# Patient Record
Sex: MALE | Race: WHITE | NOT HISPANIC OR LATINO | Employment: FULL TIME | URBAN - METROPOLITAN AREA
[De-identification: names, ages, dates, MRNs, and addresses within clinical notes are randomized per-mention and may not be internally consistent; named-entity substitution may affect disease eponyms.]

---

## 2017-01-31 ENCOUNTER — HOSPITAL ENCOUNTER (OUTPATIENT)
Dept: RADIOLOGY | Facility: MEDICAL CENTER | Age: 52
End: 2017-01-31
Attending: FAMILY MEDICINE
Payer: COMMERCIAL

## 2017-01-31 DIAGNOSIS — M54.2 CERVICALGIA: ICD-10-CM

## 2017-01-31 PROCEDURE — 72141 MRI NECK SPINE W/O DYE: CPT

## 2017-02-16 ENCOUNTER — TELEPHONE (OUTPATIENT)
Dept: SLEEP MEDICINE | Facility: MEDICAL CENTER | Age: 52
End: 2017-02-16

## 2017-02-16 DIAGNOSIS — G47.33 OSA (OBSTRUCTIVE SLEEP APNEA): ICD-10-CM

## 2017-02-16 NOTE — TELEPHONE ENCOUNTER
Patient walked into the sleep center today - he is very dissatisfied with current DME supplier ([facility]) and would like to switchout to   DME:  [facility] /  [phone] / fax [phone].  PLEASE SIGN ORDER FOR Supplies.    Last f/v 8/16/16  Next 2/21/17

## 2017-02-21 ENCOUNTER — APPOINTMENT (OUTPATIENT)
Dept: SLEEP MEDICINE | Facility: MEDICAL CENTER | Age: 52
End: 2017-02-21
Payer: COMMERCIAL

## 2017-04-23 ENCOUNTER — OFFICE VISIT (OUTPATIENT)
Dept: URGENT CARE | Facility: PHYSICIAN GROUP | Age: 52
End: 2017-04-23
Payer: COMMERCIAL

## 2017-04-23 VITALS
SYSTOLIC BLOOD PRESSURE: 134 MMHG | DIASTOLIC BLOOD PRESSURE: 92 MMHG | BODY MASS INDEX: 40.81 KG/M2 | WEIGHT: 260 LBS | HEIGHT: 67 IN | TEMPERATURE: 98.7 F | RESPIRATION RATE: 18 BRPM | OXYGEN SATURATION: 96 % | HEART RATE: 84 BPM

## 2017-04-23 DIAGNOSIS — L30.9 ECZEMA, UNSPECIFIED TYPE: ICD-10-CM

## 2017-04-23 DIAGNOSIS — B37.89 CANDIDA RASH OF GROIN: ICD-10-CM

## 2017-04-23 PROCEDURE — 99214 OFFICE O/P EST MOD 30 MIN: CPT | Performed by: PHYSICIAN ASSISTANT

## 2017-04-23 RX ORDER — FLUCONAZOLE 150 MG/1
TABLET ORAL
Qty: 3 TAB | Refills: 0 | Status: SHIPPED | OUTPATIENT
Start: 2017-04-23 | End: 2018-10-21

## 2017-04-23 RX ORDER — CLOBETASOL PROPIONATE 0.5 MG/G
CREAM TOPICAL
Qty: 60 G | Refills: 0 | Status: SHIPPED | OUTPATIENT
Start: 2017-04-23 | End: 2017-06-20

## 2017-04-23 NOTE — MR AVS SNAPSHOT
"French Hurd   2017 2:00 PM   Office Visit   MRN: [id number]    Department:  [facility]   Dept Phone:  [phone]    Description:  Male : 1965   Provider:  Lashae Juárez PA-C           Reason for Visit     Rash Groin      Allergies as of 2017     No Known Allergies      You were diagnosed with     Candida rash of groin   [[id number]]         Vital Signs     Blood Pressure Pulse Temperature Respirations Height Weight    134/92 mmHg 84 37.1 °C (98.7 °F) 18 1.702 m (5' 7\") 117.935 kg (260 lb)    Body Mass Index Oxygen Saturation Smoking Status             40.71 kg/m2 96% Former Smoker         Basic Information     Date Of Birth Sex Race Ethnicity Preferred Language    1965 Male White Non- English      Problem List              ICD-10-CM Priority Class Noted - Resolved    Hypertension I10   Unknown - Present    Obesity E66.9   Unknown - Present    Osteoarthritis M19.90   Unknown - Present    Obstructive sleep apnea G47.33   Unknown - Present    Hypercholesteremia E78.00   Unknown - Present    Cervical spine degeneration M47.812   Unknown - Present    Insomnia G47.00   3/16/2010 - Present    Eczema L30.9   2/3/2011 - Present    ED (erectile dysfunction) N52.9   2/3/2011 - Present    Environmental allergies Z91.09   2/3/2011 - Present    GERD (gastroesophageal reflux disease) K21.9   6/15/2011 - Present    Vitamin d deficiency    1/10/2012 - Present    Leg pain, left M79.605   10/10/2013 - Present    Elevated blood sugar R73.9   2014 - Present    Asthma in adult J45.909   2014 - Present    Knee pain M25.569   2014 - Present    Bronchitis with bronchospasm J20.9   2015 - Present    BMI 45.0-49.9, adult (CMS-HCC) Z68.42   2015 - Present      Health Maintenance        Date Due Completion Dates    IMM PNEUMOCOCCAL 19-64 (ADULT) MEDIUM RISK SERIES (1 of 1 - PPSV23) 1986 ---    IMM DTaP/Tdap/Td Vaccine (2 - Td) 2023            Current " Immunizations     Tdap Vaccine 7/7/2013      Below and/or attached are the medications your provider expects you to take. Review all of your home medications and newly ordered medications with your provider and/or pharmacist. Follow medication instructions as directed by your provider and/or pharmacist. Please keep your medication list with you and share with your provider. Update the information when medications are discontinued, doses are changed, or new medications (including over-the-counter products) are added; and carry medication information at all times in the event of emergency situations     Allergies:  No Known Allergies          Medications  Valid as of: April 23, 2017 -  2:46 PM    Generic Name Brand Name Tablet Size Instructions for use    Albuterol Sulfate (Aero Soln) albuterol 108 (90 BASE) MCG/ACT Inhale 2 Puffs by mouth every 6 hours as needed for Shortness of Breath.        Albuterol Sulfate (Aero Soln) albuterol 108 (90 BASE) MCG/ACT Inhale 2 Puffs by mouth every four hours as needed for Shortness of Breath.        Amoxicillin-Pot Clavulanate (Tab) AUGMENTIN 875-125 MG Take 1 Tab by mouth 2 times a day.        Azithromycin (Tab) ZITHROMAX 250 MG Take as directed        Cholecalciferol (Tab) cholecalciferol 1000 UNIT Take 1,000 Units by mouth every day.        Clobetasol Propionate (Cream) TEMOVATE 0.05 % Apply to affected area up BID for up to 10 days.        Clotrimazole (Cream) LOTRIMIN 1 % Apply twice daily to both feet        Esomeprazole Magnesium (CAPSULE DELAYED RELEASE) NEXIUM 40 MG TAKE 1 CAPSULE DAILY        Fenofibrate (Tab) TRICOR 145 MG TAKE 1 TABLET DAILY        Fluconazole (Tab) DIFLUCAN 150 MG Take 1 tablet every other day for 6 days.        Fluticasone Propionate (Suspension) FLONASE 50 MCG/ACT Spray 2 Sprays in nose every day.        Fluticasone Propionate HFA (Aerosol) FLOVENT HFA 44 MCG/ACT Inhale 2 Puffs by mouth 2 times a day.        Guaifenesin-Codeine (Solution) ROBITUSSIN  -10 mg/5mL Take 5-10 mL by mouth every 6 hours as needed for Cough.        Hydrocodone-Acetaminophen (Tab) NORCO  MG Take 1-2 Tabs by mouth every 6 hours as needed.        Ibuprofen (Tab) MOTRIN 600 MG Take 1 Tab by mouth every 6 hours as needed for Mild Pain.        Levocetirizine Dihydrochloride (Tab) Levocetirizine Dihydrochloride 5 MG TAKE 1 TABLET DAILY        Lisinopril (Tab) PRINIVIL 10 MG Take 1 Tab by mouth every day.        Mometasone Furo-Formoterol Fum (Aerosol) Mometasone Furo-Formoterol Fum 200-5 MCG/ACT Inhale 1 Puff by mouth 2 Times a Day.        Mometasone Furo-Formoterol Fum (Aerosol) Mometasone Furo-Formoterol Fum 100-5 MCG/ACT Inhale  by mouth.        Mupirocin (Ointment) BACTROBAN 2 % Apply 1 Application to affected area(s) every day.        Naltrexone-Bupropion HCl (TABLET SR 12 HR) Naltrexone-Bupropion HCl ER 8-90 MG Take 1 Tab by mouth 2 Times a Day.        Nystatin-Triamcinolone (Cream) MYCOLOG 205357-8.1 UNIT/GM-% Apply to affected BID x 10-14 days        Ondansetron (TABLET DISPERSIBLE) ZOFRAN ODT 4 MG Take 1 Tab by mouth every 8 hours as needed for Nausea/Vomiting.        Oxycodone-Acetaminophen (Tab) PERCOCET-10  MG Take 1-2 Tabs by mouth 2 Times a Day.        Triamcinolone Acetonide (Cream) KENALOG 0.1 % Apply in a thin layer twice per day to dermatitis        Zolpidem Tartrate (Tab) AMBIEN 10 MG Take 1 Tab by mouth at bedtime as needed for Sleep.        .                 Medicines prescribed today were sent to:     [facility] - [place], [place] - [address] [place] [address]    [address] [facility] [address]    Phone: [phone] Fax: [phone]    Open 24 Hours?: No    [facility] - [place], [place] - [address] [facility] [address] [place]    [address] [address]    Phone: [phone] Fax: [phone]    Open 24 Hours?: No    [facility] - [place] - [address]    [address]  [facility] [address]    Phone: [phone] Fax: [phone]    Open 24 Hours?: No    [facility]    [address] [facility] [address] [facility] [address] [place] [address]    Phone: [phone] Fax: [phone]      Medication refill instructions:       If your prescription bottle indicates you have medication refills left, it is not necessary to call your provider’s office. Please contact your pharmacy and they will refill your medication.    If your prescription bottle indicates you do not have any refills left, you may request refills at any time through one of the following ways: The online [employer] system (except Urgent Care), by calling your provider’s office, or by asking your pharmacy to contact your provider’s office with a refill request. Medication refills are processed only during regular business hours and may not be available until the next business day. Your provider may request additional information or to have a follow-up visit with you prior to refilling your medication.   *Please Note: Medication refills are assigned a new Rx number when refilled electronically. Your pharmacy may indicate that no refills were authorized even though a new prescription for the same medication is available at the pharmacy. Please request the medicine by name with the pharmacy before contacting your provider for a refill.           [employer] Access Code: Activation code not generated  Current [employer] Status: Active

## 2017-04-30 NOTE — PROGRESS NOTES
Chief Complaint   Patient presents with   • Rash     Groin       HISTORY OF PRESENT ILLNESS: Patient is a 51 y.o. male who presents today for rash in groin, worsening last several days.  Patient has had itchy rash in this area for several days however in last day or so it has caused swelling in the genital region and increased itching.  Patient also notes he has recurrence of his known eczema on his chest, but has had this before and in his groin this feels completley dissimilar.    Denies hx of rash in this area.  Patient is not diabetic.  He has not attempted any interventions.   Feels well otherwise.   Denies fevers, chills, abdominal pain or body aches or urinary symptoms.     He is also requesting refill on his Clobetasol for his eczema as this has worked well for him in the past.     Patient Active Problem List    Diagnosis Date Noted   • BMI 45.0-49.9, adult (CMS-[facility]) 09/22/2015   • Bronchitis with bronchospasm 08/25/2015   • Knee pain 12/23/2014   • Asthma in adult 04/24/2014   • Elevated blood sugar 01/23/2014   • Leg pain, left 10/10/2013   • Vitamin d deficiency 01/10/2012   • GERD (gastroesophageal reflux disease) 06/15/2011   • Eczema 02/03/2011   • ED (erectile dysfunction) 02/03/2011   • Environmental allergies 02/03/2011   • Insomnia 03/16/2010   • Hypertension    • Obesity    • Osteoarthritis    • Obstructive sleep apnea    • Hypercholesteremia    • Cervical spine degeneration        Allergies:Review of patient's allergies indicates no known allergies.    Current Outpatient Prescriptions Ordered in [facility]   Medication Sig Dispense Refill   • nystatin/triamcinolone (MYCOLOG) 307040-0.1 UNIT/GM-% Cream Apply to affected BID x 10-14 days 60 g 0   • fluconazole (DIFLUCAN) 150 MG tablet Take 1 tablet every other day for 6 days. 3 Tab 0   • clobetasol (TEMOVATE) 0.05 % Cream Apply to affected area up BID for up to 10 days. 60 g 0   • clotrimazole (LOTRIMIN) 1 % Cream Apply twice daily to both feet 1 Tube 2    • ondansetron (ZOFRAN ODT) 4 MG TABLET DISPERSIBLE Take 1 Tab by mouth every 8 hours as needed for Nausea/Vomiting. 20 Tab 0   • fluticasone (FLOVENT HFA) 44 MCG/ACT Aerosol Inhale 2 Puffs by mouth 2 times a day. 3 Inhaler 3   • Levocetirizine Dihydrochloride 5 MG TABS TAKE 1 TABLET DAILY 90 Tab 3   • fluticasone (FLONASE) 50 MCG/ACT nasal spray Spray 2 Sprays in nose every day. 3 Bottle 3   • fenofibrate (TRICOR) 145 MG TABS TAKE 1 TABLET DAILY 90 Tab 2   • lisinopril (PRINIVIL) 10 MG TABS Take 1 Tab by mouth every day. (Patient taking differently: Take 20 mg by mouth every day.) 90 Tab 3   • ibuprofen (MOTRIN) 600 MG TABS Take 1 Tab by mouth every 6 hours as needed for Mild Pain. 30 Each 0   • amoxicillin-clavulanate (AUGMENTIN) 875-125 MG Tab Take 1 Tab by mouth 2 times a day. 20 Tab 0   • Mometasone Furo-Formoterol Fum 100-5 MCG/ACT Aerosol Inhale  by mouth.     • azithromycin (ZITHROMAX) 250 MG Tab Take as directed 6 Tab 0   • albuterol (VENTOLIN OR PROVENTIL) 108 (90 BASE) MCG/ACT Aero Soln inhalation aerosol Inhale 2 Puffs by mouth every four hours as needed for Shortness of Breath. 1 Inhaler 1   • guaifenesin-codeine (CHERATUSSIN AC) Solution Take 5-10 mL by mouth every 6 hours as needed for Cough. 90 mL 0   • Naltrexone-Bupropion HCl ER 8-90 MG TABLET SR 12 HR Take 1 Tab by mouth 2 Times a Day. 180 Tab 0   • hydrocodone/acetaminophen (NORCO)  MG Tab Take 1-2 Tabs by mouth every 6 hours as needed. 60 Tab 0   • albuterol (VENTOLIN OR PROVENTIL) 108 (90 BASE) MCG/ACT Aero Soln inhalation aerosol Inhale 2 Puffs by mouth every 6 hours as needed for Shortness of Breath. 3 Inhaler 3   • mupirocin (BACTROBAN) 2 % OINT Apply 1 Application to affected area(s) every day. 1 Tube 0   • zolpidem (AMBIEN) 10 MG TABS Take 1 Tab by mouth at bedtime as needed for Sleep. 90 Each 3   • Oxycodone-Acetaminophen (PERCOCET-10)  MG TABS Take 1-2 Tabs by mouth 2 Times a Day. 60 Tab 0   • NEXIUM 40 MG capsule TAKE 1  CAPSULE DAILY 90 Cap 3   • Mometasone Furo-Formoterol Fum 200-5 MCG/ACT AERO Inhale 1 Puff by mouth 2 Times a Day. 3 Inhaler 3   • vitamin D (CHOLECALCIFEROL) 1000 UNIT TABS Take 1,000 Units by mouth every day.     • triamcinolone acetonide (KENALOG) 0.1 % CREA Apply in a thin layer twice per day to dermatitis 3 Tube 3     No current Epic-ordered facility-administered medications on file.       Past Medical History   Diagnosis Date   • BPH (benign prostatic hypertrophy)    • Obesity    • Sleep apnea      on CPAP   • Hypercholesteremia    • Prediabetes    • Right arm pain    • Breast mass      right side between 11 and 12 o'clock at nipple   • Chest pain    • Allergy    • Osteoarthritis    • Cervical spine degeneration    • ASTHMA    • Hypertension    • ED (erectile dysfunction)    • Back pain    • Sweat, sweating, excessive    • Wears glasses    • Earache    • Ringing in ears    • Difficulty breathing        Social History   Substance Use Topics   • Smoking status: Former Smoker -- 0.50 packs/day for .5 years     Types: Cigarettes     Quit date: 02/11/1982   • Smokeless tobacco: Never Used   • Alcohol Use: Yes      Comment: 3-4       Family Status   Relation Status Death Age   • Mother Other      pt is adopted   • Father Other      pt is adopted   • Daughter Alive    • Daughter Alive    History reviewed. No pertinent family history.    ROS:  Review of Systems   Constitutional: Negative for fever, chills, weight loss and malaise/fatigue.   HENT: Negative for ear pain, nosebleeds, congestion, sore throat and neck pain.    Eyes: Negative for blurred vision.   Respiratory: Negative for cough, sputum production, shortness of breath and wheezing.    Cardiovascular: Negative for chest pain, palpitations, orthopnea and leg swelling.   Gastrointestinal: Negative for heartburn, nausea, vomiting and abdominal pain.   Genitourinary: Negative for dysuria, urgency and frequency.   Skin: SEE HPI  All other systems reviewed and are  "negative.       Exam:  Blood pressure 134/92, pulse 84, temperature 37.1 °C (98.7 °F), resp. rate 18, height 1.702 m (5' 7\"), weight 117.935 kg (260 lb), SpO2 96 %.  General:  Well nourished, well developed male in NAD  Eyes: PERRLA, EOM within normal limits, no conjunctival injection, no scleral icterus, visual fields and acuity grossly intact.  Mouth: reasonable hygiene, no erythema exudates or tonsillar enlargement.  Neck: no masses, range of motion within normal limits, no tracheal deviation. No lymphadenopathy  Pulmonary: Normal respiratory effort, no wheezes, crackles, or rhonchi.  Cardiovascular: regular rate and rhythm without murmurs, rubs, or gallops.  Abdomen: Soft, nontender, nondistended. Normal bowel sounds. No hepatosplenomegaly or masses, or hernias. No rebound or guarding.  Skin: Warm, pink, dry.  Groin, penis with moderate macular erythema.  There is no vesicles, pustules or drainage.  No warmth.   No penile discharge.   Anterior mid right chest there is maculopapular rash, area 3 cm x 2 cm.   No vesicles or pustules.   Extremities: no clubbing, cyanosis, or edema.  Neuro: A&O x 3. Speech normal/clear.  Normal gait.         Assessment/Plan:  1. Candida rash of groin  nystatin/triamcinolone (MYCOLOG) 604616-5.1 UNIT/GM-% Cream    fluconazole (DIFLUCAN) 150 MG tablet    clobetasol (TEMOVATE) 0.05 % Cream   2. Eczema, unspecified type         -rash of groin most consistent with concern for candida.   Will give oral and topical treatment with steroid component for itching.   -given refill of Clobetasol however recommend against that he use in groin at all and to avoid all mucus membranes/genitalia.   -recommend 2-3 day follow up for recheck, PCP follow up to this.   Consider Derm follow up if needed.     Supportive care, differential diagnoses, and indications for immediate follow-up discussed with patient.   Pathogenesis of diagnosis discussed including typical length and natural progression. "   Instructed to return to clinic or nearest emergency department for any change in condition, further concerns, or worsening of symptoms.  Patient states understanding of the plan of care and discharge instructions.  Instructed to make an appointment, for follow up, with their primary care provider.      Lashae Juárez PA-C

## 2017-06-14 ENCOUNTER — HOSPITAL ENCOUNTER (OUTPATIENT)
Dept: LAB | Facility: MEDICAL CENTER | Age: 52
End: 2017-06-14
Attending: FAMILY MEDICINE
Payer: COMMERCIAL

## 2017-06-14 LAB
ALBUMIN SERPL BCP-MCNC: 3.8 G/DL (ref 3.2–4.9)
ALBUMIN/GLOB SERPL: 1.3 G/DL
ALP SERPL-CCNC: 77 U/L (ref 30–99)
ALT SERPL-CCNC: 30 U/L (ref 2–50)
ANION GAP SERPL CALC-SCNC: 7 MMOL/L (ref 0–11.9)
AST SERPL-CCNC: 23 U/L (ref 12–45)
BASOPHILS # BLD AUTO: 0.8 % (ref 0–1.8)
BASOPHILS # BLD: 0.06 K/UL (ref 0–0.12)
BILIRUB SERPL-MCNC: 0.5 MG/DL (ref 0.1–1.5)
BUN SERPL-MCNC: 18 MG/DL (ref 8–22)
CALCIUM SERPL-MCNC: 9.1 MG/DL (ref 8.5–10.5)
CHLORIDE SERPL-SCNC: 105 MMOL/L (ref 96–112)
CHOLEST SERPL-MCNC: 231 MG/DL (ref 100–199)
CO2 SERPL-SCNC: 26 MMOL/L (ref 20–33)
CREAT SERPL-MCNC: 0.82 MG/DL (ref 0.5–1.4)
CREAT UR-MCNC: 177.1 MG/DL
EOSINOPHIL # BLD AUTO: 0.34 K/UL (ref 0–0.51)
EOSINOPHIL NFR BLD: 4.6 % (ref 0–6.9)
ERYTHROCYTE [DISTWIDTH] IN BLOOD BY AUTOMATED COUNT: 42.5 FL (ref 35.9–50)
EST. AVERAGE GLUCOSE BLD GHB EST-MCNC: 120 MG/DL
GFR SERPL CREATININE-BSD FRML MDRD: >60 ML/MIN/1.73 M 2
GLOBULIN SER CALC-MCNC: 3 G/DL (ref 1.9–3.5)
GLUCOSE SERPL-MCNC: 104 MG/DL (ref 65–99)
HBA1C MFR BLD: 5.8 % (ref 0–5.6)
HCT VFR BLD AUTO: 45 % (ref 42–52)
HDLC SERPL-MCNC: 32 MG/DL
HGB BLD-MCNC: 15.4 G/DL (ref 14–18)
IMM GRANULOCYTES # BLD AUTO: 0.03 K/UL (ref 0–0.11)
IMM GRANULOCYTES NFR BLD AUTO: 0.4 % (ref 0–0.9)
LDLC SERPL CALC-MCNC: ABNORMAL MG/DL
LYMPHOCYTES # BLD AUTO: 2.47 K/UL (ref 1–4.8)
LYMPHOCYTES NFR BLD: 33.1 % (ref 22–41)
MCH RBC QN AUTO: 30.4 PG (ref 27–33)
MCHC RBC AUTO-ENTMCNC: 34.2 G/DL (ref 33.7–35.3)
MCV RBC AUTO: 88.9 FL (ref 81.4–97.8)
MICROALBUMIN UR-MCNC: <0.7 MG/DL
MICROALBUMIN/CREAT UR: NORMAL MG/G (ref 0–30)
MONOCYTES # BLD AUTO: 0.76 K/UL (ref 0–0.85)
MONOCYTES NFR BLD AUTO: 10.2 % (ref 0–13.4)
NEUTROPHILS # BLD AUTO: 3.81 K/UL (ref 1.82–7.42)
NEUTROPHILS NFR BLD: 50.9 % (ref 44–72)
NRBC # BLD AUTO: 0 K/UL
NRBC BLD AUTO-RTO: 0 /100 WBC
PLATELET # BLD AUTO: 251 K/UL (ref 164–446)
PMV BLD AUTO: 11.4 FL (ref 9–12.9)
POTASSIUM SERPL-SCNC: 4.4 MMOL/L (ref 3.6–5.5)
PROT SERPL-MCNC: 6.8 G/DL (ref 6–8.2)
RBC # BLD AUTO: 5.06 M/UL (ref 4.7–6.1)
SODIUM SERPL-SCNC: 138 MMOL/L (ref 135–145)
TRIGL SERPL-MCNC: 474 MG/DL (ref 0–149)
TSH SERPL DL<=0.005 MIU/L-ACNC: 3.93 UIU/ML (ref 0.3–3.7)
WBC # BLD AUTO: 7.5 K/UL (ref 4.8–10.8)

## 2017-06-14 PROCEDURE — 85025 COMPLETE CBC W/AUTO DIFF WBC: CPT

## 2017-06-14 PROCEDURE — 84443 ASSAY THYROID STIM HORMONE: CPT

## 2017-06-14 PROCEDURE — 36415 COLL VENOUS BLD VENIPUNCTURE: CPT

## 2017-06-14 PROCEDURE — 80061 LIPID PANEL: CPT

## 2017-06-14 PROCEDURE — 82043 UR ALBUMIN QUANTITATIVE: CPT

## 2017-06-14 PROCEDURE — 80053 COMPREHEN METABOLIC PANEL: CPT

## 2017-06-14 PROCEDURE — 83036 HEMOGLOBIN GLYCOSYLATED A1C: CPT

## 2017-06-14 PROCEDURE — 82570 ASSAY OF URINE CREATININE: CPT

## 2018-02-18 ENCOUNTER — OFFICE VISIT (OUTPATIENT)
Dept: URGENT CARE | Facility: PHYSICIAN GROUP | Age: 53
End: 2018-02-18
Payer: COMMERCIAL

## 2018-02-18 VITALS
BODY MASS INDEX: 46.93 KG/M2 | OXYGEN SATURATION: 97 % | HEART RATE: 104 BPM | SYSTOLIC BLOOD PRESSURE: 150 MMHG | WEIGHT: 299 LBS | HEIGHT: 67 IN | RESPIRATION RATE: 24 BRPM | TEMPERATURE: 98.7 F | DIASTOLIC BLOOD PRESSURE: 78 MMHG

## 2018-02-18 DIAGNOSIS — R06.02 SOB (SHORTNESS OF BREATH): ICD-10-CM

## 2018-02-18 DIAGNOSIS — B35.9 TINEA: ICD-10-CM

## 2018-02-18 DIAGNOSIS — R06.2 WHEEZING: ICD-10-CM

## 2018-02-18 DIAGNOSIS — J45.909 UNCOMPLICATED ASTHMA, UNSPECIFIED ASTHMA SEVERITY, UNSPECIFIED WHETHER PERSISTENT: ICD-10-CM

## 2018-02-18 PROCEDURE — 99214 OFFICE O/P EST MOD 30 MIN: CPT | Performed by: PHYSICIAN ASSISTANT

## 2018-02-18 RX ORDER — CLOTRIMAZOLE 1 %
CREAM (GRAM) TOPICAL
Qty: 1 TUBE | Refills: 0 | Status: SHIPPED | OUTPATIENT
Start: 2018-02-18 | End: 2018-10-21

## 2018-02-18 RX ORDER — ALBUTEROL SULFATE 2.5 MG/3ML
2.5 SOLUTION RESPIRATORY (INHALATION) EVERY 4 HOURS PRN
Qty: 60 BULLET | Refills: 2 | Status: SHIPPED | OUTPATIENT
Start: 2018-02-18 | End: 2019-09-17

## 2018-02-18 RX ORDER — PREDNISONE 10 MG/1
TABLET ORAL
Qty: 30 TAB | Refills: 0 | Status: SHIPPED | OUTPATIENT
Start: 2018-02-18 | End: 2018-10-21

## 2018-02-18 RX ORDER — DOXYCYCLINE HYCLATE 100 MG
100 TABLET ORAL 2 TIMES DAILY
Qty: 20 TAB | Refills: 0 | Status: SHIPPED | OUTPATIENT
Start: 2018-02-18 | End: 2018-10-21

## 2018-02-18 NOTE — PROGRESS NOTES
"Chief Complaint   Patient presents with   • Cough     Productive; x2 days   • Rash     Back       HISTORY OF PRESENT ILLNESS: Patient is a 52 y.o. male who presents today for the following:      Cough x 2 days  + dry, colder than normal, \"humming\" in left ear  H/o asthma  No nebulizer  Denies  Fever, nasal congestion, ST    Rash -   Left groin, red, well demarcated borders  Back - itching x 2 weeks       Patient Active Problem List    Diagnosis Date Noted   • BMI 45.0-49.9, adult (CMS-[facility]) 09/22/2015   • Bronchitis with bronchospasm 08/25/2015   • Knee pain 12/23/2014   • Asthma in adult 04/24/2014   • Elevated blood sugar 01/23/2014   • Leg pain, left 10/10/2013   • Vitamin d deficiency 01/10/2012   • GERD (gastroesophageal reflux disease) 06/15/2011   • Eczema 02/03/2011   • ED (erectile dysfunction) 02/03/2011   • Environmental allergies 02/03/2011   • Insomnia 03/16/2010   • Hypertension    • Obesity    • Osteoarthritis    • Obstructive sleep apnea    • Hypercholesteremia    • Cervical spine degeneration        Allergies:Patient has no known allergies.    Current Outpatient Prescriptions Ordered in [facility]   Medication Sig Dispense Refill   • predniSONE (DELTASONE) 10 MG Tab 50mg x 1 day; 40mg x 1 day; 30mg x 1 day; 20mg x 1 day; 10mg x 1 day 30 Tab 0   • doxycycline (VIBRAMYCIN) 100 MG Tab Take 1 Tab by mouth 2 times a day. Fill if symptoms worsen at any point OR if they fail to improve over the next 7-10 days 20 Tab 0   • clotrimazole (LOTRIMIN) 1 % Cream Apply to affected area 2-3 times per day 1 Tube 0   • albuterol (PROVENTIL) 2.5mg/3ml Nebu Soln solution for nebulization 3 mL by Nebulization route every four hours as needed for Shortness of Breath. 60 Bullet 2   • nystatin/triamcinolone (MYCOLOG) 358988-0.1 UNIT/GM-% Cream Apply to affected BID x 10-14 days 60 g 0   • fluconazole (DIFLUCAN) 150 MG tablet Take 1 tablet every other day for 6 days. 3 Tab 0   • amoxicillin-clavulanate (AUGMENTIN) 875-125 MG Tab " Take 1 Tab by mouth 2 times a day. 20 Tab 0   • clotrimazole (LOTRIMIN) 1 % Cream Apply twice daily to both feet 1 Tube 2   • ondansetron (ZOFRAN ODT) 4 MG TABLET DISPERSIBLE Take 1 Tab by mouth every 8 hours as needed for Nausea/Vomiting. 20 Tab 0   • Mometasone Furo-Formoterol Fum 100-5 MCG/ACT Aerosol Inhale  by mouth.     • azithromycin (ZITHROMAX) 250 MG Tab Take as directed 6 Tab 0   • albuterol (VENTOLIN OR PROVENTIL) 108 (90 BASE) MCG/ACT Aero Soln inhalation aerosol Inhale 2 Puffs by mouth every four hours as needed for Shortness of Breath. 1 Inhaler 1   • guaifenesin-codeine (CHERATUSSIN AC) Solution Take 5-10 mL by mouth every 6 hours as needed for Cough. 90 mL 0   • Naltrexone-Bupropion HCl ER 8-90 MG TABLET SR 12 HR Take 1 Tab by mouth 2 Times a Day. 180 Tab 0   • hydrocodone/acetaminophen (NORCO)  MG Tab Take 1-2 Tabs by mouth every 6 hours as needed. 60 Tab 0   • fluticasone (FLOVENT HFA) 44 MCG/ACT Aerosol Inhale 2 Puffs by mouth 2 times a day. 3 Inhaler 3   • albuterol (VENTOLIN OR PROVENTIL) 108 (90 BASE) MCG/ACT Aero Soln inhalation aerosol Inhale 2 Puffs by mouth every 6 hours as needed for Shortness of Breath. 3 Inhaler 3   • mupirocin (BACTROBAN) 2 % OINT Apply 1 Application to affected area(s) every day. 1 Tube 0   • Levocetirizine Dihydrochloride 5 MG TABS TAKE 1 TABLET DAILY 90 Tab 3   • zolpidem (AMBIEN) 10 MG TABS Take 1 Tab by mouth at bedtime as needed for Sleep. 90 Each 3   • Oxycodone-Acetaminophen (PERCOCET-10)  MG TABS Take 1-2 Tabs by mouth 2 Times a Day. 60 Tab 0   • fluticasone (FLONASE) 50 MCG/ACT nasal spray Spray 2 Sprays in nose every day. 3 Bottle 3   • fenofibrate (TRICOR) 145 MG TABS TAKE 1 TABLET DAILY 90 Tab 2   • NEXIUM 40 MG capsule TAKE 1 CAPSULE DAILY 90 Cap 3   • Mometasone Furo-Formoterol Fum 200-5 MCG/ACT AERO Inhale 1 Puff by mouth 2 Times a Day. 3 Inhaler 3   • lisinopril (PRINIVIL) 10 MG TABS Take 1 Tab by mouth every day. (Patient taking  differently: Take 20 mg by mouth every day.) 90 Tab 3   • vitamin D (CHOLECALCIFEROL) 1000 UNIT TABS Take 1,000 Units by mouth every day.     • triamcinolone acetonide (KENALOG) 0.1 % CREA Apply in a thin layer twice per day to dermatitis 3 Tube 3   • ibuprofen (MOTRIN) 600 MG TABS Take 1 Tab by mouth every 6 hours as needed for Mild Pain. 30 Each 0     No current [facility]-ordered facility-administered medications on file.        Past Medical History:   Diagnosis Date   • Allergy    • ASTHMA    • Back pain    • BPH (benign prostatic hypertrophy)    • Breast mass     right side between 11 and 12 o'clock at nipple   • Cervical spine degeneration    • Chest pain    • Difficulty breathing    • Earache    • ED (erectile dysfunction)    • Hypercholesteremia    • Hypertension    • Obesity    • Osteoarthritis    • Prediabetes    • Right arm pain    • Ringing in ears    • Sleep apnea     on CPAP   • Sweat, sweating, excessive    • Wears glasses        Social History   Substance Use Topics   • Smoking status: Former Smoker     Packs/day: 0.50     Years: 0.50     Types: Cigarettes     Quit date: 2/11/1982   • Smokeless tobacco: Never Used   • Alcohol use Yes      Comment: 3-4       Family Status   Relation Status   • Mother Other    pt is adopted   • Father Other    pt is adopted   • Daughter Alive   • Daughter Alive   History reviewed. No pertinent family history.    ROS:   Review of Systems   Constitutional: Negative for fever, chills, weight loss and malaise/fatigue.   HENT: Negative for ear pain, nosebleeds, congestion, sore throat and neck pain.    Eyes: Negative for blurred vision.   Respiratory: Positive for cough, shortness of breath and wheezing.    Cardiovascular: Negative for chest pain, palpitations, orthopnea and leg swelling.   Gastrointestinal: Negative for heartburn, nausea, vomiting and abdominal pain.   Genitourinary: Negative for dysuria, urgency and frequency.       Exam:  Blood pressure 150/78, pulse (!) 104,  "temperature 37.1 °C (98.7 °F), resp. rate (!) 24, height 1.702 m (5' 7\"), weight (!) 135.6 kg (299 lb), SpO2 97 %.  General: Well developed, well nourished. No distress.  HEENT: Conjunctiva clear, lids without ptosis, PERRL/EOMI. Ears normal shape and contour, canals are clear bilaterally, tympanic membranes are benign. Nasal mucosa benign. Oropharynx is without erythema, edema or exudates. Is grossly normal.  Pulmonary: Diffuse mild inspiratory and inspiratory wheezes.  Cardiovascular: Regular rate and rhythm without murmur. No edema.   Neurologic: Grossly nonfocal.  Lymph: No cervical lymphadenopathy noted.  Skin: Deferred.  Psych: Normal mood. Alert and oriented x3. Judgment and insight is normal.    Assessment/Plan:  Discussed likely viral etiology. Discussed appropriate over-the-counter symptomatic medication, and when to return to clinic. Start steroids now. Antibiotics for worsening respiratory symptoms. Use all medication as directed. Follow up for worsening or persistent symptoms. Patient will be sent home with a nebulizer machine.   1. Uncomplicated asthma, unspecified asthma severity, unspecified whether persistent  doxycycline (VIBRAMYCIN) 100 MG Tab   2. SOB (shortness of breath)  predniSONE (DELTASONE) 10 MG Tab    albuterol (PROVENTIL) 2.5mg/3ml Nebu Soln solution for nebulization   3. Wheezing  predniSONE (DELTASONE) 10 MG Tab    albuterol (PROVENTIL) 2.5mg/3ml Nebu Soln solution for nebulization   4. Tinea  predniSONE (DELTASONE) 10 MG Tab    clotrimazole (LOTRIMIN) 1 % Cream         "

## 2018-04-09 ENCOUNTER — OFFICE VISIT (OUTPATIENT)
Dept: URGENT CARE | Facility: PHYSICIAN GROUP | Age: 53
End: 2018-04-09
Payer: COMMERCIAL

## 2018-04-09 VITALS
HEART RATE: 83 BPM | HEIGHT: 67 IN | OXYGEN SATURATION: 93 % | TEMPERATURE: 98.1 F | SYSTOLIC BLOOD PRESSURE: 140 MMHG | BODY MASS INDEX: 48.03 KG/M2 | RESPIRATION RATE: 16 BRPM | DIASTOLIC BLOOD PRESSURE: 82 MMHG | WEIGHT: 306 LBS

## 2018-04-09 DIAGNOSIS — J01.40 ACUTE PANSINUSITIS, RECURRENCE NOT SPECIFIED: ICD-10-CM

## 2018-04-09 PROCEDURE — 99214 OFFICE O/P EST MOD 30 MIN: CPT | Performed by: NURSE PRACTITIONER

## 2018-04-09 RX ORDER — AMOXICILLIN AND CLAVULANATE POTASSIUM 875; 125 MG/1; MG/1
1 TABLET, FILM COATED ORAL 2 TIMES DAILY
Qty: 14 TAB | Refills: 0 | Status: SHIPPED | OUTPATIENT
Start: 2018-04-09 | End: 2018-04-16

## 2018-04-09 ASSESSMENT — ENCOUNTER SYMPTOMS
ORTHOPNEA: 0
HEADACHES: 0
BACK PAIN: 0
CHILLS: 0
CONSTIPATION: 0
PALPITATIONS: 0
SPUTUM PRODUCTION: 1
WHEEZING: 0
SINUS PAIN: 1
FEVER: 0
DIARRHEA: 0
EYE REDNESS: 0
NAUSEA: 0
ABDOMINAL PAIN: 0
SORE THROAT: 1
VOMITING: 0
WEAKNESS: 0
SHORTNESS OF BREATH: 0
DIZZINESS: 0
COUGH: 1
EYE DISCHARGE: 0
MYALGIAS: 0

## 2018-04-09 ASSESSMENT — PAIN SCALES - GENERAL: PAINLEVEL: NO PAIN

## 2018-04-09 NOTE — PROGRESS NOTES
Subjective:      French Hurd is a 52 y.o. male who presents with Hoarse (x 1 day); Pharyngitis; and Nasal Congestion            HPI  French is here for loss of voice x 1 day, but has had nasal congestion, green nasal d/c, PND, sore throat, cough x 1 week. Taking no OTC meds, works for [employer] and just got  Back in town. H/o asthma and used inhaler yesterday but has not had problems with asthma. Has flonase but not using.    PMH:  has a past medical history of Allergy; ASTHMA; Back pain; BPH (benign prostatic hypertrophy); Breast mass; Cervical spine degeneration; Chest pain; Difficulty breathing; Earache; ED (erectile dysfunction); Hypercholesteremia; Hypertension; Obesity; Osteoarthritis; Prediabetes; Right arm pain; Ringing in ears; Sleep apnea; Sweat, sweating, excessive; and Wears glasses.  MEDS:   Current Outpatient Prescriptions:   •  amoxicillin-clavulanate (AUGMENTIN) 875-125 MG Tab, Take 1 Tab by mouth 2 times a day for 7 days., Disp: 14 Tab, Rfl: 0  •  clotrimazole (LOTRIMIN) 1 % Cream, Apply twice daily to both feet, Disp: 1 Tube, Rfl: 2  •  Levocetirizine Dihydrochloride 5 MG TABS, TAKE 1 TABLET DAILY, Disp: 90 Tab, Rfl: 3  •  fluticasone (FLONASE) 50 MCG/ACT nasal spray, Spray 2 Sprays in nose every day., Disp: 3 Bottle, Rfl: 3  •  fenofibrate (TRICOR) 145 MG TABS, TAKE 1 TABLET DAILY, Disp: 90 Tab, Rfl: 2  •  NEXIUM 40 MG capsule, TAKE 1 CAPSULE DAILY, Disp: 90 Cap, Rfl: 3  •  lisinopril (PRINIVIL) 10 MG TABS, Take 1 Tab by mouth every day. (Patient taking differently: Take 20 mg by mouth every day.), Disp: 90 Tab, Rfl: 3  •  predniSONE (DELTASONE) 10 MG Tab, 50mg x 1 day; 40mg x 1 day; 30mg x 1 day; 20mg x 1 day; 10mg x 1 day, Disp: 30 Tab, Rfl: 0  •  doxycycline (VIBRAMYCIN) 100 MG Tab, Take 1 Tab by mouth 2 times a day. Fill if symptoms worsen at any point OR if they fail to improve over the next 7-10 days, Disp: 20 Tab, Rfl: 0  •  clotrimazole (LOTRIMIN) 1 % Cream, Apply to affected area 2-3 times  per day, Disp: 1 Tube, Rfl: 0  •  albuterol (PROVENTIL) 2.5mg/3ml Nebu Soln solution for nebulization, 3 mL by Nebulization route every four hours as needed for Shortness of Breath., Disp: 60 Bullet, Rfl: 2  •  nystatin/triamcinolone (MYCOLOG) 987091-1.1 UNIT/GM-% Cream, Apply to affected BID x 10-14 days, Disp: 60 g, Rfl: 0  •  fluconazole (DIFLUCAN) 150 MG tablet, Take 1 tablet every other day for 6 days., Disp: 3 Tab, Rfl: 0  •  amoxicillin-clavulanate (AUGMENTIN) 875-125 MG Tab, Take 1 Tab by mouth 2 times a day., Disp: 20 Tab, Rfl: 0  •  ondansetron (ZOFRAN ODT) 4 MG TABLET DISPERSIBLE, Take 1 Tab by mouth every 8 hours as needed for Nausea/Vomiting., Disp: 20 Tab, Rfl: 0  •  Mometasone Furo-Formoterol Fum 100-5 MCG/ACT Aerosol, Inhale  by mouth., Disp: , Rfl:   •  azithromycin (ZITHROMAX) 250 MG Tab, Take as directed, Disp: 6 Tab, Rfl: 0  •  albuterol (VENTOLIN OR PROVENTIL) 108 (90 BASE) MCG/ACT Aero Soln inhalation aerosol, Inhale 2 Puffs by mouth every four hours as needed for Shortness of Breath., Disp: 1 Inhaler, Rfl: 1  •  guaifenesin-codeine (CHERATUSSIN AC) Solution, Take 5-10 mL by mouth every 6 hours as needed for Cough., Disp: 90 mL, Rfl: 0  •  Naltrexone-Bupropion HCl ER 8-90 MG TABLET SR 12 HR, Take 1 Tab by mouth 2 Times a Day., Disp: 180 Tab, Rfl: 0  •  hydrocodone/acetaminophen (NORCO)  MG Tab, Take 1-2 Tabs by mouth every 6 hours as needed., Disp: 60 Tab, Rfl: 0  •  fluticasone (FLOVENT HFA) 44 MCG/ACT Aerosol, Inhale 2 Puffs by mouth 2 times a day., Disp: 3 Inhaler, Rfl: 3  •  albuterol (VENTOLIN OR PROVENTIL) 108 (90 BASE) MCG/ACT Aero Soln inhalation aerosol, Inhale 2 Puffs by mouth every 6 hours as needed for Shortness of Breath., Disp: 3 Inhaler, Rfl: 3  •  mupirocin (BACTROBAN) 2 % OINT, Apply 1 Application to affected area(s) every day., Disp: 1 Tube, Rfl: 0  •  zolpidem (AMBIEN) 10 MG TABS, Take 1 Tab by mouth at bedtime as needed for Sleep., Disp: 90 Each, Rfl: 3  •   Oxycodone-Acetaminophen (PERCOCET-10)  MG TABS, Take 1-2 Tabs by mouth 2 Times a Day., Disp: 60 Tab, Rfl: 0  •  Mometasone Furo-Formoterol Fum 200-5 MCG/ACT AERO, Inhale 1 Puff by mouth 2 Times a Day., Disp: 3 Inhaler, Rfl: 3  •  vitamin D (CHOLECALCIFEROL) 1000 UNIT TABS, Take 1,000 Units by mouth every day., Disp: , Rfl:   •  triamcinolone acetonide (KENALOG) 0.1 % CREA, Apply in a thin layer twice per day to dermatitis, Disp: 3 Tube, Rfl: 3  •  ibuprofen (MOTRIN) 600 MG TABS, Take 1 Tab by mouth every 6 hours as needed for Mild Pain., Disp: 30 Each, Rfl: 0  ALLERGIES: No Known Allergies  SURGHX:   Past Surgical History:   Procedure Laterality Date   • APPENDECTOMY     • HAND ORIF     • OTHER ORTHOPEDIC SURGERY      L wrist, R hand-4th and 5th metacarpal   • TONSILLECTOMY     • VASECTOMY      x2     SOCHX:  reports that he quit smoking about 36 years ago. His smoking use included Cigarettes. He has a 0.25 pack-year smoking history. He has never used smokeless tobacco. He reports that he drinks alcohol. He reports that he does not use drugs.  FH: Family history was reviewed, no pertinent findings to report    Review of Systems   Constitutional: Positive for malaise/fatigue. Negative for chills and fever.   HENT: Positive for congestion, sinus pain and sore throat. Negative for ear pain.    Eyes: Negative for discharge and redness.   Respiratory: Positive for cough and sputum production. Negative for shortness of breath and wheezing.    Cardiovascular: Negative for chest pain, palpitations and orthopnea.   Gastrointestinal: Negative for abdominal pain, constipation, diarrhea, nausea and vomiting.   Musculoskeletal: Negative for back pain and myalgias.   Neurological: Negative for dizziness, weakness and headaches.   Endo/Heme/Allergies: Negative for environmental allergies.   All other systems reviewed and are negative.         Objective:     /82   Pulse 83   Temp 36.7 °C (98.1 °F)   Resp 16   Ht  "1.702 m (5' 7\")   Wt (!) 138.8 kg (306 lb)   SpO2 93%   BMI 47.93 kg/m²      Physical Exam   Constitutional: He is oriented to person, place, and time. Vital signs are normal. He appears well-developed and well-nourished. He is active and cooperative.  Non-toxic appearance. He does not have a sickly appearance. He appears ill. No distress.   HENT:   Head: Normocephalic.   Right Ear: External ear and ear canal normal. Tympanic membrane is injected.   Left Ear: External ear and ear canal normal. Tympanic membrane is injected.   Nose: Mucosal edema, rhinorrhea and sinus tenderness present. Right sinus exhibits maxillary sinus tenderness and frontal sinus tenderness. Left sinus exhibits maxillary sinus tenderness.   Mouth/Throat: Uvula is midline. Mucous membranes are dry. No uvula swelling. Posterior oropharyngeal edema and posterior oropharyngeal erythema present.   Eyes: Conjunctivae and EOM are normal. Pupils are equal, round, and reactive to light.   Neck: Normal range of motion. Neck supple.   Cardiovascular: Normal rate and regular rhythm.    Pulmonary/Chest: Effort normal and breath sounds normal. No accessory muscle usage. No respiratory distress. He has no decreased breath sounds. He has no wheezes. He has no rhonchi. He has no rales.   Musculoskeletal: Normal range of motion.   Lymphadenopathy:     He has no cervical adenopathy.   Neurological: He is alert and oriented to person, place, and time.   Skin: Skin is warm and dry. He is not diaphoretic.   Vitals reviewed.              Assessment/Plan:     1. Acute pansinusitis, recurrence not specified    - amoxicillin-clavulanate (AUGMENTIN) 875-125 MG Tab; Take 1 Tab by mouth 2 times a day for 7 days.  Dispense: 14 Tab; Refill: 0    Increase water intake  Get rest  May use Ibuprofen/Tylenol prn for any fever, body aches or throat pain  May take longer acting antihistamine for seasonal allergy symptoms prn  May use saline nasal spray for nasal congestion  May " use Flonase or Nasocort for allergen nasal congestion  May gargle with salt water prn for throat discomfort  May drink smoothies for nutrition if too painful to swallow solid foods  Monitor for productive cough, SOB and chest pain/tightness- need re-evaluation

## 2018-06-06 ENCOUNTER — APPOINTMENT (RX ONLY)
Dept: URBAN - NONMETROPOLITAN AREA CLINIC 15 | Facility: CLINIC | Age: 53
Setting detail: DERMATOLOGY
End: 2018-06-06

## 2018-06-06 DIAGNOSIS — B35.6 TINEA CRURIS: ICD-10-CM

## 2018-06-06 DIAGNOSIS — L82.1 OTHER SEBORRHEIC KERATOSIS: ICD-10-CM

## 2018-06-06 DIAGNOSIS — L91.8 OTHER HYPERTROPHIC DISORDERS OF THE SKIN: ICD-10-CM

## 2018-06-06 DIAGNOSIS — L24.9 IRRITANT CONTACT DERMATITIS, UNSPECIFIED CAUSE: ICD-10-CM

## 2018-06-06 DIAGNOSIS — D485 NEOPLASM OF UNCERTAIN BEHAVIOR OF SKIN: ICD-10-CM

## 2018-06-06 DIAGNOSIS — L30.4 ERYTHEMA INTERTRIGO: ICD-10-CM

## 2018-06-06 PROBLEM — D48.5 NEOPLASM OF UNCERTAIN BEHAVIOR OF SKIN: Status: ACTIVE | Noted: 2018-06-06

## 2018-06-06 PROBLEM — I10 ESSENTIAL (PRIMARY) HYPERTENSION: Status: ACTIVE | Noted: 2018-06-06

## 2018-06-06 PROBLEM — J45.909 UNSPECIFIED ASTHMA, UNCOMPLICATED: Status: ACTIVE | Noted: 2018-06-06

## 2018-06-06 PROCEDURE — ? COUNSELING

## 2018-06-06 PROCEDURE — ? PRESCRIPTION

## 2018-06-06 PROCEDURE — ? PATIENT SPECIFIC COUNSELING

## 2018-06-06 PROCEDURE — 99202 OFFICE O/P NEW SF 15 MIN: CPT

## 2018-06-06 RX ORDER — KETOCONAZOLE 20 MG/G
CREAM TOPICAL
Qty: 1 | Refills: 12 | Status: ERX | COMMUNITY
Start: 2018-06-06

## 2018-06-06 RX ADMIN — KETOCONAZOLE: 20 CREAM TOPICAL at 00:00

## 2018-06-06 ASSESSMENT — LOCATION ZONE DERM
LOCATION ZONE: LEG
LOCATION ZONE: HAND
LOCATION ZONE: AXILLAE
LOCATION ZONE: FEET
LOCATION ZONE: FINGER

## 2018-06-06 ASSESSMENT — LOCATION SIMPLE DESCRIPTION DERM
LOCATION SIMPLE: LEFT FOOT
LOCATION SIMPLE: RIGHT AXILLARY VAULT
LOCATION SIMPLE: RIGHT HAND
LOCATION SIMPLE: LEFT AXILLARY VAULT
LOCATION SIMPLE: LEFT THIGH
LOCATION SIMPLE: LEFT HAND
LOCATION SIMPLE: LEFT INDEX FINGER
LOCATION SIMPLE: RIGHT THIGH

## 2018-06-06 ASSESSMENT — LOCATION DETAILED DESCRIPTION DERM
LOCATION DETAILED: LEFT RADIAL DORSAL HAND
LOCATION DETAILED: RIGHT ULNAR DORSAL HAND
LOCATION DETAILED: LEFT DORSAL FOOT
LOCATION DETAILED: LEFT AXILLARY VAULT
LOCATION DETAILED: RIGHT ANTERIOR PROXIMAL THIGH
LOCATION DETAILED: LEFT INDEX PROXIMAL INTERPHALANGEAL JOINT
LOCATION DETAILED: RIGHT AXILLARY VAULT
LOCATION DETAILED: LEFT ANTERIOR PROXIMAL THIGH

## 2018-06-06 NOTE — PROCEDURE: COUNSELING
Detail Level: Zone
Patient Specific Counseling (Will Not Stick From Patient To Patient): Discussed removal of lesions.  Pt. will contact us when he is ready for removal.
Detail Level: Detailed
Patient Specific Counseling (Will Not Stick From Patient To Patient): Discussed that this lesion could be a wart or another type of neoplasm that appears on fingers.  Discussed OTC treatment and if lesion does not respond a bx. is needed.

## 2018-06-06 NOTE — PROCEDURE: PATIENT SPECIFIC COUNSELING
Discussed that I think that the irritation is due to the way that he walks and how his shoes wear on the top of his foot.  Discussed avoidance and OTC treatment.  Pt. is to let me know if it does not get better.
Detail Level: Detailed

## 2018-06-29 ENCOUNTER — OFFICE VISIT (OUTPATIENT)
Dept: URGENT CARE | Facility: PHYSICIAN GROUP | Age: 53
End: 2018-06-29
Payer: COMMERCIAL

## 2018-06-29 ENCOUNTER — APPOINTMENT (OUTPATIENT)
Dept: RADIOLOGY | Facility: IMAGING CENTER | Age: 53
End: 2018-06-29
Attending: PHYSICIAN ASSISTANT
Payer: COMMERCIAL

## 2018-06-29 VITALS
DIASTOLIC BLOOD PRESSURE: 88 MMHG | RESPIRATION RATE: 18 BRPM | HEIGHT: 67 IN | HEART RATE: 96 BPM | SYSTOLIC BLOOD PRESSURE: 136 MMHG | WEIGHT: 314 LBS | OXYGEN SATURATION: 93 % | BODY MASS INDEX: 49.28 KG/M2 | TEMPERATURE: 98.6 F

## 2018-06-29 DIAGNOSIS — M25.561 ACUTE PAIN OF RIGHT KNEE: ICD-10-CM

## 2018-06-29 DIAGNOSIS — M77.8 LEFT ELBOW TENDONITIS: ICD-10-CM

## 2018-06-29 DIAGNOSIS — M25.561 ACUTE PAIN OF RIGHT KNEE: Primary | ICD-10-CM

## 2018-06-29 PROCEDURE — 99214 OFFICE O/P EST MOD 30 MIN: CPT | Performed by: PHYSICIAN ASSISTANT

## 2018-06-29 PROCEDURE — 73564 X-RAY EXAM KNEE 4 OR MORE: CPT | Mod: TC,FY,RT | Performed by: PHYSICIAN ASSISTANT

## 2018-06-29 RX ORDER — LISINOPRIL 20 MG/1
20 TABLET ORAL DAILY
COMMUNITY
End: 2018-10-21

## 2018-06-29 RX ORDER — PREDNISONE 20 MG/1
20 TABLET ORAL DAILY
Qty: 10 TAB | Refills: 0 | Status: SHIPPED | OUTPATIENT
Start: 2018-06-29 | End: 2018-07-09

## 2018-06-29 ASSESSMENT — ENCOUNTER SYMPTOMS
MYALGIAS: 0
ARTHRALGIAS: 1
FOCAL WEAKNESS: 0
TINGLING: 0
SENSORY CHANGE: 0

## 2018-06-29 NOTE — PROGRESS NOTES
Subjective:      French Hurd is a 52 y.o. male who presents with Arm Pain (L arm pain x1wk non injury related) and Knee Pain (R knee pain radiating down to the ankle x1wk non injury related)    PMH:  has a past medical history of Allergy; ASTHMA; Back pain; BPH (benign prostatic hypertrophy); Breast mass; Cervical spine degeneration; Chest pain; Difficulty breathing; Earache; ED (erectile dysfunction); Hypercholesteremia; Hypertension; Obesity; Osteoarthritis; Prediabetes; Right arm pain; Ringing in ears; Sleep apnea; Sweat, sweating, excessive; and Wears glasses.  MEDS:   Current Outpatient Prescriptions:   •  lisinopril (PRINIVIL) 20 MG Tab, Take 20 mg by mouth every day., Disp: , Rfl:   •  predniSONE (DELTASONE) 10 MG Tab, 50mg x 1 day; 40mg x 1 day; 30mg x 1 day; 20mg x 1 day; 10mg x 1 day, Disp: 30 Tab, Rfl: 0  •  doxycycline (VIBRAMYCIN) 100 MG Tab, Take 1 Tab by mouth 2 times a day. Fill if symptoms worsen at any point OR if they fail to improve over the next 7-10 days, Disp: 20 Tab, Rfl: 0  •  clotrimazole (LOTRIMIN) 1 % Cream, Apply to affected area 2-3 times per day, Disp: 1 Tube, Rfl: 0  •  albuterol (PROVENTIL) 2.5mg/3ml Nebu Soln solution for nebulization, 3 mL by Nebulization route every four hours as needed for Shortness of Breath., Disp: 60 Bullet, Rfl: 2  •  nystatin/triamcinolone (MYCOLOG) 379290-9.1 UNIT/GM-% Cream, Apply to affected BID x 10-14 days, Disp: 60 g, Rfl: 0  •  fluconazole (DIFLUCAN) 150 MG tablet, Take 1 tablet every other day for 6 days., Disp: 3 Tab, Rfl: 0  •  amoxicillin-clavulanate (AUGMENTIN) 875-125 MG Tab, Take 1 Tab by mouth 2 times a day., Disp: 20 Tab, Rfl: 0  •  clotrimazole (LOTRIMIN) 1 % Cream, Apply twice daily to both feet, Disp: 1 Tube, Rfl: 2  •  ondansetron (ZOFRAN ODT) 4 MG TABLET DISPERSIBLE, Take 1 Tab by mouth every 8 hours as needed for Nausea/Vomiting., Disp: 20 Tab, Rfl: 0  •  Mometasone Furo-Formoterol Fum 100-5 MCG/ACT Aerosol, Inhale  by mouth., Disp: ,  Rfl:   •  azithromycin (ZITHROMAX) 250 MG Tab, Take as directed, Disp: 6 Tab, Rfl: 0  •  albuterol (VENTOLIN OR PROVENTIL) 108 (90 BASE) MCG/ACT Aero Soln inhalation aerosol, Inhale 2 Puffs by mouth every four hours as needed for Shortness of Breath., Disp: 1 Inhaler, Rfl: 1  •  guaifenesin-codeine (CHERATUSSIN AC) Solution, Take 5-10 mL by mouth every 6 hours as needed for Cough., Disp: 90 mL, Rfl: 0  •  Naltrexone-Bupropion HCl ER 8-90 MG TABLET SR 12 HR, Take 1 Tab by mouth 2 Times a Day., Disp: 180 Tab, Rfl: 0  •  hydrocodone/acetaminophen (NORCO)  MG Tab, Take 1-2 Tabs by mouth every 6 hours as needed., Disp: 60 Tab, Rfl: 0  •  fluticasone (FLOVENT HFA) 44 MCG/ACT Aerosol, Inhale 2 Puffs by mouth 2 times a day., Disp: 3 Inhaler, Rfl: 3  •  albuterol (VENTOLIN OR PROVENTIL) 108 (90 BASE) MCG/ACT Aero Soln inhalation aerosol, Inhale 2 Puffs by mouth every 6 hours as needed for Shortness of Breath., Disp: 3 Inhaler, Rfl: 3  •  mupirocin (BACTROBAN) 2 % OINT, Apply 1 Application to affected area(s) every day., Disp: 1 Tube, Rfl: 0  •  Levocetirizine Dihydrochloride 5 MG TABS, TAKE 1 TABLET DAILY, Disp: 90 Tab, Rfl: 3  •  zolpidem (AMBIEN) 10 MG TABS, Take 1 Tab by mouth at bedtime as needed for Sleep., Disp: 90 Each, Rfl: 3  •  Oxycodone-Acetaminophen (PERCOCET-10)  MG TABS, Take 1-2 Tabs by mouth 2 Times a Day., Disp: 60 Tab, Rfl: 0  •  fluticasone (FLONASE) 50 MCG/ACT nasal spray, Spray 2 Sprays in nose every day., Disp: 3 Bottle, Rfl: 3  •  fenofibrate (TRICOR) 145 MG TABS, TAKE 1 TABLET DAILY, Disp: 90 Tab, Rfl: 2  •  NEXIUM 40 MG capsule, TAKE 1 CAPSULE DAILY, Disp: 90 Cap, Rfl: 3  •  Mometasone Furo-Formoterol Fum 200-5 MCG/ACT AERO, Inhale 1 Puff by mouth 2 Times a Day., Disp: 3 Inhaler, Rfl: 3  •  lisinopril (PRINIVIL) 10 MG TABS, Take 1 Tab by mouth every day. (Patient taking differently: Take 20 mg by mouth every day.), Disp: 90 Tab, Rfl: 3  •  vitamin D (CHOLECALCIFEROL) 1000 UNIT TABS, Take  "1,000 Units by mouth every day., Disp: , Rfl:   •  triamcinolone acetonide (KENALOG) 0.1 % CREA, Apply in a thin layer twice per day to dermatitis, Disp: 3 Tube, Rfl: 3  •  ibuprofen (MOTRIN) 600 MG TABS, Take 1 Tab by mouth every 6 hours as needed for Mild Pain., Disp: 30 Each, Rfl: 0  ALLERGIES: No Known Allergies  SURGHX:   Past Surgical History:   Procedure Laterality Date   • APPENDECTOMY     • HAND ORIF     • OTHER ORTHOPEDIC SURGERY      L wrist, R hand-4th and 5th metacarpal   • TONSILLECTOMY     • VASECTOMY      x2     SOCHX:  reports that he quit smoking about 36 years ago. His smoking use included Cigarettes. He has a 0.25 pack-year smoking history. He has never used smokeless tobacco. He reports that he drinks alcohol. He reports that he does not use drugs.  FH: Reviewed with patient/family. Not pertinent to this complaint.            Patient presents with:    Arm Pain: L elbow/ forearm pain x1wk non injury related, worse with resisted internal and external rotation . Denies injury or trauma to LUE.     Knee Pain: R knee pain radiating down to the ankle x1wk non injury related. PT states pain has been getting progressively worse \"for a while, but definitely more irritating over the last week.\" denies fall, injury, trauma.            Other   This is a new problem. Episode onset: one week. The problem occurs constantly. The problem has been unchanged. Associated symptoms include arthralgias. Pertinent negatives include no myalgias. The symptoms are aggravated by bending, exertion and twisting. He has tried NSAIDs, rest, position changes and relaxation for the symptoms. The treatment provided mild relief.       Review of Systems   Musculoskeletal: Positive for arthralgias and joint pain (right knee, left elbow/forearm). Negative for myalgias.   Neurological: Negative for tingling, sensory change and focal weakness.   All other systems reviewed and are negative.         Objective:     /88   Pulse 96  " " Temp 37 °C (98.6 °F)   Resp 18   Ht 1.702 m (5' 7\")   Wt (!) 142.4 kg (314 lb)   SpO2 93%   BMI 49.18 kg/m²      Physical Exam   Constitutional: He is oriented to person, place, and time. He appears well-developed and well-nourished. No distress.   HENT:   Head: Normocephalic and atraumatic.   Nose: Nose normal.   Eyes: Conjunctivae and EOM are normal. Pupils are equal, round, and reactive to light.   Neck: Normal range of motion. Neck supple. No JVD present.   Cardiovascular: Normal rate and intact distal pulses.    Pulmonary/Chest: Effort normal.   Abdominal: Soft.   Musculoskeletal:        Right knee: He exhibits normal range of motion, no swelling, no effusion, normal alignment, no LCL laxity, normal patellar mobility, no bony tenderness and no MCL laxity. Tenderness found. Medial joint line, lateral joint line, MCL and LCL tenderness noted. No patellar tendon tenderness noted.        Left forearm: He exhibits tenderness. He exhibits no bony tenderness, no swelling and no edema.        Arms:       Legs:  Knee: No joint laxity noted, neg Lachman's test, no joint swelling or effusion.  Distal neuro/vascular intact.     Left forearm: pain in forearm near elbow with resisted supination/pronation consistent with lateral epicondylitis.  Distal neuro/vascular intact.      Lymphadenopathy:     He has no cervical adenopathy.   Neurological: He is alert and oriented to person, place, and time.   Skin: Skin is warm and dry. Capillary refill takes less than 2 seconds.   Nursing note and vitals reviewed.    Xray images viewed and interpreted by me, confirmed by radiology:        Neg for acute fracture, dislocation, no soft tissue swelling.      Assessment/Plan:     1. Acute pain of right knee  DX-KNEE COMPLETE 4+ RIGHT    predniSONE (DELTASONE) 20 MG Tab    REFERRAL TO ORTHOPEDICS   2. Left elbow tendonitis  predniSONE (DELTASONE) 20 MG Tab    REFERRAL TO ORTHOPEDICS   Motrin/Advil/Ibuprophen 600 mg every 6 hours as " needed for pain or fever.    RICE TREATMENT FOR EXTREMITY INJURIES:  R-rest the extremity as much as possible while pain and swelling persist  I-ice the extremity 15 minutes every 2 hours for the first 24 hours, then 4-5 times daily   C-compress the extremity either with splint or ace wrap as directed  E-elevate the extremity to help with swelling    Referral placed to ortho.     Work note given.    PT should follow up with PCP in 1-2 days for re-evaluation if symptoms have not improved.  Discussed red flags and reasons to return to UC or ED.  Pt and/or family verbalized understanding of diagnosis and follow up instructions and was offered informational handout on diagnosis.  PT discharged.

## 2018-06-29 NOTE — LETTER
June 29, 2018         Patient: French Hurd   YOB: 1965   Date of Visit: 6/29/2018           To Whom it May Concern:    French Hurd was seen in my clinic on 6/29/2018. He can return to work on 6/30/2018.  He will need light duty until evaluated by Orthopedics.      If you have any questions or concerns, please don't hesitate to call.        Sincerely,           Carly Tsai P.A.-C.  Electronically Signed

## 2018-06-29 NOTE — LETTER
June 29, 2018         Patient: French Hurd   YOB: 1965   Date of Visit: 6/29/2018           To Whom it May Concern:    French Hurd was seen in my clinic on 6/29/2018. He is restricted to office work.      Tentative return to full duty 7/12/2018.  However, he will be evaluated by Orthopedics on 7/10/2018 at which time these restrictions may be extended or changed as they determine is appropriate.      If you have any questions or concerns, please don't hesitate to call.        Sincerely,           Carly Tsai P.A.-C.  Electronically Signed

## 2018-09-19 ENCOUNTER — HOSPITAL ENCOUNTER (OUTPATIENT)
Dept: LAB | Facility: MEDICAL CENTER | Age: 53
End: 2018-09-19
Attending: FAMILY MEDICINE
Payer: COMMERCIAL

## 2018-09-19 LAB
ALBUMIN SERPL BCP-MCNC: 4.3 G/DL (ref 3.2–4.9)
ALBUMIN/GLOB SERPL: 1.5 G/DL
ALP SERPL-CCNC: 66 U/L (ref 30–99)
ALT SERPL-CCNC: 46 U/L (ref 2–50)
ANION GAP SERPL CALC-SCNC: 7 MMOL/L (ref 0–11.9)
APPEARANCE UR: CLEAR
AST SERPL-CCNC: 36 U/L (ref 12–45)
BASOPHILS # BLD AUTO: 1 % (ref 0–1.8)
BASOPHILS # BLD: 0.07 K/UL (ref 0–0.12)
BILIRUB SERPL-MCNC: 0.4 MG/DL (ref 0.1–1.5)
BILIRUB UR QL STRIP.AUTO: NEGATIVE
BUN SERPL-MCNC: 16 MG/DL (ref 8–22)
CALCIUM SERPL-MCNC: 9 MG/DL (ref 8.5–10.5)
CHLORIDE SERPL-SCNC: 105 MMOL/L (ref 96–112)
CHOLEST SERPL-MCNC: 197 MG/DL (ref 100–199)
CO2 SERPL-SCNC: 25 MMOL/L (ref 20–33)
COLOR UR: YELLOW
CREAT SERPL-MCNC: 0.74 MG/DL (ref 0.5–1.4)
EOSINOPHIL # BLD AUTO: 0.23 K/UL (ref 0–0.51)
EOSINOPHIL NFR BLD: 3.3 % (ref 0–6.9)
ERYTHROCYTE [DISTWIDTH] IN BLOOD BY AUTOMATED COUNT: 43.5 FL (ref 35.9–50)
FASTING STATUS PATIENT QL REPORTED: NORMAL
GLOBULIN SER CALC-MCNC: 2.8 G/DL (ref 1.9–3.5)
GLUCOSE SERPL-MCNC: 120 MG/DL (ref 65–99)
GLUCOSE UR STRIP.AUTO-MCNC: NEGATIVE MG/DL
HCT VFR BLD AUTO: 49.1 % (ref 42–52)
HDLC SERPL-MCNC: 33 MG/DL
HGB BLD-MCNC: 16 G/DL (ref 14–18)
IMM GRANULOCYTES # BLD AUTO: 0.02 K/UL (ref 0–0.11)
IMM GRANULOCYTES NFR BLD AUTO: 0.3 % (ref 0–0.9)
KETONES UR STRIP.AUTO-MCNC: NEGATIVE MG/DL
LDLC SERPL CALC-MCNC: 116 MG/DL
LEUKOCYTE ESTERASE UR QL STRIP.AUTO: NEGATIVE
LYMPHOCYTES # BLD AUTO: 1.79 K/UL (ref 1–4.8)
LYMPHOCYTES NFR BLD: 25.9 % (ref 22–41)
MCH RBC QN AUTO: 29.7 PG (ref 27–33)
MCHC RBC AUTO-ENTMCNC: 32.6 G/DL (ref 33.7–35.3)
MCV RBC AUTO: 91.1 FL (ref 81.4–97.8)
MICRO URNS: NORMAL
MONOCYTES # BLD AUTO: 0.64 K/UL (ref 0–0.85)
MONOCYTES NFR BLD AUTO: 9.3 % (ref 0–13.4)
NEUTROPHILS # BLD AUTO: 4.16 K/UL (ref 1.82–7.42)
NEUTROPHILS NFR BLD: 60.2 % (ref 44–72)
NITRITE UR QL STRIP.AUTO: NEGATIVE
NRBC # BLD AUTO: 0 K/UL
NRBC BLD-RTO: 0 /100 WBC
PH UR STRIP.AUTO: 5.5 [PH]
PLATELET # BLD AUTO: 234 K/UL (ref 164–446)
PMV BLD AUTO: 11.6 FL (ref 9–12.9)
POTASSIUM SERPL-SCNC: 4.3 MMOL/L (ref 3.6–5.5)
PROT SERPL-MCNC: 7.1 G/DL (ref 6–8.2)
PROT UR QL STRIP: NEGATIVE MG/DL
PSA SERPL-MCNC: 0.62 NG/ML (ref 0–4)
RBC # BLD AUTO: 5.39 M/UL (ref 4.7–6.1)
RBC UR QL AUTO: NEGATIVE
SODIUM SERPL-SCNC: 137 MMOL/L (ref 135–145)
SP GR UR STRIP.AUTO: 1.02
TRIGL SERPL-MCNC: 238 MG/DL (ref 0–149)
URATE SERPL-MCNC: 6.3 MG/DL (ref 2.5–8.3)
UROBILINOGEN UR STRIP.AUTO-MCNC: 1 MG/DL
WBC # BLD AUTO: 6.9 K/UL (ref 4.8–10.8)

## 2018-09-19 PROCEDURE — 84443 ASSAY THYROID STIM HORMONE: CPT

## 2018-09-19 PROCEDURE — 81003 URINALYSIS AUTO W/O SCOPE: CPT

## 2018-09-19 PROCEDURE — 84153 ASSAY OF PSA TOTAL: CPT

## 2018-09-19 PROCEDURE — 80061 LIPID PANEL: CPT

## 2018-09-19 PROCEDURE — 84550 ASSAY OF BLOOD/URIC ACID: CPT

## 2018-09-19 PROCEDURE — 36415 COLL VENOUS BLD VENIPUNCTURE: CPT

## 2018-09-19 PROCEDURE — 85025 COMPLETE CBC W/AUTO DIFF WBC: CPT

## 2018-09-19 PROCEDURE — 83036 HEMOGLOBIN GLYCOSYLATED A1C: CPT

## 2018-09-19 PROCEDURE — 80053 COMPREHEN METABOLIC PANEL: CPT

## 2018-09-20 LAB — TSH SERPL DL<=0.005 MIU/L-ACNC: 1.64 UIU/ML (ref 0.38–5.33)

## 2018-09-22 LAB
EST. AVERAGE GLUCOSE BLD GHB EST-MCNC: 128 MG/DL
HBA1C MFR BLD: 6.1 % (ref 0–5.6)

## 2018-10-21 ENCOUNTER — OFFICE VISIT (OUTPATIENT)
Dept: URGENT CARE | Facility: PHYSICIAN GROUP | Age: 53
End: 2018-10-21
Payer: COMMERCIAL

## 2018-10-21 VITALS
OXYGEN SATURATION: 91 % | BODY MASS INDEX: 46.98 KG/M2 | SYSTOLIC BLOOD PRESSURE: 136 MMHG | RESPIRATION RATE: 18 BRPM | TEMPERATURE: 98.1 F | HEIGHT: 68 IN | DIASTOLIC BLOOD PRESSURE: 82 MMHG | WEIGHT: 310 LBS | HEART RATE: 88 BPM

## 2018-10-21 DIAGNOSIS — J06.9 URI WITH COUGH AND CONGESTION: ICD-10-CM

## 2018-10-21 DIAGNOSIS — R79.81 LOW OXYGEN SATURATION: ICD-10-CM

## 2018-10-21 DIAGNOSIS — R06.02 SOB (SHORTNESS OF BREATH): ICD-10-CM

## 2018-10-21 PROCEDURE — 99214 OFFICE O/P EST MOD 30 MIN: CPT | Performed by: PHYSICIAN ASSISTANT

## 2018-10-21 RX ORDER — PREDNISONE 10 MG/1
TABLET ORAL
Qty: 1 QUANTITY SUFFICIENT | Refills: 0 | Status: SHIPPED | OUTPATIENT
Start: 2018-10-21 | End: 2019-08-07

## 2018-10-21 RX ORDER — AZITHROMYCIN 250 MG/1
TABLET, FILM COATED ORAL
Qty: 6 TAB | Refills: 0 | Status: SHIPPED | OUTPATIENT
Start: 2018-10-21 | End: 2019-08-07

## 2018-10-21 RX ORDER — CODEINE PHOSPHATE AND GUAIFENESIN 10; 100 MG/5ML; MG/5ML
5 SOLUTION ORAL EVERY 12 HOURS PRN
Qty: 100 ML | Refills: 0 | Status: SHIPPED | OUTPATIENT
Start: 2018-10-21 | End: 2018-10-31

## 2018-10-21 NOTE — PROGRESS NOTES
Chief Complaint   Patient presents with   • Cough   • Nasal Congestion       HISTORY OF PRESENT ILLNESS: Patient is a 53 y.o. male who presents today for the following:    Cough x 48 hours  +SOB nasal congestion, postnasal drip, ear pressure,, difficulty sleeping due to cough  OTC meds tried: None  History of asthma  Denies history of tobacco use  No history of diabetes      Patient Active Problem List    Diagnosis Date Noted   • BMI 45.0-49.9, adult ([facility]) 09/22/2015   • Bronchitis with bronchospasm 08/25/2015   • Knee pain 12/23/2014   • Asthma in adult 04/24/2014   • Elevated blood sugar 01/23/2014   • Leg pain, left 10/10/2013   • Vitamin d deficiency 01/10/2012   • GERD (gastroesophageal reflux disease) 06/15/2011   • Eczema 02/03/2011   • ED (erectile dysfunction) 02/03/2011   • Environmental allergies 02/03/2011   • Insomnia 03/16/2010   • Hypertension    • Obesity    • Osteoarthritis    • Obstructive sleep apnea    • Hypercholesteremia    • Cervical spine degeneration        Allergies:Patient has no known allergies.    Current Outpatient Prescriptions Ordered in [facility]   Medication Sig Dispense Refill   • predniSONE (DELTASONE) 10 MG Tab 50 mg x 1 day; 40 mg x 1 day; 30 mg x 1 day; 20 mg x 1 day; 10 mg x 1 day 1 Quantity Sufficient 0   • azithromycin (ZITHROMAX) 250 MG Tab Use as package directs 6 Tab 0   • guaifenesin-codeine (ROBITUSSIN AC) Solution oral solution Take 5 mL by mouth every 12 hours as needed for Cough for up to 10 days. 100 mL 0   • albuterol (PROVENTIL) 2.5mg/3ml Nebu Soln solution for nebulization 3 mL by Nebulization route every four hours as needed for Shortness of Breath. 60 Bullet 2   • fluticasone (FLOVENT HFA) 44 MCG/ACT Aerosol Inhale 2 Puffs by mouth 2 times a day. 3 Inhaler 3   • albuterol (VENTOLIN OR PROVENTIL) 108 (90 BASE) MCG/ACT Aero Soln inhalation aerosol Inhale 2 Puffs by mouth every 6 hours as needed for Shortness of Breath. 3 Inhaler 3   • zolpidem (AMBIEN) 10 MG TABS Take  1 Tab by mouth at bedtime as needed for Sleep. 90 Each 3   • fluticasone (FLONASE) 50 MCG/ACT nasal spray Spray 2 Sprays in nose every day. 3 Bottle 3   • fenofibrate (TRICOR) 145 MG TABS TAKE 1 TABLET DAILY 90 Tab 2   • NEXIUM 40 MG capsule TAKE 1 CAPSULE DAILY 90 Cap 3     No current Epic-ordered facility-administered medications on file.        Past Medical History:   Diagnosis Date   • Allergy    • ASTHMA    • Back pain    • BPH (benign prostatic hypertrophy)    • Breast mass     right side between 11 and 12 o'clock at nipple   • Cervical spine degeneration    • Chest pain    • Difficulty breathing    • Earache    • ED (erectile dysfunction)    • Hypercholesteremia    • Hypertension    • Obesity    • Osteoarthritis    • Prediabetes    • Right arm pain    • Ringing in ears    • Sleep apnea     on CPAP   • Sweat, sweating, excessive    • Wears glasses        Social History   Substance Use Topics   • Smoking status: Former Smoker     Packs/day: 0.50     Years: 0.50     Types: Cigarettes     Quit date: 2/11/1982   • Smokeless tobacco: Never Used   • Alcohol use Yes      Comment: 3-4       Family Status   Relation Status   • Mo Other        pt is adopted   • Fa Other        pt is adopted   • Jan Alive   • Jan Alive   No family history on file.    Review of Systems:   Constitutional ROS: No unexpected change in weight, No weakness, No fatigue  Eye ROS: No recent significant change in vision, No eye pain, redness, discharge  Ear ROS: No drainage, No tinnitus or vertigo, No recent change in hearing  Mouth/Throat ROS: No teeth or gum problems, No bleeding gums, No tongue complaints  Neck ROS: No swollen glands, No significant pain in neck  Cardiovascular ROS: No diaphoresis, No edema, No palpitations  Gastrointestinal ROS: No change in bowel habits, No significant change in appetite, No nausea, vomiting, diarrhea, or constipation  Musculoskeletal/Extremities ROS: No peripheral edema, No pain, redness or swelling on the  "joints  Hematologic/Lymphatic ROS: No chills, No night sweats, No weight loss  Skin/Integumentary ROS: No edema, No evidence of rash, No itching      Exam:  Blood pressure 136/82, pulse 88, temperature 36.7 °C (98.1 °F), temperature source Temporal, resp. rate 18, height 1.727 m (5' 8\"), weight (!) 140.6 kg (310 lb), SpO2 91 %.  General: Well developed, well nourished. No distress.  Eye: PERRL/EOMI; conjunctivae clear, lids normal.  ENMT: Lips without lesions, MMM. Oropharynx is clear. Bilateral TMs are within normal limits.  Pulmonary: Unlabored respiratory effort. Lungs clear to auscultation, no wheezes, no rhonchi.  Slight decreased breath sounds throughout.  Cardiovascular: Regular rate and rhythm without murmur.    Neurologic: Grossly nonfocal. No facial asymmetry noted.  Lymph: No cervical lymphadenopathy noted.  Skin: Warm, dry, good turgor. No rashes in visible areas.   Psych: Normal mood. Alert and oriented x3. Judgment and insight is normal.    Assessment/Plan:  Discussed likely viral etiology.  Start with steroid.  Use antibiotic for any worsening symptoms at any point or symptoms fail to improve over the next 3-5 days.  Use all medication as directed.  Follow-up for worsening or persistent symptoms.  Discussed appropriate over-the-counter symptomatic medication, and when to return to clinic.   1. URI with cough and congestion  azithromycin (ZITHROMAX) 250 MG Tab    guaifenesin-codeine (ROBITUSSIN AC) Solution oral solution   2. SOB (shortness of breath)  predniSONE (DELTASONE) 10 MG Tab   3. Low oxygen saturation  predniSONE (DELTASONE) 10 MG Tab       "

## 2019-01-08 ENCOUNTER — OFFICE VISIT (OUTPATIENT)
Dept: URGENT CARE | Facility: PHYSICIAN GROUP | Age: 54
End: 2019-01-08
Payer: COMMERCIAL

## 2019-01-08 VITALS
DIASTOLIC BLOOD PRESSURE: 92 MMHG | HEART RATE: 84 BPM | SYSTOLIC BLOOD PRESSURE: 138 MMHG | WEIGHT: 312 LBS | OXYGEN SATURATION: 97 % | BODY MASS INDEX: 47.44 KG/M2 | TEMPERATURE: 98.4 F | RESPIRATION RATE: 16 BRPM

## 2019-01-08 DIAGNOSIS — L40.9 PSORIASIS: ICD-10-CM

## 2019-01-08 DIAGNOSIS — L03.119 CELLULITIS OF FOOT: ICD-10-CM

## 2019-01-08 DIAGNOSIS — L30.1 ECZEMA, DYSHIDROTIC: ICD-10-CM

## 2019-01-08 PROCEDURE — 99214 OFFICE O/P EST MOD 30 MIN: CPT | Performed by: PHYSICIAN ASSISTANT

## 2019-01-08 RX ORDER — TRIAMCINOLONE ACETONIDE 5 MG/G
CREAM TOPICAL
Qty: 60 G | Refills: 0 | Status: SHIPPED | OUTPATIENT
Start: 2019-01-08 | End: 2019-09-17

## 2019-01-08 RX ORDER — PREDNISONE 10 MG/1
TABLET ORAL
Qty: 1 QUANTITY SUFFICIENT | Refills: 0 | Status: SHIPPED | OUTPATIENT
Start: 2019-01-08 | End: 2019-08-07

## 2019-01-08 RX ORDER — SULFAMETHOXAZOLE AND TRIMETHOPRIM 800; 160 MG/1; MG/1
1 TABLET ORAL 2 TIMES DAILY
Qty: 20 TAB | Refills: 0 | Status: SHIPPED | OUTPATIENT
Start: 2019-01-08 | End: 2019-08-07

## 2019-01-08 RX ORDER — HYDROCODONE BITARTRATE AND ACETAMINOPHEN 5; 325 MG/1; MG/1
1 TABLET ORAL EVERY 6 HOURS PRN
Qty: 12 TAB | Refills: 0 | Status: SHIPPED | OUTPATIENT
Start: 2019-01-08 | End: 2019-01-11

## 2019-01-09 NOTE — PROGRESS NOTES
Chief Complaint   Patient presents with   • Foot Problem     (L) foot painful and swollen       HISTORY OF PRESENT ILLNESS: Patient is a 53 y.o. male who presents today for the following:    Patient comes in for evaluation of his left foot.  Patient has a history of psoriasis and eczema plaque on his left foot for the last few weeks.  He has been soaking intermittently but nothing consistently.  He has steroid cream at home but has not been using it on the lesion.  He has been using some athlete's foot powder but it does not seem to help.  He was wrapping it with co-band but then noticed a new lesion on the arch of his foot.  He now has intense itching, burning, and severe pain when he puts any weight on his foot.  Patient has been unable to sleep due to the pain.  Over-the-counter medication has not been helping.  He denies drainage from his foot, fever, night sweats, and chills.  He does report edema of the foot.  Patient does not have diabetes.    Patient Active Problem List    Diagnosis Date Noted   • BMI 45.0-49.9, adult ([facility]) 09/22/2015   • Bronchitis with bronchospasm 08/25/2015   • Knee pain 12/23/2014   • Asthma in adult 04/24/2014   • Elevated blood sugar 01/23/2014   • Leg pain, left 10/10/2013   • Vitamin d deficiency 01/10/2012   • GERD (gastroesophageal reflux disease) 06/15/2011   • Eczema 02/03/2011   • ED (erectile dysfunction) 02/03/2011   • Environmental allergies 02/03/2011   • Insomnia 03/16/2010   • Hypertension    • Obesity    • Osteoarthritis    • Obstructive sleep apnea    • Hypercholesteremia    • Cervical spine degeneration        Allergies:Patient has no known allergies.    Current Outpatient Prescriptions Ordered in [facility]   Medication Sig Dispense Refill   • predniSONE (DELTASONE) 10 MG Tab 50 mg x 1 day; 40 mg x 1 day; 30 mg x 1 day; 20 mg x 1 day; 10 mg x 1 day 1 Quantity Sufficient 0   • sulfamethoxazole-trimethoprim (BACTRIM DS) 800-160 MG tablet Take 1 Tab by mouth 2 times a day. 20  Tab 0   • triamcinolone acetonide (KENALOG) 0.5 % Cream Apply to affected area up to 4 times daily. Max use is 14 days. 60 g 0   • HYDROcodone-acetaminophen (NORCO) 5-325 MG Tab per tablet Take 1 Tab by mouth every 6 hours as needed (foot pain) for up to 3 days. 12 Tab 0   • predniSONE (DELTASONE) 10 MG Tab 50 mg x 1 day; 40 mg x 1 day; 30 mg x 1 day; 20 mg x 1 day; 10 mg x 1 day 1 Quantity Sufficient 0   • azithromycin (ZITHROMAX) 250 MG Tab Use as package directs 6 Tab 0   • albuterol (PROVENTIL) 2.5mg/3ml Nebu Soln solution for nebulization 3 mL by Nebulization route every four hours as needed for Shortness of Breath. 60 Bullet 2   • fluticasone (FLOVENT HFA) 44 MCG/ACT Aerosol Inhale 2 Puffs by mouth 2 times a day. 3 Inhaler 3   • albuterol (VENTOLIN OR PROVENTIL) 108 (90 BASE) MCG/ACT Aero Soln inhalation aerosol Inhale 2 Puffs by mouth every 6 hours as needed for Shortness of Breath. 3 Inhaler 3   • zolpidem (AMBIEN) 10 MG TABS Take 1 Tab by mouth at bedtime as needed for Sleep. 90 Each 3   • fluticasone (FLONASE) 50 MCG/ACT nasal spray Spray 2 Sprays in nose every day. 3 Bottle 3   • fenofibrate (TRICOR) 145 MG TABS TAKE 1 TABLET DAILY 90 Tab 2   • NEXIUM 40 MG capsule TAKE 1 CAPSULE DAILY 90 Cap 3     No current Epic-ordered facility-administered medications on file.        Past Medical History:   Diagnosis Date   • Allergy    • ASTHMA    • Back pain    • BPH (benign prostatic hypertrophy)    • Breast mass     right side between 11 and 12 o'clock at nipple   • Cervical spine degeneration    • Chest pain    • Difficulty breathing    • Earache    • ED (erectile dysfunction)    • Hypercholesteremia    • Hypertension    • Obesity    • Osteoarthritis    • Prediabetes    • Right arm pain    • Ringing in ears    • Sleep apnea     on CPAP   • Sweat, sweating, excessive    • Wears glasses        Social History   Substance Use Topics   • Smoking status: Former Smoker     Packs/day: 0.50     Years: 0.50     Types:  Cigarettes     Quit date: 2/11/1982   • Smokeless tobacco: Never Used   • Alcohol use Yes      Comment: 3-4       Family Status   Relation Status   • Mo Other        pt is adopted   • Fa Other        pt is adopted   • Jan Alive   • Jan Alive   No family history on file.    Review of Systems:    Constitutional ROS: No unexpected change in weight, No weakness, No fatigue  Eye ROS: No recent significant change in vision, No eye pain, redness, discharge  Ear ROS: No drainage, No tinnitus or vertigo, No recent change in hearing  Mouth/Throat ROS: No teeth or gum problems, No bleeding gums, No tongue complaints  Neck ROS: No swollen glands, No significant pain in neck  Pulmonary ROS: No chronic cough, sputum, or hemoptysis, No dyspnea on exertion, No wheezing  Cardiovascular ROS: No diaphoresis, No edema, No palpitations  Gastrointestinal ROS: No change in bowel habits, No significant change in appetite, No nausea, vomiting, diarrhea, or constipation    Exam:  Blood pressure 138/92, pulse 84, temperature 36.9 °C (98.4 °F), resp. rate 16, weight (!) 141.5 kg (312 lb), SpO2 97 %.  General: Well developed, well nourished. No distress.  Pulmonary: Unlabored respiratory effort.   Cardiovascular: Brisk capillary refill of the left foot.  Skin: Thick plaques noted, one on the dorsum of the left foot, covering most of the dorsum, and one on the anterior aspect of left ankle.  Erythema surrounds the lesion on the dorsum of the foot with cracking, soft tissue swelling, and associated tenderness with palpation.  Cluster of intradermal vesicular lesions is noted on the medial aspect of the left foot in the arch.  Psych: Normal mood. Alert and oriented x3. Judgment and insight is normal.    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  Use all medication as directed.  Discussed the importance of using the pain medication only for sleeping and must have at least 10 hours between taking  medication and starting his shift at work.  Follow-up for any worsening or persistent symptoms.    Prescription Monitoring Program report was reviewed. No evidence of medication abuse or misuse. Discussed the Controlled Substance Use Informed Consent which includes risks and benefits, proper use, storage and disposal, refills, minors, opioids and narcotics, and alternatives. I have assessed the patient’s risk for abuse, dependency, and addiction using the validated Opioid Risk Tool available at https://www.[facility].com/btwnkq-pntb-dncw-ort-narcotic-abuse. All questions answered.   1. Psoriasis  predniSONE (DELTASONE) 10 MG Tab    triamcinolone acetonide (KENALOG) 0.5 % Cream    HYDROcodone-acetaminophen (NORCO) 5-325 MG Tab per tablet   2. Eczema, dyshidrotic  predniSONE (DELTASONE) 10 MG Tab    triamcinolone acetonide (KENALOG) 0.5 % Cream    HYDROcodone-acetaminophen (NORCO) 5-325 MG Tab per tablet   3. Cellulitis of foot  sulfamethoxazole-trimethoprim (BACTRIM DS) 800-160 MG tablet    HYDROcodone-acetaminophen (NORCO) 5-325 MG Tab per tablet    Consent for Opiate Prescription

## 2019-02-09 ENCOUNTER — HOSPITAL ENCOUNTER (OUTPATIENT)
Dept: LAB | Facility: MEDICAL CENTER | Age: 54
End: 2019-02-09
Attending: FAMILY MEDICINE
Payer: COMMERCIAL

## 2019-02-09 LAB
ALBUMIN SERPL BCP-MCNC: 4.4 G/DL (ref 3.2–4.9)
ALBUMIN/GLOB SERPL: 1.5 G/DL
ALP SERPL-CCNC: 62 U/L (ref 30–99)
ALT SERPL-CCNC: 64 U/L (ref 2–50)
ANION GAP SERPL CALC-SCNC: 7 MMOL/L (ref 0–11.9)
AST SERPL-CCNC: 64 U/L (ref 12–45)
BILIRUB SERPL-MCNC: 0.8 MG/DL (ref 0.1–1.5)
BUN SERPL-MCNC: 14 MG/DL (ref 8–22)
CALCIUM SERPL-MCNC: 9.8 MG/DL (ref 8.5–10.5)
CHLORIDE SERPL-SCNC: 100 MMOL/L (ref 96–112)
CHOLEST SERPL-MCNC: 238 MG/DL (ref 100–199)
CO2 SERPL-SCNC: 28 MMOL/L (ref 20–33)
CREAT SERPL-MCNC: 0.69 MG/DL (ref 0.5–1.4)
EST. AVERAGE GLUCOSE BLD GHB EST-MCNC: 131 MG/DL
GLOBULIN SER CALC-MCNC: 3 G/DL (ref 1.9–3.5)
GLUCOSE SERPL-MCNC: 113 MG/DL (ref 65–99)
HBA1C MFR BLD: 6.2 % (ref 0–5.6)
HDLC SERPL-MCNC: 34 MG/DL
LDLC SERPL CALC-MCNC: 155 MG/DL
POTASSIUM SERPL-SCNC: 4.3 MMOL/L (ref 3.6–5.5)
PROT SERPL-MCNC: 7.4 G/DL (ref 6–8.2)
SODIUM SERPL-SCNC: 135 MMOL/L (ref 135–145)
TRIGL SERPL-MCNC: 243 MG/DL (ref 0–149)

## 2019-02-09 PROCEDURE — 83036 HEMOGLOBIN GLYCOSYLATED A1C: CPT

## 2019-02-09 PROCEDURE — 80061 LIPID PANEL: CPT

## 2019-02-09 PROCEDURE — 80053 COMPREHEN METABOLIC PANEL: CPT

## 2019-02-09 PROCEDURE — 36415 COLL VENOUS BLD VENIPUNCTURE: CPT

## 2019-02-19 ENCOUNTER — HOSPITAL ENCOUNTER (OUTPATIENT)
Dept: CARDIOLOGY | Facility: MEDICAL CENTER | Age: 54
End: 2019-02-19
Attending: FAMILY MEDICINE
Payer: COMMERCIAL

## 2019-02-19 DIAGNOSIS — R07.2 PRECORDIAL PAIN: ICD-10-CM

## 2019-02-19 PROCEDURE — 93306 TTE W/DOPPLER COMPLETE: CPT

## 2019-02-19 PROCEDURE — 93306 TTE W/DOPPLER COMPLETE: CPT | Mod: 26 | Performed by: INTERNAL MEDICINE

## 2019-02-20 LAB
LV EJECT FRACT  99904: 60
LV EJECT FRACT MOD 2C 99903: 61.75
LV EJECT FRACT MOD 4C 99902: 59.89
LV EJECT FRACT MOD BP 99901: 61.83

## 2019-02-22 ENCOUNTER — APPOINTMENT (OUTPATIENT)
Dept: RADIOLOGY | Facility: MEDICAL CENTER | Age: 54
End: 2019-02-22
Attending: FAMILY MEDICINE
Payer: COMMERCIAL

## 2019-03-08 ENCOUNTER — HOSPITAL ENCOUNTER (OUTPATIENT)
Dept: RADIOLOGY | Facility: MEDICAL CENTER | Age: 54
End: 2019-03-08
Attending: FAMILY MEDICINE
Payer: COMMERCIAL

## 2019-03-08 DIAGNOSIS — R07.2 PRECORDIAL PAIN: ICD-10-CM

## 2019-03-08 PROCEDURE — 700111 HCHG RX REV CODE 636 W/ 250 OVERRIDE (IP)

## 2019-03-08 PROCEDURE — A9502 TC99M TETROFOSMIN: HCPCS

## 2019-03-08 RX ORDER — REGADENOSON 0.08 MG/ML
INJECTION, SOLUTION INTRAVENOUS
Status: COMPLETED
Start: 2019-03-08 | End: 2019-03-08

## 2019-03-08 RX ADMIN — REGADENOSON 0.4 MG: 0.08 INJECTION, SOLUTION INTRAVENOUS at 08:14

## 2019-06-17 ENCOUNTER — HOSPITAL ENCOUNTER (OUTPATIENT)
Dept: RADIOLOGY | Facility: MEDICAL CENTER | Age: 54
End: 2019-06-17
Attending: FAMILY MEDICINE

## 2019-06-17 DIAGNOSIS — E78.2 MIXED HYPERLIPIDEMIA: ICD-10-CM

## 2019-06-17 PROCEDURE — 4410556 CT-CARDIAC SCORING

## 2019-08-07 DIAGNOSIS — Z01.812 PRE-OPERATIVE LABORATORY EXAMINATION: ICD-10-CM

## 2019-08-07 DIAGNOSIS — Z01.810 PRE-OPERATIVE CARDIOVASCULAR EXAMINATION: ICD-10-CM

## 2019-08-07 LAB — EKG IMPRESSION: NORMAL

## 2019-08-07 PROCEDURE — 93010 ELECTROCARDIOGRAM REPORT: CPT | Performed by: INTERNAL MEDICINE

## 2019-08-07 PROCEDURE — 93005 ELECTROCARDIOGRAM TRACING: CPT | Performed by: INTERNAL MEDICINE

## 2019-08-07 PROCEDURE — 80048 BASIC METABOLIC PNL TOTAL CA: CPT

## 2019-08-07 RX ORDER — IBUPROFEN 200 MG
800 TABLET ORAL
COMMUNITY
End: 2023-08-22

## 2019-08-07 NOTE — OR NURSING
"Preadmit appointment: \" Preparing for your Procedure information\" sheet given to patient with verbal and written instructions. Patient instructed to continue prescribed medications through the day before surgery, instructed to take the following medications the day of surgery per anesthesia protocol: Albuterol nebulizer or albuterol inhaler if needed, flovent as prescribed, nexium.  Pt denies issues with anesthesia. Dr. Augustin notified of admit, health summary and BMI  "

## 2019-08-08 LAB
ANION GAP SERPL CALC-SCNC: 9 MMOL/L (ref 0–11.9)
BUN SERPL-MCNC: 16 MG/DL (ref 8–22)
CALCIUM SERPL-MCNC: 9.3 MG/DL (ref 8.5–10.5)
CHLORIDE SERPL-SCNC: 106 MMOL/L (ref 96–112)
CO2 SERPL-SCNC: 25 MMOL/L (ref 20–33)
CREAT SERPL-MCNC: 0.67 MG/DL (ref 0.5–1.4)
GLUCOSE SERPL-MCNC: 94 MG/DL (ref 65–99)
POTASSIUM SERPL-SCNC: 4.1 MMOL/L (ref 3.6–5.5)
SODIUM SERPL-SCNC: 140 MMOL/L (ref 135–145)

## 2019-08-08 NOTE — OR NURSING
Nothing else to add.  Ok to proceed.  Thank you.  Lashanda Augustin M.D.  [facility]    On Aug 7, 2019, at 12:00, Marie [email or web address]> wrote:   not to Dr. Augustin  Good Afternoon,  pt is having an “average risk screening colonoscopy” with Dr. Dinh on 8/9.  BMI 46.3  Sleep apnea, compliant with CPAP and instructed to bring day of procedure along with his rescue inhaler.   Pt had a stress test 3/8/19 “negative for ischemia” and echo 2/19/19 =EF= 60%. Would you like anything else?

## 2019-08-09 ENCOUNTER — ANESTHESIA EVENT (OUTPATIENT)
Dept: SURGERY | Facility: MEDICAL CENTER | Age: 54
End: 2019-08-09
Payer: COMMERCIAL

## 2019-08-09 ENCOUNTER — ANESTHESIA (OUTPATIENT)
Dept: SURGERY | Facility: MEDICAL CENTER | Age: 54
End: 2019-08-09
Payer: COMMERCIAL

## 2019-08-09 ENCOUNTER — HOSPITAL ENCOUNTER (OUTPATIENT)
Facility: MEDICAL CENTER | Age: 54
End: 2019-08-09
Attending: INTERNAL MEDICINE | Admitting: INTERNAL MEDICINE
Payer: COMMERCIAL

## 2019-08-09 VITALS
BODY MASS INDEX: 45.44 KG/M2 | WEIGHT: 299.83 LBS | OXYGEN SATURATION: 90 % | HEART RATE: 63 BPM | SYSTOLIC BLOOD PRESSURE: 154 MMHG | DIASTOLIC BLOOD PRESSURE: 81 MMHG | HEIGHT: 68 IN | RESPIRATION RATE: 16 BRPM | TEMPERATURE: 98.2 F

## 2019-08-09 LAB — PATHOLOGY CONSULT NOTE: NORMAL

## 2019-08-09 PROCEDURE — 700105 HCHG RX REV CODE 258: Performed by: INTERNAL MEDICINE

## 2019-08-09 PROCEDURE — 160035 HCHG PACU - 1ST 60 MINS PHASE I: Performed by: INTERNAL MEDICINE

## 2019-08-09 PROCEDURE — 160025 RECOVERY II MINUTES (STATS): Performed by: INTERNAL MEDICINE

## 2019-08-09 PROCEDURE — 160046 HCHG PACU - 1ST 60 MINS PHASE II: Performed by: INTERNAL MEDICINE

## 2019-08-09 PROCEDURE — 700111 HCHG RX REV CODE 636 W/ 250 OVERRIDE (IP)

## 2019-08-09 PROCEDURE — 160009 HCHG ANES TIME/MIN: Performed by: INTERNAL MEDICINE

## 2019-08-09 PROCEDURE — 160203 HCHG ENDO MINUTES - 1ST 30 MINS LEVEL 4: Performed by: INTERNAL MEDICINE

## 2019-08-09 PROCEDURE — 700101 HCHG RX REV CODE 250

## 2019-08-09 PROCEDURE — 160036 HCHG PACU - EA ADDL 30 MINS PHASE I: Performed by: INTERNAL MEDICINE

## 2019-08-09 PROCEDURE — 501629 HCHG TUBE, LUKI TRAP STERILE DISP: Performed by: INTERNAL MEDICINE

## 2019-08-09 PROCEDURE — 160048 HCHG OR STATISTICAL LEVEL 1-5: Performed by: INTERNAL MEDICINE

## 2019-08-09 PROCEDURE — 160002 HCHG RECOVERY MINUTES (STAT): Performed by: INTERNAL MEDICINE

## 2019-08-09 PROCEDURE — 88305 TISSUE EXAM BY PATHOLOGIST: CPT

## 2019-08-09 RX ORDER — OXYCODONE HYDROCHLORIDE AND ACETAMINOPHEN 5; 325 MG/1; MG/1
1 TABLET ORAL
Status: DISCONTINUED | OUTPATIENT
Start: 2019-08-09 | End: 2019-08-09 | Stop reason: HOSPADM

## 2019-08-09 RX ORDER — OXYCODONE HYDROCHLORIDE AND ACETAMINOPHEN 5; 325 MG/1; MG/1
2 TABLET ORAL
Status: DISCONTINUED | OUTPATIENT
Start: 2019-08-09 | End: 2019-08-09 | Stop reason: HOSPADM

## 2019-08-09 RX ORDER — DIPHENHYDRAMINE HYDROCHLORIDE 50 MG/ML
12.5 INJECTION INTRAMUSCULAR; INTRAVENOUS
Status: DISCONTINUED | OUTPATIENT
Start: 2019-08-09 | End: 2019-08-09 | Stop reason: HOSPADM

## 2019-08-09 RX ORDER — SODIUM CHLORIDE, SODIUM LACTATE, POTASSIUM CHLORIDE, CALCIUM CHLORIDE 600; 310; 30; 20 MG/100ML; MG/100ML; MG/100ML; MG/100ML
1000 INJECTION, SOLUTION INTRAVENOUS
Status: DISCONTINUED | OUTPATIENT
Start: 2019-08-09 | End: 2019-08-09 | Stop reason: HOSPADM

## 2019-08-09 RX ORDER — HALOPERIDOL 5 MG/ML
1 INJECTION INTRAMUSCULAR
Status: DISCONTINUED | OUTPATIENT
Start: 2019-08-09 | End: 2019-08-09 | Stop reason: HOSPADM

## 2019-08-09 RX ORDER — ONDANSETRON 2 MG/ML
4 INJECTION INTRAMUSCULAR; INTRAVENOUS
Status: DISCONTINUED | OUTPATIENT
Start: 2019-08-09 | End: 2019-08-09 | Stop reason: HOSPADM

## 2019-08-09 RX ORDER — DEXAMETHASONE SODIUM PHOSPHATE 4 MG/ML
INJECTION, SOLUTION INTRA-ARTICULAR; INTRALESIONAL; INTRAMUSCULAR; INTRAVENOUS; SOFT TISSUE PRN
Status: DISCONTINUED | OUTPATIENT
Start: 2019-08-09 | End: 2019-08-09 | Stop reason: SURG

## 2019-08-09 RX ORDER — ONDANSETRON 2 MG/ML
INJECTION INTRAMUSCULAR; INTRAVENOUS PRN
Status: DISCONTINUED | OUTPATIENT
Start: 2019-08-09 | End: 2019-08-09 | Stop reason: SURG

## 2019-08-09 RX ORDER — ROCURONIUM BROMIDE 10 MG/ML
INJECTION, SOLUTION INTRAVENOUS PRN
Status: DISCONTINUED | OUTPATIENT
Start: 2019-08-09 | End: 2019-08-09 | Stop reason: SURG

## 2019-08-09 RX ADMIN — SODIUM CHLORIDE, POTASSIUM CHLORIDE, SODIUM LACTATE AND CALCIUM CHLORIDE 1000 ML: 600; 310; 30; 20 INJECTION, SOLUTION INTRAVENOUS at 09:51

## 2019-08-09 RX ADMIN — DEXAMETHASONE SODIUM PHOSPHATE 8 MG: 4 INJECTION, SOLUTION INTRAMUSCULAR; INTRAVENOUS at 11:32

## 2019-08-09 RX ADMIN — SUGAMMADEX 200 MG: 100 INJECTION, SOLUTION INTRAVENOUS at 11:43

## 2019-08-09 RX ADMIN — SODIUM CHLORIDE, POTASSIUM CHLORIDE, SODIUM LACTATE AND CALCIUM CHLORIDE: 600; 310; 30; 20 INJECTION, SOLUTION INTRAVENOUS at 11:19

## 2019-08-09 RX ADMIN — ONDANSETRON 8 MG: 2 INJECTION INTRAMUSCULAR; INTRAVENOUS at 11:30

## 2019-08-09 RX ADMIN — PROPOFOL 200 MG: 10 INJECTION, EMULSION INTRAVENOUS at 11:29

## 2019-08-09 RX ADMIN — ROCURONIUM BROMIDE 50 MG: 10 INJECTION INTRAVENOUS at 11:29

## 2019-08-09 NOTE — ANESTHESIA PREPROCEDURE EVALUATION
Relevant Problems   ANESTHESIA   (+) Obstructive sleep apnea      PULMONARY   (+) Asthma in adult      CARDIAC   (+) Hypertension      GI   (+) GERD (gastroesophageal reflux disease)       Physical Exam    Airway   Mallampati: II  TM distance: >3 FB  Neck ROM: full       Cardiovascular - normal exam  Rhythm: regular  Rate: normal  (-) murmur     Dental - normal exam         Pulmonary - normal exam  Breath sounds clear to auscultation     Abdominal    Neurological - normal exam                 Anesthesia Plan    ASA 2       Plan - general       Airway plan will be ETT        Induction: intravenous    Postoperative Plan: Postoperative administration of opioids is intended.    Pertinent diagnostic labs and testing reviewed    Informed Consent:    Anesthetic plan and risks discussed with patient.    Use of blood products discussed with: patient whom consented to blood products.

## 2019-08-09 NOTE — ANESTHESIA QCDR
2019 [facility] Clinical Data Registry (for Quality Improvement)     Postoperative nausea/vomiting risk protocol (Adult = 18 yrs and Pediatric 3-17 yrs)- (430 and 463)  General inhalation anesthetic (NOT TIVA) with PONV risk factors: Yes  Provision of anti-emetic therapy with at least 2 different classes of agents: Yes   Patient DID NOT receive anti-emetic therapy and reason is documented in Medical Record:  N/A    Multimodal Pain Management- (AQI59)  Patient undergoing Elective Surgery (i.e. Outpatient, or ASC, or Prescheduled Surgery prior to Hospital Admission): Yes  Use of Multimodal Pain Management, two or more drugs and/or interventions, NOT including systemic opioids: Yes   Exception: Documented allergy to multiple classes of analgesics:  N/A    PACU assessment of acute postoperative pain prior to Anesthesia Care End- Applies to Patients Age = 18- (ABG7)  Initial PACU pain score is which of the following: < 7/10  Patient unable to report pain score: N/A    Post-anesthetic transfer of care checklist/protocol to PACU/ICU- (426 and 427)  Upon conclusion of case, patient transferred to which of the following locations: PACU/Non-ICU  Use of transfer checklist/protocol: Yes  Exclusion: Service Performed in Patient Hospital Room (and thus did not require transfer): N/A    PACU Reintubation- (AQI31)  General anesthesia requiring endotracheal intubation (ETT) along with subsequent extubation in OR or PACU: No  Required reintubation in the PACU: N/A  Extubation was a planned trial documented in the medical record prior to removal of the original airway device: N/A    Unplanned admission to ICU related to anesthesia service up through end of PACU care- (MD51)  Unplanned admission to ICU (not initially anticipated at anesthesia start time): No

## 2019-08-09 NOTE — OR NURSING
"1148 To PACU from [facility] [address] [facility] via gurney, side rails up x 2 for safety, lungs clear bilaterally, pt arrives awake and asking to blow nose and coughing up clear phlegm. Given suction to use on arrival; uses independently and tissues to blow nose. Pt denies pain or nausea. BS hypoactive x 4Q. Titrated oxygen to RA; pt coughing spontaneously.   1157 Pt continues to blow nose and use suction for mucous from oral cavity. CPAP in place to nose now by patient but remains awake and using oral suction and coughing up clear phlegm.    1205 Pt resting now on gurney with CPAP continuing.   1210 Pt removes CPAP and states \"OK, I\"m done with that.\" RN asks if CPAP can be replaced and rest; pt declines and states \"I am totally awake and not going to be able to rest anymore\".   1225 Pt remains awake and talking with RN. Pulse ox 90-94% on RA. HOB remains elevated at 45 degrees.   1240 No changes; no desaturations.   1248 Pt meets criteria for transfer to stage II.   "

## 2019-08-09 NOTE — OR NURSING
0900 Patient to preop. Gowned and prepped for surgery. Consent form confirmed, vital signs taken. IV started, patient awaits MD.

## 2019-08-09 NOTE — ANESTHESIA PROCEDURE NOTES
Airway  Date/Time: 8/9/2019 11:29 AM  Performed by: Brandyn Thomson M.D.  Authorized by: Brandyn Thomosn M.D.     Location:  OR  Urgency:  Elective  Indications for Airway Management:  Anesthesia  Spontaneous Ventilation: absent    Sedation Level:  Deep  Preoxygenated: Yes    Patient Position:  Sniffing  Final Airway Type:  Endotracheal airway  Final Endotracheal Airway:  ETT  Cuffed: Yes    Technique Used for Successful ETT Placement:  Video laryngoscopy  Insertion Site:  Oral  Blade Type:  Mariella  Laryngoscope Blade/Videolaryngoscope Blade Size:  3  ETT Size (mm):  7.0  Measured from:  Teeth  ETT to Teeth (cm):  22  Placement Verified by: auscultation and capnometry    Cormack-Lehane Classification:  Grade I - full view of glottis  Number of Attempts at Approach:  1

## 2019-08-09 NOTE — PROCEDURES
DATE OF SERVICE:  08/09/2019    PROCEDURE PERFORMED:  Colonoscopy with snare polypectomy.    INSTRUMENT UTILIZED:  Olympus variable stiffness adult flexible forward   viewing colonoscope.    INDICATIONS:  Average risk colorectal cancer screening.  The patient was   scheduled with anesthesiologist assistance for his anesthesia here at the   hospital setting secondary to his weight (morbidly obese) and airway.    CONSENT:  Full RBA discussion held prior to the procedure and a signed and   witnessed consent form placed on the chart.    SEDATION AND ANESTHESIA:  Provided by Dr. Thomson.    TOLERANCE:  Excellent.    LAXATIVE PREPARATION:  MoviPrep.    QUALITY OF PREPARATION:  Good.    COLONOSCOPE WITHDRAWAL TIME:  Seven minutes.    PATHOLOGY SPECIMENS:  Sigmoid polyps x3.    PROCEDURAL DETAILS:  After adequate sedation, the procedure was begun.    External anal exam was unremarkable.  Digital rectal exam revealed normal   resting anal sphincter tone and no palpable rectal masses.  The colonoscope   was advanced per the anal canal into the rectal vault.  The colonoscope was   then advanced to the cecum, which was identified by the ileocecal valve and   appendiceal orifice.  Upon withdrawal of the colonoscope, the mucosa was   carefully inspected.  In the sigmoid colon at about 18-20 cm from the anal   verge, there were 3 sessile polyps measuring 3 mm each.  These were removed   using cold snare technique and retrieved.    Once in the rectum, retroflexion was completed, which revealed grade 2   nonbleeding internal hemorrhoids.    No immediate complications.    IMPRESSIONS AND FINDINGS:  1.  Colon polyps -- status post removal and retrieval.  2.  Internal hemorrhoids.    PLAN AND RECOMMENDATIONS:  1.  Observe for any adverse events from this procedure.  2.  Await pathology results.  I will contact the patient with pathology   results in 1-2 weeks.  Future surveillance interval versus screening interval   depends upon the  pathology results.  3.  High fiber diet, aiming for 30 grams per day.  4.  Avoid straining to stool.  5.  Consider hemorrhoid banding therapy for any symptoms attributable to   internal hemorrhoids such as bleeding or itching.       ____________________________________     MD RODRIGUEZ PENA / DENISE    DD:  08/09/2019 11:55:10  DT:  08/09/2019 12:08:50    D#:  [id number]  Job#:  [id number]    cc: SYD SCANLON MD, HUNTER JARQUIN DO

## 2019-08-09 NOTE — ANESTHESIA POSTPROCEDURE EVALUATION
Patient: French Hurd    Procedure Summary     Date:  08/09/19 Room / Location:   ENDOSCOPIC ULTRASOUND ROOM / SURGERY [facility]    Anesthesia Start:  1119 Anesthesia Stop:  1151    Procedure:  COLONOSCOPY - AVERAGE RISK SCREENING (Buttocks) Diagnosis:  (SCREENING FOR COLONIC NEOPLASIA)    Surgeon:  Gopal Dinh M.D. Responsible Provider:  Brandyn Thomson M.D.    Anesthesia Type:  general ASA Status:  2          Final Anesthesia Type: general  Last vitals  BP   Blood Pressure: 146/76    Temp   36.8 °C (98.2 °F)    Pulse   Pulse: 63   Resp   16    SpO2   96 %      Anesthesia Post Evaluation    Patient location during evaluation: PACU  Patient participation: complete - patient participated  Level of consciousness: awake and alert    Airway patency: patent  Anesthetic complications: no  Cardiovascular status: hemodynamically stable  Respiratory status: acceptable  Hydration status: euvolemic    PONV: none

## 2019-08-09 NOTE — DISCHARGE INSTRUCTIONS
ENDOSCOPY HOME CARE INSTRUCTIONS    COLONOSCOPY OR FLEXIBLE SIGMOIDOSCOPY  1. If you received a barium enema, take a mild laxative such as dulcolax, Elsie's M.O., or Milk of Magnesia to clean out the barium.  2. Drink plenty of fluids. Eat a diet high in fiber (whole-grain breads, fresh fruit and vegetables).  3. You may notice a few drops of blood with your first bowel movement. If you develop any large amount of bleeding, black stools, a fever, or abdominal pain, call your doctor right away.  4. Call your doctor for test results in 1 week(s).  5. Don't drive or drink alcohol for 24 hours. The medication you received will make you too drowsy.  6. No heavy lifting, no Aspirin products or Aspirin for 5 days   7. Additional instructions: I will contact patient with path results in one week.   Plan/Recommendations:  1) Await pathology  2) High fiber diet  3) Avoid straining to stool   8. Prescriptions: none    Dr Dinh [phone]    You should call 911 if you develop problems with breathing or chest pain.  If any questions arise, call your doctor. If your doctor is not available, please feel free to call [phone]. You can also call the [facility] HOTLINE open 24 hours/day, 7 days/week and speak to a nurse at [phone], or toll free [phone].    High-Fiber Diet  Fiber, also called dietary fiber, is a type of carbohydrate found in fruits, vegetables, whole grains, and beans. A high-fiber diet can have many health benefits. Your health care provider may recommend a high-fiber diet to help:  · Prevent constipation. Fiber can make your bowel movements more regular.  · Lower your cholesterol.  · Relieve hemorrhoids, uncomplicated diverticulosis, or irritable bowel syndrome.  · Prevent overeating as part of a weight-loss plan.  · Prevent heart disease, type 2 diabetes, and certain cancers.  What is my plan?  The recommended daily intake of fiber includes:  · 38 grams for men under age 50.  · 30 grams for men  over age 50.  · 25 grams for women under age 50.  · 21 grams for women over age 50.  You can get the recommended daily intake of dietary fiber by eating a variety of fruits, vegetables, grains, and beans. Your health care provider may also recommend a fiber supplement if it is not possible to get enough fiber through your diet.  What do I need to know about a high-fiber diet?  · Fiber supplements have not been widely studied for their effectiveness, so it is better to get fiber through food sources.  · Always check the fiber content on the nutrition facts label of any prepackaged food. Look for foods that contain at least 5 grams of fiber per serving.  · Ask your dietitian if you have questions about specific foods that are related to your condition, especially if those foods are not listed in the following section.  · Increase your daily fiber consumption gradually. Increasing your intake of dietary fiber too quickly may cause bloating, cramping, or gas.  · Drink plenty of water. Water helps you to digest fiber.  What foods can I eat?  Grains   Whole-grain breads. Multigrain cereal. Oats and oatmeal. Brown rice. Barley. Bulgur wheat. Millet. Bran muffins. Popcorn. Rye wafer crackers.  Vegetables   Sweet potatoes. Spinach. Kale. Artichokes. Cabbage. Broccoli. Green peas. Carrots. Squash.  Fruits   Berries. Pears. Apples. Oranges. Avocados. Prunes and raisins. Dried figs.  Meats and Other Protein Sources   Navy, kidney, santana, and soy beans. Split peas. Lentils. Nuts and seeds.  Dairy   Fiber-fortified yogurt.  Beverages   Fiber-fortified soy milk. Fiber-fortified orange juice.  Other   Fiber bars.  The items listed above may not be a complete list of recommended foods or beverages. Contact your dietitian for more options.   What foods are not recommended?  Grains   White bread. Pasta made with refined flour. White rice.  Vegetables   Fried potatoes. Canned vegetables. Well-cooked vegetables.  Fruits   Fruit juice.  Cooked, strained fruit.  Meats and Other Protein Sources   Fatty cuts of meat. Fried poultry or fried fish.  Dairy   Milk. Yogurt. Cream cheese. Sour cream.  Beverages   Soft drinks.  Other   Cakes and pastries. Butter and oils.  The items listed above may not be a complete list of foods and beverages to avoid. Contact your dietitian for more information.   What are some tips for including high-fiber foods in my diet?  · Eat a wide variety of high-fiber foods.  · Make sure that half of all grains consumed each day are whole grains.  · Replace breads and cereals made from refined flour or white flour with whole-grain breads and cereals.  · Replace white rice with brown rice, bulgur wheat, or millet.  · Start the day with a breakfast that is high in fiber, such as a cereal that contains at least 5 grams of fiber per serving.  · Use beans in place of meat in soups, salads, or pasta.  · Eat high-fiber snacks, such as berries, raw vegetables, nuts, or popcorn.  This information is not intended to replace advice given to you by your health care provider. Make sure you discuss any questions you have with your health care provider.  Document Released: 12/18/2006 Document Revised: 05/25/2017 Document Reviewed: 06/02/2015  [employer] Interactive Patient Education © 2017 [employer]    Depression / Suicide Risk    As you are discharged from this [facility] facility, it is important to learn how to keep safe from harming yourself.    Recognize the warning signs:  · Abrupt changes in personality, positive or negative- including increase in energy   · Giving away possessions  · Change in eating patterns- significant weight changes-  positive or negative  · Change in sleeping patterns- unable to sleep or sleeping all the time   · Unwillingness or inability to communicate  · Depression  · Unusual sadness, discouragement and loneliness  · Talk of wanting to die  · Neglect of personal appearance   · Rebelliousness- reckless  behavior  · Withdrawal from people/activities they love  · Confusion- inability to concentrate     If you or a loved one observes any of these behaviors or has concerns about self-harm, here's what you can do:  · Talk about it- your feelings and reasons for harming yourself  · Remove any means that you might use to hurt yourself (examples: pills, rope, extension cords, firearm)  · Get professional help from the community (Mental Health, Substance Abuse, psychological counseling)  · Do not be alone:Call your Safe Contact- someone whom you trust who will be there for you.  · Call your local CRISIS HOTLINE 731-2137 or 370-168-9119  · Call your local Children's Mobile Crisis Response Team [place] (373) 125-0205 or www.[employer]  · Call the toll free National Suicide Prevention Hotlines   · National Suicide Prevention Lifeline 660-817-MBWC (1290)  · [facility] 800-SUICIDE (990-8521)    I acknowledge receipt and understanding of these Home Care Instructions.    Discharge Education for patients on JOSSY (Obstructive Sleep Apnea) Protocol    We recommend that you should be with an adult observer for at least 24 hours after your sedation/anesthesia.  If you have a CPAP machine, you should wear it during any sleep period (day or night) for the week following your procedure.  We encourage you to sleep on your side or in a sitting position, even with napping.  Lying flat on your back increases the risk of apnea and airway obstruction during your post procedure recovery period.    It is important to prevent over-sedation that could increase your risk for apnea.  Please take all pain medication as directed by your physician.  If you are not getting pain relief, please contact your physician to discuss possible approaches to relieving pain while minimizing medications that can affect your breathing and oxygen levels.

## 2019-08-22 ENCOUNTER — OFFICE VISIT (OUTPATIENT)
Dept: URGENT CARE | Facility: PHYSICIAN GROUP | Age: 54
End: 2019-08-22
Payer: COMMERCIAL

## 2019-08-22 VITALS
DIASTOLIC BLOOD PRESSURE: 86 MMHG | SYSTOLIC BLOOD PRESSURE: 140 MMHG | BODY MASS INDEX: 44.85 KG/M2 | TEMPERATURE: 98.4 F | RESPIRATION RATE: 18 BRPM | WEIGHT: 295 LBS | OXYGEN SATURATION: 97 % | HEART RATE: 107 BPM

## 2019-08-22 DIAGNOSIS — B35.9 TINEA: ICD-10-CM

## 2019-08-22 PROCEDURE — 99214 OFFICE O/P EST MOD 30 MIN: CPT | Performed by: PHYSICIAN ASSISTANT

## 2019-08-22 RX ORDER — PREDNISONE 10 MG/1
TABLET ORAL
Qty: 1 QUANTITY SUFFICIENT | Refills: 1 | Status: SHIPPED | OUTPATIENT
Start: 2019-08-22 | End: 2019-09-17

## 2019-08-22 RX ORDER — FLUCONAZOLE 100 MG/1
100 TABLET ORAL DAILY
Qty: 5 TAB | Refills: 1 | Status: SHIPPED | OUTPATIENT
Start: 2019-08-22 | End: 2019-09-17

## 2019-08-23 NOTE — PROGRESS NOTES
Chief Complaint   Patient presents with   • Rash     x 2 month        HISTORY OF PRESENT ILLNESS: Patient is a 53 y.o. male who presents today for the following:    Rash x 5-7 days  Worsening  Bilateral axillary regions, groin, scattered arms/legs  H/o skin issues  C/o itching; denies pain  Has used prednisone/fluconazole in the past    Patient Active Problem List    Diagnosis Date Noted   • BMI 45.0-49.9, adult (Formerly [facility]) 09/22/2015   • Bronchitis with bronchospasm 08/25/2015   • Knee pain 12/23/2014   • Asthma in adult 04/24/2014   • Elevated blood sugar 01/23/2014   • Leg pain, left 10/10/2013   • Vitamin d deficiency 01/10/2012   • GERD (gastroesophageal reflux disease) 06/15/2011   • Eczema 02/03/2011   • ED (erectile dysfunction) 02/03/2011   • Environmental allergies 02/03/2011   • Insomnia 03/16/2010   • Hypertension    • Obesity    • Osteoarthritis    • Obstructive sleep apnea    • Hypercholesteremia    • Cervical spine degeneration        Allergies:Patient has no known allergies.    Current Outpatient Medications Ordered in Epic   Medication Sig Dispense Refill   • fluconazole (DIFLUCAN) 100 MG Tab Take 1 Tab by mouth every day. 5 Tab 1   • predniSONE (DELTASONE) 10 MG Tab 50 mg x 1 day; 40 mg x 1 day; 30 mg x 1 day; 20 mg x 1 day; 10 mg x 1 day 1 Quantity Sufficient 1   • ibuprofen (MOTRIN) 200 MG Tab Take 200 mg by mouth every 6 hours as needed.     • triamcinolone acetonide (KENALOG) 0.5 % Cream Apply to affected area up to 4 times daily. Max use is 14 days. 60 g 0   • albuterol (PROVENTIL) 2.5mg/3ml Nebu Soln solution for nebulization 3 mL by Nebulization route every four hours as needed for Shortness of Breath. 60 Bullet 2   • fluticasone (FLOVENT HFA) 44 MCG/ACT Aerosol Inhale 2 Puffs by mouth 2 times a day. (Patient taking differently: Inhale 2 Puffs by mouth as needed.) 3 Inhaler 3   • albuterol (VENTOLIN OR PROVENTIL) 108 (90 BASE) MCG/ACT Aero Soln inhalation aerosol Inhale 2 Puffs by mouth every 6  hours as needed for Shortness of Breath. 3 Inhaler 3   • zolpidem (AMBIEN) 10 MG TABS Take 1 Tab by mouth at bedtime as needed for Sleep. 90 Each 3   • fenofibrate (TRICOR) 145 MG TABS TAKE 1 TABLET DAILY 90 Tab 2   • NEXIUM 40 MG capsule TAKE 1 CAPSULE DAILY 90 Cap 3     No current Epic-ordered facility-administered medications on file.        Past Medical History:   Diagnosis Date   • Allergy    • ASTHMA    • Back pain    • BPH (benign prostatic hypertrophy)    • Breast mass     right side between 11 and 12 o'clock at nipple   • Cervical spine degeneration    • Chest pain    • Difficulty breathing    • Earache    • ED (erectile dysfunction)    • High cholesterol    • Hypercholesteremia    • Hypertension    • Obesity    • Osteoarthritis    • Prediabetes    • Right arm pain    • Ringing in ears    • Sleep apnea     on CPAP   • Sweat, sweating, excessive    • Wears glasses        Social History     Tobacco Use   • Smoking status: Former Smoker     Packs/day: 0.50     Years: 0.50     Pack years: 0.25     Types: Cigarettes     Last attempt to quit: 1982     Years since quittin.5   • Smokeless tobacco: Never Used   Substance Use Topics   • Alcohol use: Yes     Comment: 2 per week   • Drug use: No     Comment: hx of ephedirn for weight loss over 2 years ago       Family Status   Relation Name Status   • Mo  Other        pt is adopted   • Fa  Other        pt is adopted   • Jan  Alive   • Jan  Alive   No family history on file.    Review of Systems:    Constitutional ROS: No unexpected change in weight, No weakness, No fatigue  Eye ROS: No recent significant change in vision, No eye pain, redness, discharge  Ear ROS: No drainage, No tinnitus or vertigo, No recent change in hearing  Mouth/Throat ROS: No teeth or gum problems, No bleeding gums, No tongue complaints  Neck ROS: No swollen glands, No significant pain in neck  Pulmonary ROS: No chronic cough, sputum, or hemoptysis, No dyspnea on exertion, No  wheezing  Cardiovascular ROS: No diaphoresis, No edema, No palpitations  Gastrointestinal ROS: No change in bowel habits, No significant change in appetite, No nausea, vomiting, diarrhea, or constipation  Musculoskeletal/Extremities ROS: No peripheral edema, No pain, redness or swelling on the joints  Hematologic/Lymphatic ROS: No chills, No night sweats, No weight loss  Skin/Integumentary ROS: + rash.      Exam:  /86   Pulse (!) 107   Temp 36.9 °C (98.4 °F) (Temporal)   Resp 18   Wt (!) 133.8 kg (295 lb)   SpO2 97%   General: Well developed, well nourished. No distress.  Pulmonary: Unlabored respiratory effort.   Neurologic: Grossly nonfocal. No facial asymmetry noted.  Skin: Warm, dry, good turgor. Marked erythema bilateral axilla with well demarcated borders.   Groin: deferred.  Psych: Normal mood. Alert and oriented x3. Judgment and insight is normal.    Assessment/Plan:  Use all medication as directed.  Follow-up for worsening or persistent symptoms per follow up for worsening or persistent symptoms.  1. Tinea  fluconazole (DIFLUCAN) 100 MG Tab    predniSONE (DELTASONE) 10 MG Tab

## 2019-09-17 ENCOUNTER — HOSPITAL ENCOUNTER (EMERGENCY)
Facility: MEDICAL CENTER | Age: 54
End: 2019-09-17
Attending: EMERGENCY MEDICINE
Payer: COMMERCIAL

## 2019-09-17 ENCOUNTER — APPOINTMENT (OUTPATIENT)
Dept: RADIOLOGY | Facility: MEDICAL CENTER | Age: 54
End: 2019-09-17
Attending: EMERGENCY MEDICINE
Payer: COMMERCIAL

## 2019-09-17 VITALS
BODY MASS INDEX: 46.11 KG/M2 | DIASTOLIC BLOOD PRESSURE: 88 MMHG | HEART RATE: 85 BPM | TEMPERATURE: 98.6 F | WEIGHT: 304.24 LBS | OXYGEN SATURATION: 95 % | RESPIRATION RATE: 18 BRPM | HEIGHT: 68 IN | SYSTOLIC BLOOD PRESSURE: 155 MMHG

## 2019-09-17 DIAGNOSIS — S93.504A: ICD-10-CM

## 2019-09-17 PROCEDURE — 73630 X-RAY EXAM OF FOOT: CPT | Mod: RT

## 2019-09-17 PROCEDURE — 99283 EMERGENCY DEPT VISIT LOW MDM: CPT

## 2019-09-17 RX ORDER — ESOMEPRAZOLE MAGNESIUM 40 MG/1
40 CAPSULE, DELAYED RELEASE ORAL EVERY EVENING
Status: SHIPPED | COMMUNITY
End: 2020-10-23

## 2019-09-17 RX ORDER — FENOFIBRATE 145 MG/1
145 TABLET, COATED ORAL EVERY EVENING
COMMUNITY

## 2019-09-17 NOTE — ED TRIAGE NOTES
"Chief Complaint   Patient presents with   • T-5000 FALL     tripped over dogs, right toe pain     Pt ambulatory to triage with small limp.    /88   Pulse 85   Temp 37 °C (98.6 °F) (Temporal)   Resp 18   Ht 1.727 m (5' 8\")   Wt (!) 138 kg (304 lb 3.8 oz)   SpO2 95%   Pt informed of wait times. Educated on triage process.  Asked to return to triage RN for any new or worsening of symptoms. Thanked for patience.        "

## 2019-09-17 NOTE — DISCHARGE INSTRUCTIONS
Keep your toes latasha taped to help splint and then provide some comfort.  Wear your postop shoe if that feels comfortable.  Otherwise wear loosely fitting shoes that provide a lot of room for your toes.    Use ice to help with the discomfort.    Take over-the-counter Tylenol ibuprofen to help with the pain.    At this time your x-ray was found to be negative.  There is no obvious fractures, but the feet are sneaky and can sometimes hide hairline cracks which are not seen on x-ray.  For this reason it is important to follow-up with the orthopedist if you continue to have pain much past the next several days.  Please follow-up with Dr. Hardin, orthopedic surgeon, within the next 2 to 3 days.  Please call for appointment.    Return to the ER for any worsening pain, worsening swelling, worsening discoloration, numbness, tingling, difficulty walking, or for any concerns.

## 2019-09-17 NOTE — ED PROVIDER NOTES
ED Provider Note  CHIEF COMPLAINT  Chief Complaint   Patient presents with   • T-5000 FALL     tripped over dogs, right toe pain       HPI  French Hurd is a 54 y.o. male who presents with complaint of right third toe pain which occurred today after he tripped over his dog.  He states his foot rolled under him.  Initially he did not have much pain.  He then placed his shoe on and then noticed that his third toe was painful.  It then started turning a little bit purple.  No numbness or tingling.  He did not hit his head or get knocked out.  No neck pain.  No back pain.  No rib pain.  No abdominal pain.  No hip pain.    REVIEW OF SYSTEMS  Positive for right third toe pain. Negative for numbness, tingling, abrasion, head injury, rib pain, abdominal pain, hip pain.  PAST MEDICAL HISTORY   has a past medical history of Allergy, ASTHMA, Back pain, BPH (benign prostatic hypertrophy), Breast mass, Cervical spine degeneration, Chest pain, Difficulty breathing, Earache, ED (erectile dysfunction), High cholesterol, Hypercholesteremia, Hypertension, Obesity, Osteoarthritis, Prediabetes, Right arm pain, Ringing in ears, Sleep apnea, Sweat, sweating, excessive, and Wears glasses.    SOCIAL HISTORY  Social History     Tobacco Use   • Smoking status: Former Smoker     Packs/day: 0.50     Years: 0.50     Pack years: 0.25     Types: Cigarettes     Last attempt to quit: 1982     Years since quittin.6   • Smokeless tobacco: Never Used   Substance and Sexual Activity   • Alcohol use: Yes     Comment: 2 per week   • Drug use: No     Comment: hx of ephedirn for weight loss over 2 years ago   • Sexual activity: Yes     Partners: Female       SURGICAL HISTORY   has a past surgical history that includes tonsillectomy (); vasectomy; other orthopedic surgery (Left, ); hardware removal ortho (Right, 2017); ligament reconstruction (Left, ); appendectomy (); cervical disk and fusion anterior (); ankle orif  "(Right, 1987); hardware removal ortho (Right, 1987); and colonoscopy - endo (8/9/2019).    CURRENT MEDICATIONS  Reviewed.  See Encounter Summary.     ALLERGIES  No Known Allergies    PHYSICAL EXAM  VITAL SIGNS: /88   Pulse 85   Temp 37 °C (98.6 °F) (Temporal)   Resp 18   Ht 1.727 m (5' 8\")   Wt (!) 138 kg (304 lb 3.8 oz)   SpO2 95%   BMI 46.26 kg/m²   Constitutional: Alert in no apparent distress.  HENT: Normocephalic, atraumatic; Bilateral external ears normal. Nose normal.   Eyes: Pupils are equal and reactive. Conjunctiva normal, non-icteric.   Thorax & Lungs: Non-labored respirations  Skin: Warm and Dry, No erythema, No rash.   Extremities:   Full range of motion; right lower extremity: The third toe is tender to palpation.  There is a slight subtle hue of ecchymosis over the dorsal surface of the third toe.  Remaining toes are nontender.  There is no laceration or skin defects to the foot or toes.  Ankle, calcaneus and foot nontender.  2+ pedal pulses.  Sensation is intact light touch.  Good capillary refill.  Neurologic: Alert and oriented x 4, clear speech   Psychiatric: Affect and Mood normal      COURSE & MEDICAL DECISION MAKING  Nursing notes and vital signs were reviewed. (See chart for details)    DX-FOOT-COMPLETE 3+ RIGHT   Final Result      No evidence of acute fracture or dislocation.          The patient presents to the Emergency Department with complaint of right third toe pain after he tripped over his dog today.  He states his foot rolled under itself.  He fell to the ground but he did not strike his head.  No LOC.  No concern for head injury.  No neck pain.  No back pain.  No abdominal pain.  No rib pain.  No hip pain.  He is able to walk.  No numbness or tingling.  He has tenderness over the right third toe.  There is some ecchymosis over the third toe.  Sensation and intact light touch.  Good capillary refill.  X-rays read as negative.  Patient will be latasha tape.  He will be given " a postop shoe.  He understands that there is no obvious fracture however the feet are sneaky and sometimes occult fractures can be present when the x-rays otherwise negative.  For this reason he is to follow-up with orthopedics.  He is been referred to Ortho.  He is to call within the next several days to schedule follow-up appointment if his pain persist.  He is to ice and elevate.  He can take over-the-counter Tylenol Motrin.  He is been given strict return precautions and discharge instructions and he understands treatment plan and follow-up.    Patient has been referred back to his primary care physician for blood pressure management      Discharge Medications: OTC Tylenol and Motrin      FINAL IMPRESSION  1. Sprain of third toe of right foot, initial encounter Acute     This dictation has been created using voice recognition software. The accuracy of the dictation is limited by the abilities of the software. I expect there may be some errors of grammar and possibly content. I made every attempt to manually correct the errors within my dictation. However, errors related to voice recognition software may still exist and should be interpreted within the appropriate context.

## 2019-11-05 ENCOUNTER — OFFICE VISIT (OUTPATIENT)
Dept: URGENT CARE | Facility: PHYSICIAN GROUP | Age: 54
End: 2019-11-05
Payer: COMMERCIAL

## 2019-11-05 ENCOUNTER — HOSPITAL ENCOUNTER (OUTPATIENT)
Facility: MEDICAL CENTER | Age: 54
End: 2019-11-05
Attending: PHYSICIAN ASSISTANT
Payer: COMMERCIAL

## 2019-11-05 VITALS
DIASTOLIC BLOOD PRESSURE: 88 MMHG | WEIGHT: 302 LBS | OXYGEN SATURATION: 97 % | HEART RATE: 86 BPM | BODY MASS INDEX: 45.92 KG/M2 | SYSTOLIC BLOOD PRESSURE: 146 MMHG | RESPIRATION RATE: 16 BRPM | TEMPERATURE: 97.6 F

## 2019-11-05 DIAGNOSIS — R31.9 HEMATURIA, UNSPECIFIED TYPE: ICD-10-CM

## 2019-11-05 LAB
APPEARANCE UR: NORMAL
BILIRUB UR STRIP-MCNC: NEGATIVE MG/DL
COLOR UR AUTO: NORMAL
GLUCOSE UR STRIP.AUTO-MCNC: NEGATIVE MG/DL
KETONES UR STRIP.AUTO-MCNC: NEGATIVE MG/DL
LEUKOCYTE ESTERASE UR QL STRIP.AUTO: NEGATIVE
NITRITE UR QL STRIP.AUTO: NORMAL
PH UR STRIP.AUTO: 7.5 [PH] (ref 5–8)
PROT UR QL STRIP: 30 MG/DL
RBC UR QL AUTO: NORMAL
SP GR UR STRIP.AUTO: 1.02
UROBILINOGEN UR STRIP-MCNC: 4 MG/DL

## 2019-11-05 PROCEDURE — 81002 URINALYSIS NONAUTO W/O SCOPE: CPT | Performed by: PHYSICIAN ASSISTANT

## 2019-11-05 PROCEDURE — 87086 URINE CULTURE/COLONY COUNT: CPT

## 2019-11-05 PROCEDURE — 99214 OFFICE O/P EST MOD 30 MIN: CPT | Performed by: PHYSICIAN ASSISTANT

## 2019-11-05 PROCEDURE — 99000 SPECIMEN HANDLING OFFICE-LAB: CPT | Performed by: PHYSICIAN ASSISTANT

## 2019-11-05 RX ORDER — LISINOPRIL 20 MG/1
20 TABLET ORAL EVERY EVENING
COMMUNITY
End: 2021-07-07

## 2019-11-05 RX ORDER — FEXOFENADINE HCL 180 MG/1
180 TABLET ORAL
COMMUNITY

## 2019-11-05 NOTE — PROGRESS NOTES
Chief Complaint   Patient presents with   • UTI     urinating blood, started this morning, no pain upon urination no increased frequency        HISTORY OF PRESENT ILLNESS: Patient is a 54 y.o. male who presents today for the following:    Patient comes in for evaluation of possible hematuria.  He noticed what appeared to be hematuria this morning.  He has been trying to hydrate throughout the day to see if that might be part of the issue but it has not changed.  He denies any pain in his back or abdomen.  He denies history of same.  He denies history of renal stone and any significant smoking history.  He has a history of appendectomy but otherwise no other abdominal surgeries.    Patient Active Problem List    Diagnosis Date Noted   • BMI 45.0-49.9, adult ([facility]) 09/22/2015   • Bronchitis with bronchospasm 08/25/2015   • Knee pain 12/23/2014   • Asthma in adult 04/24/2014   • Elevated blood sugar 01/23/2014   • Leg pain, left 10/10/2013   • Vitamin d deficiency 01/10/2012   • GERD (gastroesophageal reflux disease) 06/15/2011   • Eczema 02/03/2011   • ED (erectile dysfunction) 02/03/2011   • Environmental allergies 02/03/2011   • Insomnia 03/16/2010   • Hypertension    • Obesity    • Osteoarthritis    • Obstructive sleep apnea    • Hypercholesteremia    • Cervical spine degeneration        Allergies:Patient has no known allergies.    Current Outpatient Medications Ordered in Epic   Medication Sig Dispense Refill   • lisinopril (PRINIVIL) 20 MG Tab Take 20 mg by mouth.     • fexofenadine (ALLEGRA) 180 MG tablet Take 180 mg by mouth.     • fenofibrate (TRICOR) 145 MG Tab Take 145 mg by mouth every evening.     • esomeprazole (NEXIUM) 40 MG delayed-release capsule Take 40 mg by mouth every evening.     • zolpidem (AMBIEN) 10 MG TABS Take 1 Tab by mouth at bedtime as needed for Sleep. 90 Each 3   • ibuprofen (MOTRIN) 200 MG Tab Take 800 mg by mouth every 6 hours as needed for Mild Pain.       No current Epic-ordered  facility-administered medications on file.        Past Medical History:   Diagnosis Date   • Allergy    • ASTHMA    • Back pain    • BPH (benign prostatic hypertrophy)    • Breast mass     right side between 11 and 12 o'clock at nipple   • Cervical spine degeneration    • Chest pain    • Difficulty breathing    • Earache    • ED (erectile dysfunction)    • High cholesterol    • Hypercholesteremia    • Hypertension    • Obesity    • Osteoarthritis    • Prediabetes    • Right arm pain    • Ringing in ears    • Sleep apnea     on CPAP   • Sweat, sweating, excessive    • Wears glasses        Social History     Tobacco Use   • Smoking status: Former Smoker     Packs/day: 0.50     Years: 0.50     Pack years: 0.25     Types: Cigarettes     Last attempt to quit: 1982     Years since quittin.7   • Smokeless tobacco: Never Used   Substance Use Topics   • Alcohol use: Yes     Comment: 2 per week   • Drug use: No     Comment: hx of ephedirn for weight loss over 2 years ago       Family Status   Relation Name Status   • Mo  Other        pt is adopted   • Fa  Other        pt is adopted   • Jan  Alive   • Jan  Alive   History reviewed. No pertinent family history.    Review of Systems:   Constitutional ROS: No unexpected change in weight, No weakness, No fatigue  Eye ROS: No recent significant change in vision, No eye pain, redness, discharge  Ear ROS: No drainage, No tinnitus or vertigo, No recent change in hearing  Mouth/Throat ROS: No teeth or gum problems, No bleeding gums, No tongue complaints  Neck ROS: No swollen glands, No significant pain in neck  Pulmonary ROS: No chronic cough, sputum, or hemoptysis, No dyspnea on exertion, No wheezing  Cardiovascular ROS: No diaphoresis, No edema, No palpitations  Gastrointestinal ROS: No change in bowel habits, No significant change in appetite, No nausea, vomiting, diarrhea, or constipation  Musculoskeletal/Extremities ROS: No peripheral edema, No pain, redness or swelling  on the joints  Hematologic/Lymphatic ROS: No chills, No night sweats, No weight loss  Skin/Integumentary ROS: No edema, No evidence of rash, No itching      Exam:  /88   Pulse 86   Temp 36.4 °C (97.6 °F)   Resp 16   Wt (!) 137 kg (302 lb)   SpO2 97%   General: Well developed, well nourished. No distress.  Pulmonary: Unlabored respiratory effort. Lungs clear to auscultation, no wheezes, no rhonchi.  Cardiovascular: Regular rate and rhythm without murmur.   Back: No CVA tenderness noted.  Abdomen: Soft, nondistended, nontender to palpation.  No hepatosplenomegaly noted.  Neurologic: Grossly nonfocal. No facial asymmetry noted.  Skin: Warm, dry, good turgor. No rashes in visible areas.   Psych: Normal mood. Alert and oriented x3. Judgment and insight is normal.    UA: Large blood, 30 protein, urobilinogen, otherwise negative    Assessment/Plan:  Patient has a slight elevation in LFTs from his CMP dated 2/5/2019.  Will repeat blood work and order a CT renal colic, urine culture, and refer patient back to his primary care.  Discussed he may need urology referral but patient would like to follow-up with his PCP first.  Discussed red flags and ER precautions.  Will contact patient with all results.  1. Hematuria, unspecified type  POCT Urinalysis    CT-RENAL COLIC EVALUATION(A/P W/O)    Comp Metabolic Panel    CBC WITH DIFFERENTIAL    URINE CULTURE(NEW)

## 2019-11-07 LAB
BACTERIA UR CULT: NORMAL
SIGNIFICANT IND 70042: NORMAL
SITE SITE: NORMAL
SOURCE SOURCE: NORMAL

## 2019-11-13 ENCOUNTER — HOSPITAL ENCOUNTER (OUTPATIENT)
Dept: LAB | Facility: MEDICAL CENTER | Age: 54
End: 2019-11-13
Attending: PHYSICIAN ASSISTANT
Payer: COMMERCIAL

## 2019-11-13 ENCOUNTER — HOSPITAL ENCOUNTER (OUTPATIENT)
Dept: LAB | Facility: MEDICAL CENTER | Age: 54
End: 2019-11-13
Attending: FAMILY MEDICINE
Payer: COMMERCIAL

## 2019-11-13 DIAGNOSIS — R31.9 HEMATURIA, UNSPECIFIED TYPE: ICD-10-CM

## 2019-11-13 LAB
ALBUMIN SERPL BCP-MCNC: 4.2 G/DL (ref 3.2–4.9)
ALBUMIN SERPL BCP-MCNC: 4.2 G/DL (ref 3.2–4.9)
ALBUMIN/GLOB SERPL: 1.7 G/DL
ALBUMIN/GLOB SERPL: 1.8 G/DL
ALP SERPL-CCNC: 66 U/L (ref 30–99)
ALP SERPL-CCNC: 67 U/L (ref 30–99)
ALT SERPL-CCNC: 39 U/L (ref 2–50)
ALT SERPL-CCNC: 40 U/L (ref 2–50)
ANION GAP SERPL CALC-SCNC: 8 MMOL/L (ref 0–11.9)
ANION GAP SERPL CALC-SCNC: 9 MMOL/L (ref 0–11.9)
AST SERPL-CCNC: 30 U/L (ref 12–45)
AST SERPL-CCNC: 32 U/L (ref 12–45)
BASOPHILS # BLD AUTO: 1 % (ref 0–1.8)
BASOPHILS # BLD: 0.07 K/UL (ref 0–0.12)
BILIRUB SERPL-MCNC: 0.4 MG/DL (ref 0.1–1.5)
BILIRUB SERPL-MCNC: 0.4 MG/DL (ref 0.1–1.5)
BUN SERPL-MCNC: 13 MG/DL (ref 8–22)
BUN SERPL-MCNC: 13 MG/DL (ref 8–22)
CALCIUM SERPL-MCNC: 8.6 MG/DL (ref 8.5–10.5)
CALCIUM SERPL-MCNC: 8.7 MG/DL (ref 8.5–10.5)
CHLORIDE SERPL-SCNC: 107 MMOL/L (ref 96–112)
CHLORIDE SERPL-SCNC: 107 MMOL/L (ref 96–112)
CHOLEST SERPL-MCNC: 180 MG/DL (ref 100–199)
CO2 SERPL-SCNC: 23 MMOL/L (ref 20–33)
CO2 SERPL-SCNC: 23 MMOL/L (ref 20–33)
CREAT SERPL-MCNC: 0.72 MG/DL (ref 0.5–1.4)
CREAT SERPL-MCNC: 0.76 MG/DL (ref 0.5–1.4)
EOSINOPHIL # BLD AUTO: 0.31 K/UL (ref 0–0.51)
EOSINOPHIL NFR BLD: 4.4 % (ref 0–6.9)
ERYTHROCYTE [DISTWIDTH] IN BLOOD BY AUTOMATED COUNT: 43.3 FL (ref 35.9–50)
FASTING STATUS PATIENT QL REPORTED: NORMAL
GLOBULIN SER CALC-MCNC: 2.4 G/DL (ref 1.9–3.5)
GLOBULIN SER CALC-MCNC: 2.5 G/DL (ref 1.9–3.5)
GLUCOSE SERPL-MCNC: 105 MG/DL (ref 65–99)
GLUCOSE SERPL-MCNC: 105 MG/DL (ref 65–99)
HCT VFR BLD AUTO: 49.6 % (ref 42–52)
HDLC SERPL-MCNC: 31 MG/DL
HGB BLD-MCNC: 16.3 G/DL (ref 14–18)
IMM GRANULOCYTES # BLD AUTO: 0.03 K/UL (ref 0–0.11)
IMM GRANULOCYTES NFR BLD AUTO: 0.4 % (ref 0–0.9)
LDLC SERPL CALC-MCNC: 115 MG/DL
LYMPHOCYTES # BLD AUTO: 2 K/UL (ref 1–4.8)
LYMPHOCYTES NFR BLD: 28.7 % (ref 22–41)
MCH RBC QN AUTO: 30.7 PG (ref 27–33)
MCHC RBC AUTO-ENTMCNC: 32.9 G/DL (ref 33.7–35.3)
MCV RBC AUTO: 93.4 FL (ref 81.4–97.8)
MONOCYTES # BLD AUTO: 0.89 K/UL (ref 0–0.85)
MONOCYTES NFR BLD AUTO: 12.8 % (ref 0–13.4)
NEUTROPHILS # BLD AUTO: 3.68 K/UL (ref 1.82–7.42)
NEUTROPHILS NFR BLD: 52.7 % (ref 44–72)
NRBC # BLD AUTO: 0 K/UL
NRBC BLD-RTO: 0 /100 WBC
PLATELET # BLD AUTO: 228 K/UL (ref 164–446)
PMV BLD AUTO: 11.5 FL (ref 9–12.9)
POTASSIUM SERPL-SCNC: 4.3 MMOL/L (ref 3.6–5.5)
POTASSIUM SERPL-SCNC: 4.3 MMOL/L (ref 3.6–5.5)
PROT SERPL-MCNC: 6.6 G/DL (ref 6–8.2)
PROT SERPL-MCNC: 6.7 G/DL (ref 6–8.2)
RBC # BLD AUTO: 5.31 M/UL (ref 4.7–6.1)
SODIUM SERPL-SCNC: 138 MMOL/L (ref 135–145)
SODIUM SERPL-SCNC: 139 MMOL/L (ref 135–145)
TRIGL SERPL-MCNC: 170 MG/DL (ref 0–149)
WBC # BLD AUTO: 7 K/UL (ref 4.8–10.8)

## 2019-11-13 PROCEDURE — 83036 HEMOGLOBIN GLYCOSYLATED A1C: CPT

## 2019-11-13 PROCEDURE — 80061 LIPID PANEL: CPT

## 2019-11-13 PROCEDURE — 85025 COMPLETE CBC W/AUTO DIFF WBC: CPT

## 2019-11-13 PROCEDURE — 36415 COLL VENOUS BLD VENIPUNCTURE: CPT

## 2019-11-13 PROCEDURE — 80053 COMPREHEN METABOLIC PANEL: CPT | Mod: 91

## 2019-11-13 PROCEDURE — 80053 COMPREHEN METABOLIC PANEL: CPT

## 2019-11-14 ENCOUNTER — APPOINTMENT (OUTPATIENT)
Dept: RADIOLOGY | Facility: MEDICAL CENTER | Age: 54
End: 2019-11-14
Attending: PHYSICIAN ASSISTANT
Payer: COMMERCIAL

## 2019-11-14 LAB
EST. AVERAGE GLUCOSE BLD GHB EST-MCNC: 120 MG/DL
HBA1C MFR BLD: 5.8 % (ref 0–5.6)

## 2019-11-15 ENCOUNTER — APPOINTMENT (OUTPATIENT)
Dept: RADIOLOGY | Facility: MEDICAL CENTER | Age: 54
End: 2019-11-15
Attending: PHYSICIAN ASSISTANT
Payer: COMMERCIAL

## 2019-11-18 ENCOUNTER — TELEPHONE (OUTPATIENT)
Dept: URGENT CARE | Facility: PHYSICIAN GROUP | Age: 54
End: 2019-11-18

## 2019-11-18 NOTE — TELEPHONE ENCOUNTER
Called left voice mail for patient to call office.    ----- Message from Cordelia Bond P.A.-C. sent at 11/10/2019  6:49 AM PST -----  Please let pt know the urine culture is negative. Follow up for persistent symptoms.

## 2019-11-19 ENCOUNTER — HOSPITAL ENCOUNTER (OUTPATIENT)
Dept: RADIOLOGY | Facility: MEDICAL CENTER | Age: 54
End: 2019-11-19
Attending: PHYSICIAN ASSISTANT
Payer: COMMERCIAL

## 2019-11-19 DIAGNOSIS — R31.9 HEMATURIA, UNSPECIFIED TYPE: ICD-10-CM

## 2019-11-19 PROCEDURE — 74176 CT ABD & PELVIS W/O CONTRAST: CPT

## 2019-12-27 ENCOUNTER — OFFICE VISIT (OUTPATIENT)
Dept: URGENT CARE | Facility: CLINIC | Age: 54
End: 2019-12-27
Payer: COMMERCIAL

## 2019-12-27 VITALS
SYSTOLIC BLOOD PRESSURE: 140 MMHG | DIASTOLIC BLOOD PRESSURE: 88 MMHG | WEIGHT: 302.03 LBS | HEART RATE: 88 BPM | BODY MASS INDEX: 45.78 KG/M2 | TEMPERATURE: 97 F | RESPIRATION RATE: 16 BRPM | HEIGHT: 68 IN | OXYGEN SATURATION: 95 %

## 2019-12-27 DIAGNOSIS — J40 BRONCHITIS: ICD-10-CM

## 2019-12-27 DIAGNOSIS — J45.909 UNCOMPLICATED ASTHMA, UNSPECIFIED ASTHMA SEVERITY, UNSPECIFIED WHETHER PERSISTENT: ICD-10-CM

## 2019-12-27 PROCEDURE — 99214 OFFICE O/P EST MOD 30 MIN: CPT | Performed by: FAMILY MEDICINE

## 2019-12-27 RX ORDER — BUDESONIDE AND FORMOTEROL FUMARATE DIHYDRATE 160; 4.5 UG/1; UG/1
2 AEROSOL RESPIRATORY (INHALATION)
COMMUNITY
End: 2020-10-23

## 2019-12-27 RX ORDER — AMLODIPINE BESYLATE 5 MG/1
5 TABLET ORAL DAILY
COMMUNITY
Start: 2019-11-17 | End: 2024-01-18

## 2019-12-27 RX ORDER — ALBUTEROL SULFATE 90 UG/1
2 AEROSOL, METERED RESPIRATORY (INHALATION)
COMMUNITY
End: 2021-11-30

## 2019-12-27 RX ORDER — TIZANIDINE 4 MG/1
4 TABLET ORAL
COMMUNITY
End: 2023-06-14 | Stop reason: SDUPTHER

## 2019-12-27 RX ORDER — DOXYCYCLINE HYCLATE 100 MG
100 TABLET ORAL 2 TIMES DAILY
Qty: 20 TAB | Refills: 0 | Status: SHIPPED | OUTPATIENT
Start: 2019-12-27 | End: 2020-01-06

## 2019-12-27 RX ORDER — FLUTICASONE PROPIONATE 50 MCG
1 SPRAY, SUSPENSION (ML) NASAL
COMMUNITY
End: 2021-07-07

## 2019-12-27 ASSESSMENT — ENCOUNTER SYMPTOMS
DIZZINESS: 0
NAUSEA: 0
SORE THROAT: 0
EYE PAIN: 0
SPUTUM PRODUCTION: 1
SHORTNESS OF BREATH: 0
CHILLS: 0
FEVER: 0
VOMITING: 0
MYALGIAS: 0

## 2019-12-27 NOTE — PROGRESS NOTES
"Subjective:   French Hurd is a 54 y.o. male who presents for Sinusitis (Cough/Sinus pressure)        54-year-old male with a history of asthma, obstructive sleep apnea presents to the urgent care with a chief complaint of cough and sinus pressure.  The patient has been exposed to an ill contact in the house.  Patient denies dyspnea at this time.  The cough is productive of green-yellow sputum worse in the morning.  The patient has required the use of nebulizer at night for the past 4-5 nights.  At baseline the patient has no baseline requirement for daily nebulizer treatment.    Sinusitis   Associated symptoms include congestion. Pertinent negatives include no chills, shortness of breath or sore throat.     Review of Systems   Constitutional: Negative for chills and fever.   HENT: Positive for congestion. Negative for sore throat.    Eyes: Negative for pain.   Respiratory: Positive for sputum production. Negative for shortness of breath.    Cardiovascular: Negative for chest pain.   Gastrointestinal: Negative for nausea and vomiting.   Genitourinary: Negative for hematuria.   Musculoskeletal: Negative for myalgias.   Skin: Negative for rash.   Neurological: Negative for dizziness.     No Known Allergies   Objective:   /88   Pulse 88   Temp 36.1 °C (97 °F) (Temporal)   Resp 16   Ht 1.727 m (5' 8\")   Wt (!) 137 kg (302 lb 0.5 oz)   SpO2 95%   BMI 45.92 kg/m²   Physical Exam  Vitals signs and nursing note reviewed.   Constitutional:       General: He is not in acute distress.     Appearance: He is well-developed.   HENT:      Head: Normocephalic and atraumatic.      Right Ear: External ear normal.      Left Ear: External ear normal.      Nose: Mucosal edema and rhinorrhea present.      Right Turbinates: Swollen.      Left Turbinates: Swollen.      Mouth/Throat:      Mouth: Mucous membranes are moist.      Pharynx: Posterior oropharyngeal erythema present.   Eyes:      Conjunctiva/sclera: " Conjunctivae normal.      Pupils: Pupils are equal, round, and reactive to light.   Cardiovascular:      Rate and Rhythm: Normal rate and regular rhythm.      Heart sounds: No murmur.   Pulmonary:      Effort: Pulmonary effort is normal. No respiratory distress.      Breath sounds: Rhonchi present.   Abdominal:      General: There is no distension.      Palpations: Abdomen is soft.      Tenderness: There is no tenderness.   Musculoskeletal: Normal range of motion.   Skin:     General: Skin is warm and dry.   Neurological:      General: No focal deficit present.      Mental Status: He is alert and oriented to person, place, and time. Mental status is at baseline.      Gait: Gait (gait at baseline) normal.   Psychiatric:         Judgment: Judgment normal.           Assessment/Plan:   1. Bronchitis  - doxycycline (VIBRAMYCIN) 100 MG Tab; Take 1 Tab by mouth 2 times a day for 10 days.  Dispense: 20 Tab; Refill: 0    2. Uncomplicated asthma, unspecified asthma severity, unspecified whether persistent    Other orders  - albuterol 108 (90 Base) MCG/ACT Aero Soln inhalation aerosol; Inhale 1-2 Puffs by mouth.  - amLODIPine (NORVASC) 5 MG Tab  - budesonide-formoterol (SYMBICORT) 160-4.5 MCG/ACT Aerosol; Inhale 2 Puffs by mouth.  - fluticasone (FLONASE) 50 MCG/ACT nasal spray; Spray 1 Spray in nose.  - tizanidine (ZANAFLEX) 4 MG Tab; Take 4 mg by mouth.        Differential diagnosis, natural history, supportive care, and indications for immediate follow-up discussed.     Advised the patient to follow-up with the primary care physician for recheck, reevaluation, and consideration of further management.

## 2019-12-27 NOTE — PATIENT INSTRUCTIONS

## 2020-02-07 ENCOUNTER — OFFICE VISIT (OUTPATIENT)
Dept: URGENT CARE | Facility: CLINIC | Age: 55
End: 2020-02-07
Payer: COMMERCIAL

## 2020-02-07 VITALS
WEIGHT: 299.83 LBS | SYSTOLIC BLOOD PRESSURE: 144 MMHG | HEART RATE: 82 BPM | HEIGHT: 68 IN | OXYGEN SATURATION: 98 % | BODY MASS INDEX: 45.44 KG/M2 | DIASTOLIC BLOOD PRESSURE: 88 MMHG | RESPIRATION RATE: 16 BRPM | TEMPERATURE: 98.7 F

## 2020-02-07 DIAGNOSIS — J45.21 MILD INTERMITTENT ASTHMA WITH EXACERBATION: ICD-10-CM

## 2020-02-07 PROBLEM — J45.31 MILD PERSISTENT ASTHMA WITH ACUTE EXACERBATION: Status: ACTIVE | Noted: 2017-05-21

## 2020-02-07 PROCEDURE — 99203 OFFICE O/P NEW LOW 30 MIN: CPT | Performed by: INTERNAL MEDICINE

## 2020-02-07 RX ORDER — PREDNISONE 20 MG/1
20 TABLET ORAL DAILY
Qty: 7 TAB | Refills: 0 | Status: SHIPPED | OUTPATIENT
Start: 2020-02-07 | End: 2020-02-14

## 2020-02-07 RX ORDER — ALBUTEROL SULFATE 1.25 MG/3ML
SOLUTION RESPIRATORY (INHALATION)
COMMUNITY
Start: 2020-01-14 | End: 2020-10-23

## 2020-02-07 RX ORDER — FENOFIBRATE 145 MG/1
145 TABLET, COATED ORAL
COMMUNITY
End: 2020-10-23

## 2020-02-07 ASSESSMENT — ENCOUNTER SYMPTOMS
FEVER: 0
COUGH: 1
WHEEZING: 1
SHORTNESS OF BREATH: 1

## 2020-02-08 NOTE — PROGRESS NOTES
Subjective:     French Hurd is a 54 y.o. male who presents for Cough (x 1 month / congestion / )       Cough   This is a new problem. The current episode started more than 1 month ago. The problem has been waxing and waning. The problem occurs constantly. The cough is productive of sputum. Associated symptoms include nasal congestion, postnasal drip, shortness of breath and wheezing. Pertinent negatives include no fever.     Past Medical History:   Diagnosis Date   • Allergy    • ASTHMA    • Back pain    • BPH (benign prostatic hypertrophy)    • Breast mass     right side between 11 and 12 o'clock at nipple   • Cervical spine degeneration    • Chest pain    • Difficulty breathing    • Earache    • ED (erectile dysfunction)    • High cholesterol    • Hypercholesteremia    • Hypertension    • Obesity    • Osteoarthritis    • Prediabetes    • Right arm pain    • Ringing in ears    • Sleep apnea     on CPAP   • Sweat, sweating, excessive    • Wears glasses      Past Surgical History:   Procedure Laterality Date   • COLONOSCOPY - ENDO  8/9/2019    Procedure: COLONOSCOPY - AVERAGE RISK SCREENING;  Surgeon: Gopal Dinh M.D.;  Location: SURGERY [facility];  Service: Gastroenterology   • HARDWARE REMOVAL ORTHO Right 2017    right hand   • CERVICAL DISK AND FUSION ANTERIOR  2017    C5,6,7   • APPENDECTOMY  2001   • OTHER ORTHOPEDIC SURGERY Left 1993    L wrist, R hand-4th and 5th metacarpal   • LIGAMENT RECONSTRUCTION Left 1991    ulna   • ANKLE ORIF Right 1987   • HARDWARE REMOVAL ORTHO Right 1987    right ankle   • TONSILLECTOMY  1983   • VASECTOMY      x2     Social History     Socioeconomic History   • Marital status:      Spouse name: Not on file   • Number of children: Not on file   • Years of education: Not on file   • Highest education level: Not on file   Occupational History     Employer: OTHER     Comment: pt is adopted and does not know family history   Social Needs   • Financial  "resource strain: Not on file   • Food insecurity:     Worry: Not on file     Inability: Not on file   • Transportation needs:     Medical: Not on file     Non-medical: Not on file   Tobacco Use   • Smoking status: Former Smoker     Packs/day: 0.50     Years: 0.50     Pack years: 0.25     Types: Cigarettes     Last attempt to quit: 1982     Years since quittin.0   • Smokeless tobacco: Never Used   Substance and Sexual Activity   • Alcohol use: Yes     Comment: 2 per week   • Drug use: No     Comment: hx of ephedirn for weight loss over 2 years ago   • Sexual activity: Yes     Partners: Female   Lifestyle   • Physical activity:     Days per week: Not on file     Minutes per session: Not on file   • Stress: Not on file   Relationships   • Social connections:     Talks on phone: Not on file     Gets together: Not on file     Attends Orthodox service: Not on file     Active member of club or organization: Not on file     Attends meetings of clubs or organizations: Not on file     Relationship status: Not on file   • Intimate partner violence:     Fear of current or ex partner: Not on file     Emotionally abused: Not on file     Physically abused: Not on file     Forced sexual activity: Not on file   Other Topics Concern   • Not on file   Social History Narrative   • Not on file    History reviewed. No pertinent family history. Review of Systems   Constitutional: Negative for fever.   HENT: Positive for postnasal drip.    Respiratory: Positive for cough, shortness of breath and wheezing.    All other systems reviewed and are negative.  No Known Allergies   Objective:   /88   Pulse 82   Temp 37.1 °C (98.7 °F) (Temporal)   Resp 16   Ht 1.727 m (5' 8\")   Wt (!) 136 kg (299 lb 13.2 oz)   SpO2 98%   BMI 45.59 kg/m²   Physical Exam  Constitutional:       General: He is not in acute distress.     Appearance: He is well-developed.   HENT:      Head: Normocephalic and atraumatic.      Right Ear: Tympanic " membrane, ear canal and external ear normal.      Left Ear: Tympanic membrane, ear canal and external ear normal.      Nose: Mucosal edema and rhinorrhea present.      Mouth/Throat:      Mouth: Mucous membranes are moist.      Pharynx: Oropharynx is clear. Posterior oropharyngeal erythema present.   Eyes:      Conjunctiva/sclera: Conjunctivae normal.   Neck:      Musculoskeletal: No neck rigidity.   Cardiovascular:      Rate and Rhythm: Normal rate and regular rhythm.   Pulmonary:      Effort: Pulmonary effort is normal. No respiratory distress.      Breath sounds: Wheezing (mild) present.   Lymphadenopathy:      Cervical: No cervical adenopathy.   Skin:     General: Skin is warm and dry.      Capillary Refill: Capillary refill takes less than 2 seconds.   Neurological:      Mental Status: He is alert and oriented to person, place, and time.      Sensory: No sensory deficit.      Deep Tendon Reflexes: Reflexes are normal and symmetric.   Psychiatric:         Mood and Affect: Mood normal.         Behavior: Behavior normal.           Assessment/Plan:   Assessment    1. Mild intermittent asthma with exacerbation  - predniSONE (DELTASONE) 20 MG Tab; Take 1 Tab by mouth every day for 7 days.  Dispense: 7 Tab; Refill: 0    Other orders  - albuterol (ACCUNEB) 1.25 MG/3ML nebulizer solution  - esomeprazole (NEXIUM) 20 MG capsule; Take 20 mg by mouth.  - fenofibrate (TRICOR) 145 MG Tab; Take 145 mg by mouth.      Differential diagnosis, natural history, supportive care, and indications for immediate follow-up discussed.

## 2020-02-08 NOTE — PATIENT INSTRUCTIONS
Asthma, Acute Bronchospasm  Acute bronchospasm caused by asthma is also referred to as an asthma attack. Bronchospasm means your air passages become narrowed. The narrowing is caused by inflammation and tightening of the muscles in the air tubes (bronchi) in your lungs. This can make it hard to breathe or cause you to wheeze and cough.  What are the causes?  Possible triggers are:  · Animal dander from the skin, hair, or feathers of animals.  · Dust mites contained in house dust.  · Cockroaches.  · Pollen from trees or grass.  · Mold.  · Cigarette or tobacco smoke.  · Air pollutants such as dust, household , hair sprays, aerosol sprays, paint fumes, strong chemicals, or strong odors.  · Cold air or weather changes. Cold air may trigger inflammation. Winds increase molds and pollens in the air.  · Strong emotions such as crying or laughing hard.  · Stress.  · Certain medicines such as aspirin or beta-blockers.  · Sulfites in foods and drinks, such as dried fruits and wine.  · Infections or inflammatory conditions, such as a flu, cold, or inflammation of the nasal membranes (rhinitis).  · Gastroesophageal reflux disease (GERD). GERD is a condition where stomach acid backs up into your esophagus.  · Exercise or strenuous activity.  What are the signs or symptoms?  · Wheezing.  · Excessive coughing, particularly at night.  · Chest tightness.  · Shortness of breath.  How is this diagnosed?  Your health care provider will ask you about your medical history and perform a physical exam. A chest X-ray or blood testing may be performed to look for other causes of your symptoms or other conditions that may have triggered your asthma attack.  How is this treated?  Treatment is aimed at reducing inflammation and opening up the airways in your lungs. Most asthma attacks are treated with inhaled medicines. These include quick relief or rescue medicines (such as bronchodilators) and controller medicines (such as inhaled  corticosteroids). These medicines are sometimes given through an inhaler or a nebulizer. Systemic steroid medicine taken by mouth or given through an IV tube also can be used to reduce the inflammation when an attack is moderate or severe. Antibiotic medicines are only used if a bacterial infection is present.  Follow these instructions at home:  · Rest.  · Drink plenty of liquids. This helps the mucus to remain thin and be easily coughed up. Only use caffeine in moderation and do not use alcohol until you have recovered from your illness.  · Do not smoke. Avoid being exposed to secondhand smoke.  · You play a critical role in keeping yourself in good health. Avoid exposure to things that cause you to wheeze or to have breathing problems.  · Keep your medicines up-to-date and available. Carefully follow your health care provider’s treatment plan.  · Take your medicine exactly as prescribed.  · When pollen or pollution is bad, keep windows closed and use an air conditioner or go to places with air conditioning.  · Asthma requires careful medical care. See your health care provider for a follow-up as advised. If you are more than 24 weeks pregnant and you were prescribed any new medicines, let your obstetrician know about the visit and how you are doing. Follow up with your health care provider as directed.  · After you have recovered from your asthma attack, make an appointment with your outpatient doctor to talk about ways to reduce the likelihood of future attacks. If you do not have a doctor who manages your asthma, make an appointment with a primary care doctor to discuss your asthma.  Get help right away if:  · You are getting worse.  · You have trouble breathing. If severe, call your local emergency services (911 in the [place]  · You develop chest pain or discomfort.  · You are vomiting.  · You are not able to keep fluids down.  · You are coughing up yellow, green, brown, or bloody sputum.  · You have a fever  and your symptoms suddenly get worse.  · You have trouble swallowing.  This information is not intended to replace advice given to you by your health care provider. Make sure you discuss any questions you have with your health care provider.  Document Released: 04/03/2008 Document Revised: 05/31/2017 Document Reviewed: 06/25/2014  [employer] Interactive Patient Education © 2017 [employer]

## 2020-03-11 ENCOUNTER — APPOINTMENT (RX ONLY)
Dept: URBAN - NONMETROPOLITAN AREA CLINIC 15 | Facility: CLINIC | Age: 55
Setting detail: DERMATOLOGY
End: 2020-03-11

## 2020-03-11 DIAGNOSIS — B35.3 TINEA PEDIS: ICD-10-CM

## 2020-03-11 DIAGNOSIS — L29.8 OTHER PRURITUS: ICD-10-CM

## 2020-03-11 DIAGNOSIS — L30.4 ERYTHEMA INTERTRIGO: ICD-10-CM

## 2020-03-11 DIAGNOSIS — L29.89 OTHER PRURITUS: ICD-10-CM

## 2020-03-11 DIAGNOSIS — R21 RASH AND OTHER NONSPECIFIC SKIN ERUPTION: ICD-10-CM

## 2020-03-11 PROCEDURE — 96372 THER/PROPH/DIAG INJ SC/IM: CPT

## 2020-03-11 PROCEDURE — ? ORDER TESTS

## 2020-03-11 PROCEDURE — ? PRESCRIPTION

## 2020-03-11 PROCEDURE — ? COUNSELING

## 2020-03-11 PROCEDURE — 99213 OFFICE O/P EST LOW 20 MIN: CPT | Mod: 25

## 2020-03-11 PROCEDURE — ? INTRAMUSCULAR KENALOG

## 2020-03-11 RX ORDER — CICLOPIROX 7.7 MG/G
GEL TOPICAL
Qty: 1 | Refills: 12 | Status: ERX | COMMUNITY
Start: 2020-03-11

## 2020-03-11 RX ORDER — CLOTRIMAZOLE AND BETAMETHASONE DIPROPIONATE 10; .5 MG/G; MG/G
CREAM TOPICAL BID
Qty: 2 | Refills: 1 | Status: ERX | COMMUNITY
Start: 2020-03-11

## 2020-03-11 RX ADMIN — CLOTRIMAZOLE AND BETAMETHASONE DIPROPIONATE: 10; .5 CREAM TOPICAL at 00:00

## 2020-03-11 RX ADMIN — CICLOPIROX: 7.7 GEL TOPICAL at 00:00

## 2020-03-11 ASSESSMENT — LOCATION DETAILED DESCRIPTION DERM
LOCATION DETAILED: RIGHT AXILLARY VAULT
LOCATION DETAILED: LEFT BUTTOCK
LOCATION DETAILED: LEFT DORSAL FOOT
LOCATION DETAILED: RIGHT DISTAL PRETIBIAL REGION
LOCATION DETAILED: LEFT AXILLARY VAULT
LOCATION DETAILED: RIGHT DORSAL FOOT
LOCATION DETAILED: 1ST WEBSPACE LEFT FOOT
LOCATION DETAILED: 1ST WEBSPACE RIGHT FOOT
LOCATION DETAILED: LEFT ANKLE
LOCATION DETAILED: LEFT DORSAL GREAT TOE

## 2020-03-11 ASSESSMENT — LOCATION SIMPLE DESCRIPTION DERM
LOCATION SIMPLE: LEFT FOOT
LOCATION SIMPLE: LEFT AXILLARY VAULT
LOCATION SIMPLE: RIGHT PRETIBIAL REGION
LOCATION SIMPLE: LEFT BUTTOCK
LOCATION SIMPLE: LEFT GREAT TOE
LOCATION SIMPLE: RIGHT FOOT
LOCATION SIMPLE: RIGHT AXILLARY VAULT
LOCATION SIMPLE: LEFT ANKLE

## 2020-03-11 ASSESSMENT — LOCATION ZONE DERM
LOCATION ZONE: LEG
LOCATION ZONE: TRUNK
LOCATION ZONE: AXILLAE
LOCATION ZONE: TOE
LOCATION ZONE: FEET

## 2020-03-11 NOTE — PROCEDURE: INTRAMUSCULAR KENALOG
Kenalog Preparation: kenalog
Add Option For Additional Mediation: No
Concentration (Mg/Ml) Of Additional Medication: 2.5
Consent: The risks of atrophy were reviewed with the patient.
Detail Level: None
Treatment Number (Optional): 1
Expiration Date (Optional): 05/2021
Concentration (Mg/Ml): 10.0
Lot # (Optional): [id number]

## 2020-03-11 NOTE — PROCEDURE: ORDER TESTS
Expected Date Of Service: 03/11/2020
Performing Laboratory: [id number]
Bill For Surgical Tray: no
Billing Type: Third-Party Bill

## 2020-08-18 DIAGNOSIS — L40.9 PSORIASIS: ICD-10-CM

## 2020-08-18 DIAGNOSIS — L30.1 ECZEMA, DYSHIDROTIC: ICD-10-CM

## 2020-08-18 RX ORDER — TRIAMCINOLONE ACETONIDE 5 MG/G
CREAM TOPICAL
Qty: 60 G | Refills: 0 | Status: SHIPPED | OUTPATIENT
Start: 2020-08-18 | End: 2020-10-23

## 2020-09-20 ENCOUNTER — OFFICE VISIT (OUTPATIENT)
Dept: URGENT CARE | Facility: PHYSICIAN GROUP | Age: 55
End: 2020-09-20
Payer: COMMERCIAL

## 2020-09-20 VITALS
HEIGHT: 68 IN | RESPIRATION RATE: 14 BRPM | SYSTOLIC BLOOD PRESSURE: 138 MMHG | HEART RATE: 80 BPM | WEIGHT: 302 LBS | BODY MASS INDEX: 45.77 KG/M2 | OXYGEN SATURATION: 96 % | DIASTOLIC BLOOD PRESSURE: 80 MMHG | TEMPERATURE: 96.8 F

## 2020-09-20 DIAGNOSIS — J45.21 MILD INTERMITTENT ASTHMA WITH EXACERBATION: ICD-10-CM

## 2020-09-20 DIAGNOSIS — R06.02 SOB (SHORTNESS OF BREATH): ICD-10-CM

## 2020-09-20 DIAGNOSIS — J45.909 UNCOMPLICATED ASTHMA, UNSPECIFIED ASTHMA SEVERITY, UNSPECIFIED WHETHER PERSISTENT: ICD-10-CM

## 2020-09-20 PROCEDURE — 99214 OFFICE O/P EST MOD 30 MIN: CPT | Performed by: PHYSICIAN ASSISTANT

## 2020-09-20 RX ORDER — PREDNISONE 10 MG/1
TABLET ORAL
Qty: 1 QUANTITY SUFFICIENT | Refills: 0 | Status: SHIPPED | OUTPATIENT
Start: 2020-09-20 | End: 2020-10-23

## 2020-09-20 ASSESSMENT — FIBROSIS 4 INDEX: FIB4 SCORE: 1.12
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
## 2020-10-03 ENCOUNTER — OFFICE VISIT (OUTPATIENT)
Dept: URGENT CARE | Facility: PHYSICIAN GROUP | Age: 55
End: 2020-10-03
Payer: COMMERCIAL

## 2020-10-03 VITALS
SYSTOLIC BLOOD PRESSURE: 160 MMHG | WEIGHT: 306.4 LBS | DIASTOLIC BLOOD PRESSURE: 78 MMHG | BODY MASS INDEX: 46.44 KG/M2 | HEIGHT: 68 IN | OXYGEN SATURATION: 94 % | TEMPERATURE: 97.4 F | HEART RATE: 86 BPM | RESPIRATION RATE: 14 BRPM

## 2020-10-03 DIAGNOSIS — R06.02 SHORTNESS OF BREATH: ICD-10-CM

## 2020-10-03 DIAGNOSIS — J45.20 INTERMITTENT ASTHMA, UNSPECIFIED ASTHMA SEVERITY, UNSPECIFIED WHETHER COMPLICATED: ICD-10-CM

## 2020-10-03 PROCEDURE — 99214 OFFICE O/P EST MOD 30 MIN: CPT | Performed by: NURSE PRACTITIONER

## 2020-10-03 RX ORDER — PREDNISONE 10 MG/1
TABLET ORAL
Qty: 15 TAB | Refills: 0 | Status: SHIPPED | OUTPATIENT
Start: 2020-10-03 | End: 2020-10-23

## 2020-10-03 ASSESSMENT — ENCOUNTER SYMPTOMS
SHORTNESS OF BREATH: 1
COUGH: 1
WHEEZING: 1

## 2020-10-03 ASSESSMENT — FIBROSIS 4 INDEX: FIB4 SCORE: 1.12

## 2020-10-03 NOTE — PROGRESS NOTES
Subjective:      French Hurd is a 55 y.o. male who presents with Shortness of Breath (asthmatic. would like something to get him able to breath better. )    Past Medical History:   Diagnosis Date   • Allergy    • ASTHMA    • Back pain    • BPH (benign prostatic hypertrophy)    • Breast mass     right side between 11 and 12 o'clock at nipple   • Cervical spine degeneration    • Chest pain    • Difficulty breathing    • Earache    • ED (erectile dysfunction)    • High cholesterol    • Hypercholesteremia    • Hypertension    • Obesity    • Osteoarthritis    • Prediabetes    • Right arm pain    • Ringing in ears    • Sleep apnea     on CPAP   • Sweat, sweating, excessive    • Wears glasses      Social History     Socioeconomic History   • Marital status:      Spouse name: Not on file   • Number of children: Not on file   • Years of education: Not on file   • Highest education level: Not on file   Occupational History     Employer: OTHER     Comment: pt is adopted and does not know family history   Social Needs   • Financial resource strain: Not on file   • Food insecurity     Worry: Not on file     Inability: Not on file   • Transportation needs     Medical: Not on file     Non-medical: Not on file   Tobacco Use   • Smoking status: Former Smoker     Packs/day: 0.50     Years: 0.50     Pack years: 0.25     Types: Cigarettes     Quit date: 1982     Years since quittin.6   • Smokeless tobacco: Never Used   Substance and Sexual Activity   • Alcohol use: Yes     Comment: 2 per week   • Drug use: No     Comment: hx of ephedirn for weight loss over 2 years ago   • Sexual activity: Yes     Partners: Female   Lifestyle   • Physical activity     Days per week: Not on file     Minutes per session: Not on file   • Stress: Not on file   Relationships   • Social connections     Talks on phone: Not on file     Gets together: Not on file     Attends [employer] service: Not on file     Active member of club or  "organization: Not on file     Attends meetings of clubs or organizations: Not on file     Relationship status: Not on file   • Intimate partner violence     Fear of current or ex partner: Not on file     Emotionally abused: Not on file     Physically abused: Not on file     Forced sexual activity: Not on file   Other Topics Concern   • Not on file   Social History Narrative   • Not on file     History reviewed. No pertinent family history.    Allergies: Patient has no known allergies.    Patient is a 55-year-old male who presents today with complaint of continued asthma and shortness of breath.  He has been on prednisone recently which was helpful, however after he finishes it his symptoms return.  He has been on Advair previously and reports good results with that.  He has not ever seen a pulmonary specialist        Other  This is a chronic problem. The problem occurs constantly. The problem has been unchanged. Associated symptoms include coughing. Nothing aggravates the symptoms. He has tried nothing for the symptoms. The treatment provided no relief.       Review of Systems   Respiratory: Positive for cough, shortness of breath and wheezing.    All other systems reviewed and are negative.         Objective:     /78   Pulse 86   Temp 36.3 °C (97.4 °F) (Temporal)   Resp 14   Ht 1.727 m (5' 8\")   Wt (!) 139 kg (306 lb 6.4 oz)   SpO2 94%   BMI 46.59 kg/m²      Physical Exam  Vitals signs reviewed.   Constitutional:       Appearance: Normal appearance.   HENT:      Head: Normocephalic and atraumatic.      Right Ear: Tympanic membrane, ear canal and external ear normal.      Left Ear: Tympanic membrane, ear canal and external ear normal.      Nose: Nose normal.      Mouth/Throat:      Mouth: Mucous membranes are moist.   Eyes:      Extraocular Movements: Extraocular movements intact.      Conjunctiva/sclera: Conjunctivae normal.      Pupils: Pupils are equal, round, and reactive to light.   Neck:      " Musculoskeletal: Normal range of motion and neck supple.   Cardiovascular:      Rate and Rhythm: Normal rate and regular rhythm.      Heart sounds: Normal heart sounds.   Pulmonary:      Effort: Pulmonary effort is normal.      Breath sounds: Wheezing present.   Musculoskeletal: Normal range of motion.   Skin:     General: Skin is warm and dry.      Capillary Refill: Capillary refill takes less than 2 seconds.   Neurological:      Mental Status: He is alert and oriented to person, place, and time.   Psychiatric:         Mood and Affect: Mood normal.         Behavior: Behavior normal.         Thought Content: Thought content normal.         Judgment: Judgment normal.                 Assessment/Plan:        1. Shortness of breath  2. History of asthma    Refilled prednisone  Advair discus  Continue albuterol  Referral given to pulmonary for follow-up

## 2020-10-23 ENCOUNTER — APPOINTMENT (OUTPATIENT)
Dept: RADIOLOGY | Facility: MEDICAL CENTER | Age: 55
DRG: 603 | End: 2020-10-23
Attending: EMERGENCY MEDICINE
Payer: COMMERCIAL

## 2020-10-23 ENCOUNTER — OFFICE VISIT (OUTPATIENT)
Dept: URGENT CARE | Facility: PHYSICIAN GROUP | Age: 55
End: 2020-10-23
Payer: COMMERCIAL

## 2020-10-23 ENCOUNTER — HOSPITAL ENCOUNTER (INPATIENT)
Facility: MEDICAL CENTER | Age: 55
LOS: 2 days | DRG: 603 | End: 2020-10-25
Attending: EMERGENCY MEDICINE | Admitting: INTERNAL MEDICINE
Payer: COMMERCIAL

## 2020-10-23 VITALS
DIASTOLIC BLOOD PRESSURE: 98 MMHG | HEIGHT: 68 IN | WEIGHT: 300 LBS | TEMPERATURE: 100.9 F | OXYGEN SATURATION: 95 % | BODY MASS INDEX: 45.47 KG/M2 | SYSTOLIC BLOOD PRESSURE: 150 MMHG | RESPIRATION RATE: 20 BRPM | HEART RATE: 99 BPM

## 2020-10-23 DIAGNOSIS — M79.662 PAIN OF LEFT CALF: ICD-10-CM

## 2020-10-23 DIAGNOSIS — L03.116 LEFT LEG CELLULITIS: ICD-10-CM

## 2020-10-23 DIAGNOSIS — R52 BODY ACHES: ICD-10-CM

## 2020-10-23 DIAGNOSIS — L03.116 CELLULITIS OF LEFT LOWER EXTREMITY: ICD-10-CM

## 2020-10-23 PROBLEM — L40.9 PSORIASIS: Status: ACTIVE | Noted: 2020-10-23

## 2020-10-23 LAB
ALBUMIN SERPL BCP-MCNC: 3.9 G/DL (ref 3.2–4.9)
ALBUMIN/GLOB SERPL: 1 G/DL
ALP SERPL-CCNC: 71 U/L (ref 30–99)
ALT SERPL-CCNC: 30 U/L (ref 2–50)
ANION GAP SERPL CALC-SCNC: 14 MMOL/L (ref 7–16)
AST SERPL-CCNC: 34 U/L (ref 12–45)
BASOPHILS # BLD AUTO: 0.3 % (ref 0–1.8)
BASOPHILS # BLD: 0.04 K/UL (ref 0–0.12)
BILIRUB SERPL-MCNC: 0.9 MG/DL (ref 0.1–1.5)
BUN SERPL-MCNC: 13 MG/DL (ref 8–22)
CALCIUM SERPL-MCNC: 9.3 MG/DL (ref 8.4–10.2)
CHLORIDE SERPL-SCNC: 100 MMOL/L (ref 96–112)
CO2 SERPL-SCNC: 20 MMOL/L (ref 20–33)
COVID ORDER STATUS COVID19: NORMAL
CREAT SERPL-MCNC: 0.99 MG/DL (ref 0.5–1.4)
EOSINOPHIL # BLD AUTO: 0.07 K/UL (ref 0–0.51)
EOSINOPHIL NFR BLD: 0.6 % (ref 0–6.9)
ERYTHROCYTE [DISTWIDTH] IN BLOOD BY AUTOMATED COUNT: 43.3 FL (ref 35.9–50)
GLOBULIN SER CALC-MCNC: 4 G/DL (ref 1.9–3.5)
GLUCOSE SERPL-MCNC: 111 MG/DL (ref 65–99)
HCT VFR BLD AUTO: 44.4 % (ref 42–52)
HGB BLD-MCNC: 15.2 G/DL (ref 14–18)
IMM GRANULOCYTES # BLD AUTO: 0.08 K/UL (ref 0–0.11)
IMM GRANULOCYTES NFR BLD AUTO: 0.6 % (ref 0–0.9)
LACTATE BLD-SCNC: 1.7 MMOL/L (ref 0.5–2)
LACTATE BLD-SCNC: 1.7 MMOL/L (ref 0.5–2)
LACTATE BLD-SCNC: 1.9 MMOL/L (ref 0.5–2)
LYMPHOCYTES # BLD AUTO: 1.29 K/UL (ref 1–4.8)
LYMPHOCYTES NFR BLD: 10.3 % (ref 22–41)
MCH RBC QN AUTO: 30.2 PG (ref 27–33)
MCHC RBC AUTO-ENTMCNC: 34.2 G/DL (ref 33.7–35.3)
MCV RBC AUTO: 88.1 FL (ref 81.4–97.8)
MONOCYTES # BLD AUTO: 1 K/UL (ref 0–0.85)
MONOCYTES NFR BLD AUTO: 7.9 % (ref 0–13.4)
NEUTROPHILS # BLD AUTO: 10.1 K/UL (ref 1.82–7.42)
NEUTROPHILS NFR BLD: 80.3 % (ref 44–72)
NRBC # BLD AUTO: 0 K/UL
NRBC BLD-RTO: 0 /100 WBC
PLATELET # BLD AUTO: 194 K/UL (ref 164–446)
PMV BLD AUTO: 10.5 FL (ref 9–12.9)
POTASSIUM SERPL-SCNC: 4 MMOL/L (ref 3.6–5.5)
PROT SERPL-MCNC: 7.9 G/DL (ref 6–8.2)
RBC # BLD AUTO: 5.04 M/UL (ref 4.7–6.1)
SODIUM SERPL-SCNC: 134 MMOL/L (ref 135–145)
WBC # BLD AUTO: 12.6 K/UL (ref 4.8–10.8)

## 2020-10-23 PROCEDURE — A9270 NON-COVERED ITEM OR SERVICE: HCPCS | Performed by: INTERNAL MEDICINE

## 2020-10-23 PROCEDURE — 96365 THER/PROPH/DIAG IV INF INIT: CPT

## 2020-10-23 PROCEDURE — 80053 COMPREHEN METABOLIC PANEL: CPT

## 2020-10-23 PROCEDURE — 700111 HCHG RX REV CODE 636 W/ 250 OVERRIDE (IP): Performed by: EMERGENCY MEDICINE

## 2020-10-23 PROCEDURE — 770006 HCHG ROOM/CARE - MED/SURG/GYN SEMI*

## 2020-10-23 PROCEDURE — 85025 COMPLETE CBC W/AUTO DIFF WBC: CPT

## 2020-10-23 PROCEDURE — 99221 1ST HOSP IP/OBS SF/LOW 40: CPT | Performed by: INTERNAL MEDICINE

## 2020-10-23 PROCEDURE — 99214 OFFICE O/P EST MOD 30 MIN: CPT | Performed by: PHYSICIAN ASSISTANT

## 2020-10-23 PROCEDURE — 94760 N-INVAS EAR/PLS OXIMETRY 1: CPT

## 2020-10-23 PROCEDURE — 36415 COLL VENOUS BLD VENIPUNCTURE: CPT

## 2020-10-23 PROCEDURE — 700102 HCHG RX REV CODE 250 W/ 637 OVERRIDE(OP): Performed by: INTERNAL MEDICINE

## 2020-10-23 PROCEDURE — 99285 EMERGENCY DEPT VISIT HI MDM: CPT

## 2020-10-23 PROCEDURE — 700105 HCHG RX REV CODE 258: Performed by: EMERGENCY MEDICINE

## 2020-10-23 PROCEDURE — 83605 ASSAY OF LACTIC ACID: CPT

## 2020-10-23 PROCEDURE — 96375 TX/PRO/DX INJ NEW DRUG ADDON: CPT

## 2020-10-23 PROCEDURE — U0003 INFECTIOUS AGENT DETECTION BY NUCLEIC ACID (DNA OR RNA); SEVERE ACUTE RESPIRATORY SYNDROME CORONAVIRUS 2 (SARS-COV-2) (CORONAVIRUS DISEASE [COVID-19]), AMPLIFIED PROBE TECHNIQUE, MAKING USE OF HIGH THROUGHPUT TECHNOLOGIES AS DESCRIBED BY CMS-2020-01-R: HCPCS

## 2020-10-23 PROCEDURE — 93971 EXTREMITY STUDY: CPT | Mod: LT

## 2020-10-23 PROCEDURE — C9803 HOPD COVID-19 SPEC COLLECT: HCPCS | Performed by: INTERNAL MEDICINE

## 2020-10-23 PROCEDURE — 87040 BLOOD CULTURE FOR BACTERIA: CPT

## 2020-10-23 RX ORDER — OXYCODONE HYDROCHLORIDE 10 MG/1
5 TABLET ORAL
Status: DISCONTINUED | OUTPATIENT
Start: 2020-10-23 | End: 2020-10-24

## 2020-10-23 RX ORDER — OMEPRAZOLE 20 MG/1
20 CAPSULE, DELAYED RELEASE ORAL DAILY
Status: DISCONTINUED | OUTPATIENT
Start: 2020-10-24 | End: 2020-10-25 | Stop reason: HOSPADM

## 2020-10-23 RX ORDER — AMLODIPINE BESYLATE 5 MG/1
5 TABLET ORAL
Status: DISCONTINUED | OUTPATIENT
Start: 2020-10-24 | End: 2020-10-25 | Stop reason: HOSPADM

## 2020-10-23 RX ORDER — AMOXICILLIN 250 MG
2 CAPSULE ORAL 2 TIMES DAILY
Status: DISCONTINUED | OUTPATIENT
Start: 2020-10-23 | End: 2020-10-24

## 2020-10-23 RX ORDER — LABETALOL HYDROCHLORIDE 5 MG/ML
10 INJECTION, SOLUTION INTRAVENOUS EVERY 4 HOURS PRN
Status: DISCONTINUED | OUTPATIENT
Start: 2020-10-23 | End: 2020-10-24

## 2020-10-23 RX ORDER — ALBUTEROL SULFATE 90 UG/1
1-2 AEROSOL, METERED RESPIRATORY (INHALATION)
Status: DISCONTINUED | OUTPATIENT
Start: 2020-10-23 | End: 2020-10-25 | Stop reason: HOSPADM

## 2020-10-23 RX ORDER — FENOFIBRATE 134 MG/1
134 CAPSULE ORAL EVERY EVENING
Status: DISCONTINUED | OUTPATIENT
Start: 2020-10-23 | End: 2020-10-25 | Stop reason: HOSPADM

## 2020-10-23 RX ORDER — ONDANSETRON 4 MG/1
4 TABLET, ORALLY DISINTEGRATING ORAL EVERY 4 HOURS PRN
Status: DISCONTINUED | OUTPATIENT
Start: 2020-10-23 | End: 2020-10-25 | Stop reason: HOSPADM

## 2020-10-23 RX ORDER — LISINOPRIL 20 MG/1
20 TABLET ORAL
Status: DISCONTINUED | OUTPATIENT
Start: 2020-10-24 | End: 2020-10-25 | Stop reason: HOSPADM

## 2020-10-23 RX ORDER — MORPHINE SULFATE 4 MG/ML
4 INJECTION, SOLUTION INTRAMUSCULAR; INTRAVENOUS ONCE
Status: COMPLETED | OUTPATIENT
Start: 2020-10-23 | End: 2020-10-23

## 2020-10-23 RX ORDER — OXYCODONE HYDROCHLORIDE 5 MG/1
2.5 TABLET ORAL
Status: DISCONTINUED | OUTPATIENT
Start: 2020-10-23 | End: 2020-10-24

## 2020-10-23 RX ORDER — ONDANSETRON 2 MG/ML
4 INJECTION INTRAMUSCULAR; INTRAVENOUS ONCE
Status: COMPLETED | OUTPATIENT
Start: 2020-10-23 | End: 2020-10-23

## 2020-10-23 RX ORDER — BISACODYL 10 MG
10 SUPPOSITORY, RECTAL RECTAL
Status: DISCONTINUED | OUTPATIENT
Start: 2020-10-23 | End: 2020-10-25 | Stop reason: HOSPADM

## 2020-10-23 RX ORDER — BUDESONIDE AND FORMOTEROL FUMARATE DIHYDRATE 160; 4.5 UG/1; UG/1
2 AEROSOL RESPIRATORY (INHALATION) 2 TIMES DAILY
Status: DISCONTINUED | OUTPATIENT
Start: 2020-10-23 | End: 2020-10-25 | Stop reason: HOSPADM

## 2020-10-23 RX ORDER — SODIUM CHLORIDE, SODIUM LACTATE, POTASSIUM CHLORIDE, AND CALCIUM CHLORIDE .6; .31; .03; .02 G/100ML; G/100ML; G/100ML; G/100ML
1000 INJECTION, SOLUTION INTRAVENOUS ONCE
Status: COMPLETED | OUTPATIENT
Start: 2020-10-23 | End: 2020-10-23

## 2020-10-23 RX ORDER — ONDANSETRON 2 MG/ML
4 INJECTION INTRAMUSCULAR; INTRAVENOUS EVERY 4 HOURS PRN
Status: DISCONTINUED | OUTPATIENT
Start: 2020-10-23 | End: 2020-10-25 | Stop reason: HOSPADM

## 2020-10-23 RX ORDER — PROCHLORPERAZINE EDISYLATE 5 MG/ML
5-10 INJECTION INTRAMUSCULAR; INTRAVENOUS EVERY 4 HOURS PRN
Status: DISCONTINUED | OUTPATIENT
Start: 2020-10-23 | End: 2020-10-25 | Stop reason: HOSPADM

## 2020-10-23 RX ORDER — POLYETHYLENE GLYCOL 3350 17 G/17G
1 POWDER, FOR SOLUTION ORAL
Status: DISCONTINUED | OUTPATIENT
Start: 2020-10-23 | End: 2020-10-25 | Stop reason: HOSPADM

## 2020-10-23 RX ORDER — PROMETHAZINE HYDROCHLORIDE 25 MG/1
12.5-25 TABLET ORAL EVERY 4 HOURS PRN
Status: DISCONTINUED | OUTPATIENT
Start: 2020-10-23 | End: 2020-10-25 | Stop reason: HOSPADM

## 2020-10-23 RX ORDER — ZOLPIDEM TARTRATE 5 MG/1
10 TABLET ORAL NIGHTLY PRN
Status: DISCONTINUED | OUTPATIENT
Start: 2020-10-23 | End: 2020-10-25 | Stop reason: HOSPADM

## 2020-10-23 RX ORDER — PROMETHAZINE HYDROCHLORIDE 25 MG/1
12.5-25 SUPPOSITORY RECTAL EVERY 4 HOURS PRN
Status: DISCONTINUED | OUTPATIENT
Start: 2020-10-23 | End: 2020-10-25 | Stop reason: HOSPADM

## 2020-10-23 RX ORDER — ACETAMINOPHEN 325 MG/1
650 TABLET ORAL EVERY 6 HOURS PRN
Status: DISCONTINUED | OUTPATIENT
Start: 2020-10-23 | End: 2020-10-25 | Stop reason: HOSPADM

## 2020-10-23 RX ORDER — MORPHINE SULFATE 4 MG/ML
2 INJECTION, SOLUTION INTRAMUSCULAR; INTRAVENOUS
Status: DISCONTINUED | OUTPATIENT
Start: 2020-10-23 | End: 2020-10-24

## 2020-10-23 RX ADMIN — ZOLPIDEM TARTRATE 10 MG: 5 TABLET ORAL at 20:42

## 2020-10-23 RX ADMIN — SODIUM CHLORIDE, POTASSIUM CHLORIDE, SODIUM LACTATE AND CALCIUM CHLORIDE 1000 ML: 600; 310; 30; 20 INJECTION, SOLUTION INTRAVENOUS at 16:31

## 2020-10-23 RX ADMIN — ONDANSETRON 4 MG: 2 INJECTION INTRAMUSCULAR; INTRAVENOUS at 15:54

## 2020-10-23 RX ADMIN — AMPICILLIN SODIUM AND SULBACTAM SODIUM 3 G: 2; 1 INJECTION, POWDER, FOR SOLUTION INTRAMUSCULAR; INTRAVENOUS at 15:57

## 2020-10-23 RX ADMIN — OXYCODONE HYDROCHLORIDE 5 MG: 10 TABLET ORAL at 19:04

## 2020-10-23 RX ADMIN — MORPHINE SULFATE 4 MG: 4 INJECTION INTRAVENOUS at 15:54

## 2020-10-23 RX ADMIN — VANCOMYCIN HYDROCHLORIDE 3 G: 500 INJECTION, POWDER, LYOPHILIZED, FOR SOLUTION INTRAVENOUS at 18:37

## 2020-10-23 RX ADMIN — FENOFIBRATE 134 MG: 134 CAPSULE ORAL at 20:24

## 2020-10-23 ASSESSMENT — ENCOUNTER SYMPTOMS
NERVOUS/ANXIOUS: 0
INSOMNIA: 0
NAUSEA: 1
SENSORY CHANGE: 0
SPEECH CHANGE: 0
FEVER: 1
DIARRHEA: 0
BLURRED VISION: 0
ABDOMINAL PAIN: 0
WEAKNESS: 0
COUGH: 0
PHOTOPHOBIA: 0
SHORTNESS OF BREATH: 0
DEPRESSION: 0
HEARTBURN: 0
CLAUDICATION: 0
VOMITING: 1
SORE THROAT: 0
CHILLS: 0
HEADACHES: 0
DIZZINESS: 0
MYALGIAS: 1
CONSTIPATION: 0

## 2020-10-23 ASSESSMENT — PATIENT HEALTH QUESTIONNAIRE - PHQ9
1. LITTLE INTEREST OR PLEASURE IN DOING THINGS: NOT AT ALL
2. FEELING DOWN, DEPRESSED, IRRITABLE, OR HOPELESS: NOT AT ALL
SUM OF ALL RESPONSES TO PHQ9 QUESTIONS 1 AND 2: 0

## 2020-10-23 ASSESSMENT — COGNITIVE AND FUNCTIONAL STATUS - GENERAL
MOVING FROM LYING ON BACK TO SITTING ON SIDE OF FLAT BED: A LITTLE
WALKING IN HOSPITAL ROOM: A LITTLE
DAILY ACTIVITIY SCORE: 22
SUGGESTED CMS G CODE MODIFIER DAILY ACTIVITY: CJ
MOBILITY SCORE: 19
STANDING UP FROM CHAIR USING ARMS: A LITTLE
CLIMB 3 TO 5 STEPS WITH RAILING: A LITTLE
DRESSING REGULAR LOWER BODY CLOTHING: A LITTLE
MOVING TO AND FROM BED TO CHAIR: A LITTLE
SUGGESTED CMS G CODE MODIFIER MOBILITY: CK
HELP NEEDED FOR BATHING: A LITTLE

## 2020-10-23 ASSESSMENT — PAIN DESCRIPTION - PAIN TYPE
TYPE: ACUTE PAIN
TYPE: ACUTE PAIN

## 2020-10-23 ASSESSMENT — LIFESTYLE VARIABLES
EVER HAD A DRINK FIRST THING IN THE MORNING TO STEADY YOUR NERVES TO GET RID OF A HANGOVER: NO
EVER_SMOKED: NEVER
CONSUMPTION TOTAL: NEGATIVE
AVERAGE NUMBER OF DAYS PER WEEK YOU HAVE A DRINK CONTAINING ALCOHOL: 2
TOTAL SCORE: 0
HAVE YOU EVER FELT YOU SHOULD CUT DOWN ON YOUR DRINKING: NO
ON A TYPICAL DAY WHEN YOU DRINK ALCOHOL HOW MANY DRINKS DO YOU HAVE: 2
TOTAL SCORE: 0
HAVE PEOPLE ANNOYED YOU BY CRITICIZING YOUR DRINKING: NO
ALCOHOL_USE: YES
EVER FELT BAD OR GUILTY ABOUT YOUR DRINKING: NO
HOW MANY TIMES IN THE PAST YEAR HAVE YOU HAD 5 OR MORE DRINKS IN A DAY: 0
TOTAL SCORE: 0

## 2020-10-23 ASSESSMENT — COPD QUESTIONNAIRES
DURING THE PAST 4 WEEKS HOW MUCH DID YOU FEEL SHORT OF BREATH: SOME OF THE TIME
DO YOU EVER COUGH UP ANY MUCUS OR PHLEGM?: NO/ONLY WITH OCCASIONAL COLDS OR INFECTIONS
HAVE YOU SMOKED AT LEAST 100 CIGARETTES IN YOUR ENTIRE LIFE: NO/DON'T KNOW
COPD SCREENING SCORE: 2

## 2020-10-23 ASSESSMENT — FIBROSIS 4 INDEX
FIB4 SCORE: 1.7
FIB4 SCORE: 1.12
FIB4 SCORE: 1.12

## 2020-10-23 NOTE — ED TRIAGE NOTES
L leg pain and fever that started Wed. 1 episode of emesis. Was seen at [facility] and unknown dx. Yesterday redness showed up in [facility] and was seen by urgent care in [place] this morning and sent here for cellulitis.    Pt and visitor deny any other covid symptoms or known exposure.

## 2020-10-23 NOTE — ASSESSMENT & PLAN NOTE
Pruritus scales on dorsum and sides of feet  Reports wearing steel-toed boots  Likely the source of cellulitis  Topicals deferred due to cellulitis, griseofulvin deferred while utilizing PRN ibuprofen  Follow up with podiatrist after discharge

## 2020-10-23 NOTE — ASSESSMENT & PLAN NOTE
WBC 12.6, no other SIRS criteria  Nonpurulent and no history of MRSA  Vancomycin transitioned to ancef IV  If ongoing improvement in borders, will transition to keflex tomorrow  BCx on admission NGTD  I personally reviewed the microbiology data  LLE US negative for DVT  I personally reviewed the LLE US report  Ibuprofen PRN and oxycodone PRN for pain  Repeat CBC tomorrow

## 2020-10-23 NOTE — PROGRESS NOTES
Chief Complaint   Patient presents with   • Leg Pain     poss leg infection (L) lower leg        HISTORY OF PRESENT ILLNESS: Patient is a 55 y.o. male who presents today for the following:    Patient comes in for evaluation of left leg pain that started 5 days ago.  It started out as left calf pain and pain in the distal medial left upper leg.  2 days ago he started having vomiting and was seen in the emergency department at [facility].  In the ED he had 3 L of fluid and was tested for Covid.  They celine blood cultures and was told him his white count was elevated.  Yesterday afternoon patient noticed that his left lower leg was red and continue to have worsening pain and swelling.  He continues to have body aches, diffuse joint pain, and nausea.  He does have nausea medicine at home.    Patient Active Problem List    Diagnosis Date Noted   • Mild persistent asthma with acute exacerbation 05/21/2017   • BMI 45.0-49.9, adult ([facility]) 09/22/2015   • Bronchitis with bronchospasm 08/25/2015   • Knee pain 12/23/2014   • Asthma in adult 04/24/2014   • Elevated blood sugar 01/23/2014   • Leg pain, left 10/10/2013   • Vitamin d deficiency 01/10/2012   • GERD (gastroesophageal reflux disease) 06/15/2011   • Eczema 02/03/2011   • ED (erectile dysfunction) 02/03/2011   • Environmental allergies 02/03/2011   • Insomnia 03/16/2010   • Hypertension    • Obesity    • Osteoarthritis    • Obstructive sleep apnea    • Hypercholesteremia    • Cervical spine degeneration        Allergies:Patient has no known allergies.    Current Outpatient Medications Ordered in Epic   Medication Sig Dispense Refill   • fluticasone-salmeterol (ADVAIR) 100-50 MCG/DOSE AEROSOL POWDER, BREATH ACTIVATED Inhale 1 Puff by mouth every 12 hours. 1 Each 0   • albuterol (ACCUNEB) 1.25 MG/3ML nebulizer solution      • esomeprazole (NEXIUM) 20 MG capsule Take 20 mg by mouth.     • fenofibrate (TRICOR) 145 MG Tab Take 145 mg by mouth.     •  albuterol 108 (90 Base) MCG/ACT Aero Soln inhalation aerosol Inhale 1-2 Puffs by mouth.     • amLODIPine (NORVASC) 5 MG Tab      • budesonide-formoterol (SYMBICORT) 160-4.5 MCG/ACT Aerosol Inhale 2 Puffs by mouth.     • fluticasone (FLONASE) 50 MCG/ACT nasal spray Spray 1 Spray in nose.     • tizanidine (ZANAFLEX) 4 MG Tab Take 4 mg by mouth.     • lisinopril (PRINIVIL) 20 MG Tab Take 20 mg by mouth.     • fexofenadine (ALLEGRA) 180 MG tablet Take 180 mg by mouth.     • fenofibrate (TRICOR) 145 MG Tab Take 145 mg by mouth every evening.     • esomeprazole (NEXIUM) 40 MG delayed-release capsule Take 40 mg by mouth every evening.     • ibuprofen (MOTRIN) 200 MG Tab Take 800 mg by mouth every 6 hours as needed for Mild Pain.     • zolpidem (AMBIEN) 10 MG TABS Take 1 Tab by mouth at bedtime as needed for Sleep. 90 Each 3     No current Epic-ordered facility-administered medications on file.        Past Medical History:   Diagnosis Date   • Allergy    • ASTHMA    • Back pain    • BPH (benign prostatic hypertrophy)    • Breast mass     right side between 11 and 12 o'clock at nipple   • Cervical spine degeneration    • Chest pain    • Difficulty breathing    • Earache    • ED (erectile dysfunction)    • High cholesterol    • Hypercholesteremia    • Hypertension    • Obesity    • Osteoarthritis    • Prediabetes    • Right arm pain    • Ringing in ears    • Sleep apnea     on CPAP   • Sweat, sweating, excessive    • Wears glasses        Social History     Tobacco Use   • Smoking status: Former Smoker     Packs/day: 0.50     Years: 0.50     Pack years: 0.25     Types: Cigarettes     Quit date: 1982     Years since quittin.7   • Smokeless tobacco: Never Used   Substance Use Topics   • Alcohol use: Yes     Comment: 2 per week   • Drug use: No     Comment: hx of ephedirn for weight loss over 2 years ago       Family Status   Relation Name Status   • Mo  Other        pt is adopted   • Fa  Other        pt is adopted   •  "Jan  Alive   • Jan  Alive   History reviewed. No pertinent family history.    Review of Systems:   Constitutional ROS: No unexpected change in weight, No weakness, No fatigue  Pulmonary ROS: No chronic cough, sputum, or hemoptysis, No dyspnea on exertion, No wheezing  Cardiovascular ROS: No diaphoresis, No edema, No palpitations  Musculoskeletal/Extremities ROS: Left leg pain, swelling, redness  Hematologic/Lymphatic ROS: No chills, No night sweats, No weight loss  Skin/Integumentary ROS: No edema, No evidence of rash, No itching      Exam:  /98   Pulse 99   Temp (!) 38.3 °C (100.9 °F)   Resp 20   Ht 1.727 m (5' 8\")   Wt (!) 136.1 kg (300 lb)   SpO2 95%   General: Well developed, well nourished. No distress.    HENT: Head is grossly normal.  Pulmonary: Unlabored respiratory effort.   Neurologic: Grossly nonfocal. No facial asymmetry noted.  Musculoskeletal: Diffuse tenderness, erythema, and swelling of the left lower leg.  Tenderness is worse on the posterior aspect.  Tenderness extends to the medial left upper leg with minimal erythema but is markedly erythematous at the groin.  Patient has a difficult time walking due to pain.  Psych: Normal mood. Alert and oriented to person, place and time.    Assessment/Plan:  I am concerned about the fever, worsening erythema, specifically that it has extended to the groin.  Patient had an elevated white count in the ED 2 days ago and stat labs are not available at this location.  Am also concerned about the swelling and pain and the ultrasound may be needed but is unavailable at this location.  Discussed this with the patient and his wife who agreed to be evaluated at the emergency department.  Spoke with Dr. Pascual at the [facility] ED for [facility].  1. Cellulitis of left lower extremity     2. Pain of left calf     3. Body aches         "

## 2020-10-23 NOTE — ED PROVIDER NOTES
ED Provider Note  CHIEF COMPLAINT  Chief Complaint   Patient presents with   • Leg Swelling       HPI  French Hurd is a 55 y.o. male who presents with left leg pain and swelling.  Symptoms started 4 to 5 days ago.  He states his legs felt crampy.  He went to [facility] where he states they evaluated him for Covid.  He states at that time he did not have any redness to his leg.  He states he was sent home and then yesterday he developed redness to his leg.  Today he noticed the redness up into his groin and therefore went to urgent care.  Patient has had a few episodes of nausea and vomiting.  He also complains of fevers and chills.  He has no history of DVT in the past.  Denies any numbness or tingling in his foot.  He does have pain with any kind of ambulation.  [place] [facility] states he was told he had an elevated white count.  He was also given multiple liters of fluid.  He said they did draw blood cultures but is not know the results.    REVIEW OF SYSTEMS  See HPI for further details.  Positive for left leg pain swelling and redness and nausea and vomiting.  Denies chest pain, cough, difficulty breathing, palpitations, numbness or tingling or weakness to the leg, rash, fatigue, hyperglycemia.  All other systems are negative.     PAST MEDICAL HISTORY  Past Medical History:   Diagnosis Date   • Allergy    • ASTHMA    • Back pain    • BPH (benign prostatic hypertrophy)    • Breast mass     right side between 11 and 12 o'clock at nipple   • Cervical spine degeneration    • Chest pain    • Difficulty breathing    • Earache    • ED (erectile dysfunction)    • High cholesterol    • Hypercholesteremia    • Hypertension    • Obesity    • Osteoarthritis    • Prediabetes    • Right arm pain    • Ringing in ears    • Sleep apnea     on CPAP   • Sweat, sweating, excessive    • Wears glasses        FAMILY HISTORY  [unfilled]    SOCIAL HISTORY  Social History     Socioeconomic History    • Marital status:      Spouse name: Not on file   • Number of children: Not on file   • Years of education: Not on file   • Highest education level: Not on file   Occupational History     Employer: OTHER     Comment: pt is adopted and does not know family history   Social Needs   • Financial resource strain: Not on file   • Food insecurity     Worry: Not on file     Inability: Not on file   • Transportation needs     Medical: Not on file     Non-medical: Not on file   Tobacco Use   • Smoking status: Former Smoker     Packs/day: 0.50     Years: 0.50     Pack years: 0.25     Types: Cigarettes     Quit date: 1982     Years since quittin.7   • Smokeless tobacco: Never Used   Substance and Sexual Activity   • Alcohol use: Yes     Comment: 2 per week   • Drug use: No     Comment: hx of ephedirn for weight loss over 2 years ago   • Sexual activity: Yes     Partners: Female   Lifestyle   • Physical activity     Days per week: Not on file     Minutes per session: Not on file   • Stress: Not on file   Relationships   • Social connections     Talks on phone: Not on file     Gets together: Not on file     Attends Mosque service: Not on file     Active member of club or organization: Not on file     Attends meetings of clubs or organizations: Not on file     Relationship status: Not on file   • Intimate partner violence     Fear of current or ex partner: Not on file     Emotionally abused: Not on file     Physically abused: Not on file     Forced sexual activity: Not on file   Other Topics Concern   • Not on file   Social History Narrative   • Not on file       SURGICAL HISTORY  Past Surgical History:   Procedure Laterality Date   • COLONOSCOPY - ENDO  2019    Procedure: COLONOSCOPY - AVERAGE RISK SCREENING;  Surgeon: Gopal Dinh M.D.;  Location: SURGERY [facility];  Service: Gastroenterology   • HARDWARE REMOVAL ORTHO Right 2017    right hand   • CERVICAL DISK AND FUSION ANTERIOR  2017     "C5,6,7   • APPENDECTOMY  2001   • OTHER ORTHOPEDIC SURGERY Left 1993    L wrist, R hand-4th and 5th metacarpal   • LIGAMENT RECONSTRUCTION Left 1991    ulna   • ANKLE ORIF Right 1987   • HARDWARE REMOVAL ORTHO Right 1987    right ankle   • TONSILLECTOMY  1983   • VASECTOMY      x2       CURRENT MEDICATIONS   Home Medications     Reviewed by Lashae Lees R.N. (Registered Nurse) on 10/23/20 at 1506  Med List Status: Partial   Medication Last Dose Status   albuterol (ACCUNEB) 1.25 MG/3ML nebulizer solution  Active   albuterol 108 (90 Base) MCG/ACT Aero Soln inhalation aerosol  Active   amLODIPine (NORVASC) 5 MG Tab  Active   budesonide-formoterol (SYMBICORT) 160-4.5 MCG/ACT Aerosol  Active   esomeprazole (NEXIUM) 20 MG capsule  Active   esomeprazole (NEXIUM) 40 MG delayed-release capsule  Active   fenofibrate (TRICOR) 145 MG Tab  Active   fenofibrate (TRICOR) 145 MG Tab  Active   fexofenadine (ALLEGRA) 180 MG tablet  Active   fluticasone (FLONASE) 50 MCG/ACT nasal spray  Active   fluticasone-salmeterol (ADVAIR) 100-50 MCG/DOSE AEROSOL POWDER, BREATH ACTIVATED  Active   ibuprofen (MOTRIN) 200 MG Tab  Active   lisinopril (PRINIVIL) 20 MG Tab  Active   tizanidine (ZANAFLEX) 4 MG Tab  Active   zolpidem (AMBIEN) 10 MG TABS  Active                ALLERGIES  No Known Allergies    PHYSICAL EXAM  VITAL SIGNS: /83   Pulse 83   Temp 36.3 °C (97.4 °F) Comment: 800 mg ibu 1030  Resp 20   Ht 1.727 m (5' 8\")   Wt (!) 136.1 kg (300 lb)   SpO2 96%   BMI 45.61 kg/m²       Constitutional:  Well developed, No acute distress, Non-toxic appearance.   HENT: Normocephalic, Atraumatic, Bilateral external ears normal, Oropharynx moist  Eyes: PERRL, EOMI, Conjunctiva normal  Neck: Normal range of motion, No tenderness, Supple   Lymphatic: left groin lymphadenopathy noted.   Cardiovascular: Normal heart rate, Normal rhythm  Thorax & Lungs: Normal breath sounds, No respiratory distress,    Abdomen: Benign abdominal exam, no " guarding no rebound, no tenderness, no distention  Skin: Warm, Dry, No erythema, there is erythema to the left lower leg but has some discoloration as well.  No open wounds.  There is no areas of fluctuance.  There is also an area of erythema to the left groin area.  Back: No tenderness, No CVA tenderness.   Extremities: Intact distal pulses, swelling to the left calf with tenderness to palpation.  +2 DP pulse.  Intact sensation to light touch.  Able to wiggle toes.  No pain out of proportion.  Soft calf compartments.  Neurologic: Alert & oriented x 3, Normal motor function, Normal sensory function, No focal deficits noted.   Psychiatric: appropriate      Labs  Results for orders placed or performed during the hospital encounter of 10/23/20   CBC WITH DIFFERENTIAL   Result Value Ref Range    WBC 12.6 (H) 4.8 - 10.8 K/uL    RBC 5.04 4.70 - 6.10 M/uL    Hemoglobin 15.2 14.0 - 18.0 g/dL    Hematocrit 44.4 42.0 - 52.0 %    MCV 88.1 81.4 - 97.8 fL    MCH 30.2 27.0 - 33.0 pg    MCHC 34.2 33.7 - 35.3 g/dL    RDW 43.3 35.9 - 50.0 fL    Platelet Count 194 164 - 446 K/uL    MPV 10.5 9.0 - 12.9 fL    Neutrophils-Polys 80.30 (H) 44.00 - 72.00 %    Lymphocytes 10.30 (L) 22.00 - 41.00 %    Monocytes 7.90 0.00 - 13.40 %    Eosinophils 0.60 0.00 - 6.90 %    Basophils 0.30 0.00 - 1.80 %    Immature Granulocytes 0.60 0.00 - 0.90 %    Nucleated RBC 0.00 /100 WBC    Neutrophils (Absolute) 10.10 (H) 1.82 - 7.42 K/uL    Lymphs (Absolute) 1.29 1.00 - 4.80 K/uL    Monos (Absolute) 1.00 (H) 0.00 - 0.85 K/uL    Eos (Absolute) 0.07 0.00 - 0.51 K/uL    Baso (Absolute) 0.04 0.00 - 0.12 K/uL    Immature Granulocytes (abs) 0.08 0.00 - 0.11 K/uL    NRBC (Absolute) 0.00 K/uL   COMP METABOLIC PANEL   Result Value Ref Range    Sodium 134 (L) 135 - 145 mmol/L    Potassium 4.0 3.6 - 5.5 mmol/L    Chloride 100 96 - 112 mmol/L    Co2 20 20 - 33 mmol/L    Anion Gap 14.0 7.0 - 16.0    Glucose 111 (H) 65 - 99 mg/dL    Bun 13 8 - 22 mg/dL    Creatinine 0.99  0.50 - 1.40 mg/dL    Calcium 9.3 8.4 - 10.2 mg/dL    AST(SGOT) 34 12 - 45 U/L    ALT(SGPT) 30 2 - 50 U/L    Alkaline Phosphatase 71 30 - 99 U/L    Total Bilirubin 0.9 0.1 - 1.5 mg/dL    Albumin 3.9 3.2 - 4.9 g/dL    Total Protein 7.9 6.0 - 8.2 g/dL    Globulin 4.0 (H) 1.9 - 3.5 g/dL    A-G Ratio 1.0 g/dL   LACTIC ACID   Result Value Ref Range    Lactic Acid 1.7 0.5 - 2.0 mmol/L   ESTIMATED GFR   Result Value Ref Range    GFR If African American >60 >60 mL/min/1.73 m 2    GFR If Non African American >60 >60 mL/min/1.73 m 2       RADIOLOGY/PROCEDURES  US-EXTREMITY VENOUS LOWER UNILAT LEFT         No evidence of DVT but study was limited.    COURSE & MEDICAL DECISION MAKING  Pertinent Labs & Imaging studies reviewed. (See chart for details)  Patient presents emergency department for evaluation of possible leg cellulitis.  Patient has been sick for the last few days and was initially seen at [facility].  At that time he did not have any redness to his leg.  In the last 2 days he had increasing redness that now extends up into the groin.  Patient was febrile at the urgent care prior to transfer here.  Still complains of nausea vomiting as well as body aches.  Will obtain a ultrasound to rule out DVT.  We will also evaluate for possible sepsis.    Laboratory studies have returned and show a lactic acid of 1.7.  Patient has a white count of 12.6.  There is no evidence of bandemia.  There is no evidence of significant electrolyte abnormalities.  Glucose is 111.  Ultrasound shows no evidence of DVT.  Due to the extensive nature of the patient's erythema patient will be admitted to the hospital for antibiotic treatment of his cellulitis.    Discussed case with the hospitalist will see the patient.  FINAL IMPRESSION     1. Left leg cellulitis            Electronically signed by: Astrid Pascual M.D., 10/23/2020 3:24 PM

## 2020-10-23 NOTE — H&P
Hospital Medicine History & Physical Note    Date of Service  10/23/2020    Primary Care Physician  Jessi Salvador D.O.    Consultants  none    Code Status  Full Code    Chief Complaint  Chief Complaint   Patient presents with   • Leg Swelling       History of Presenting Illness  55 y.o. male who presented 10/23/2020 with left lower extremity pain for the past 3 days with onset of swelling and redness consistent with cellulitis.  He had been at [facility] the day before his rash appeared complaining of body aches, mild shortness of breath and fevers/chills and was screened for covid which was not positive.  The next day the cellulitic rash erupted.  He went to urgent care today and because of the possible blood clot and cellulitis was sent to the ED for follow up.  Today he also had onset nausea and emesis.  He has been dealing with eczema for year and wears steel-toed shoes for his job so has constant scabs on his feet.  He will be admitted to the hospital for IV antibiotics and monitoring for improvement to transition to oral regimen on discharge.  LE US completed in the ED, results pending.       Review of Systems  Review of Systems   Constitutional: Positive for fever. Negative for chills.   HENT: Negative for congestion and sore throat.    Eyes: Negative for blurred vision and photophobia.   Respiratory: Negative for cough and shortness of breath.    Cardiovascular: Negative for chest pain, claudication and leg swelling.   Gastrointestinal: Positive for nausea and vomiting. Negative for abdominal pain, constipation, diarrhea and heartburn.   Genitourinary: Negative for dysuria and hematuria.   Musculoskeletal: Positive for myalgias (left lower leg pain, right lateral foot pain from scrape.). Negative for joint pain.   Skin: Negative for itching and rash.   Neurological: Negative for dizziness, sensory change, speech change, weakness and headaches.   Psychiatric/Behavioral: Negative for depression. The patient is  not nervous/anxious and does not have insomnia.        Past Medical History   has a past medical history of Allergy, ASTHMA, Back pain, BPH (benign prostatic hypertrophy), Breast mass, Cervical spine degeneration, Chest pain, Difficulty breathing, Earache, ED (erectile dysfunction), High cholesterol, Hypercholesteremia, Hypertension, Obesity, Osteoarthritis, Prediabetes, Right arm pain, Ringing in ears, Sleep apnea, Sweat, sweating, excessive, and Wears glasses.    Surgical History   has a past surgical history that includes tonsillectomy (1983); vasectomy; other orthopedic surgery (Left, 1993); hardware removal ortho (Right, 2017); ligament reconstruction (Left, 1991); appendectomy (2001); cervical disk and fusion anterior (2017); ankle orif (Right, 1987); hardware removal ortho (Right, 1987); and colonoscopy - endo (8/9/2019).     Family History  family history is not on file.     Social History   reports that he quit smoking about 38 years ago. His smoking use included cigarettes. He has a 0.25 pack-year smoking history. He has never used smokeless tobacco. He reports current alcohol use. He reports that he does not use drugs.    Allergies  No Known Allergies    Medications  Prior to Admission Medications   Prescriptions Last Dose Informant Patient Reported? Taking?   albuterol 108 (90 Base) MCG/ACT Aero Soln inhalation aerosol 10/19/2020 at Unknown time Patient Yes No   Sig: Inhale 2 Puffs by mouth 1 time daily as needed.   amLODIPine (NORVASC) 5 MG Tab 10/19/2020 at afternoon Patient Yes No   Sig: Take 5 mg by mouth every day.   esomeprazole (NEXIUM) 20 MG capsule 10/19/2020 at afternoon Patient Yes No   Sig: Take 20 mg by mouth every day.   fenofibrate (TRICOR) 145 MG Tab 10/19/2020 at afternoon Patient Yes No   Sig: Take 145 mg by mouth every evening.   fexofenadine (ALLEGRA) 180 MG tablet 10/22/2020 at afternoon Patient Yes No   Sig: Take 180 mg by mouth 1 time daily as needed.   fluticasone (FLONASE) 50  MCG/ACT nasal spray 10/19/2020 at Unknown time Patient Yes No   Sig: Spray 1 Spray in nose.   fluticasone-salmeterol (ADVAIR) 100-50 MCG/DOSE AEROSOL POWDER, BREATH ACTIVATED 10/16/2020 at Unknown time Patient No No   Sig: Inhale 1 Puff by mouth every 12 hours.   ibuprofen (MOTRIN) 200 MG Tab 10/23/2020 at 1030 Patient Yes No   Sig: Take 800 mg by mouth 1 time daily as needed for Mild Pain.   lisinopril (PRINIVIL) 20 MG Tab 10/22/2020 at afternoon Patient Yes No   Sig: Take 20 mg by mouth every evening.   tizanidine (ZANAFLEX) 4 MG Tab 10/23/2020 at 0100 Patient Yes No   Sig: Take 4 mg by mouth at bedtime as needed.   zolpidem (AMBIEN) 10 MG TABS 10/17/2020 at pm Patient No No   Sig: Take 1 Tab by mouth at bedtime as needed for Sleep.      Facility-Administered Medications: None       Physical Exam  Temp:  [36.3 °C (97.4 °F)] 36.3 °C (97.4 °F)  Pulse:  [83] 83  Resp:  [20] 20  BP: (146)/(83) 146/83  SpO2:  [96 %] 96 %    Physical Exam  Vitals signs and nursing note reviewed.   Constitutional:       General: He is not in acute distress.     Appearance: Normal appearance.   HENT:      Head: Normocephalic and atraumatic.   Eyes:      General: No scleral icterus.     Extraocular Movements: Extraocular movements intact.   Neck:      Musculoskeletal: Normal range of motion and neck supple.   Cardiovascular:      Rate and Rhythm: Normal rate and regular rhythm.      Pulses: Normal pulses.      Heart sounds: Normal heart sounds. No murmur.   Pulmonary:      Effort: Pulmonary effort is normal. No respiratory distress.      Breath sounds: Normal breath sounds. No wheezing, rhonchi or rales.   Abdominal:      General: Abdomen is flat. Bowel sounds are normal. There is no distension.      Palpations: Abdomen is soft.      Tenderness: There is no rebound.   Musculoskeletal:         General: No swelling or tenderness.   Lymphadenopathy:      Cervical: No cervical adenopathy.   Skin:     Coloration: Skin is not jaundiced.       Findings: Rash (left cellulits rash, ankle to below knee.) present. No erythema.      Comments: Right lateral foot scrape.     Neurological:      General: No focal deficit present.      Mental Status: He is alert and oriented to person, place, and time. Mental status is at baseline.      Cranial Nerves: No cranial nerve deficit.   Psychiatric:         Mood and Affect: Mood normal.         Behavior: Behavior normal.         Laboratory:  Recent Labs     10/23/20  1522   WBC 12.6*   RBC 5.04   HEMOGLOBIN 15.2   HEMATOCRIT 44.4   MCV 88.1   MCH 30.2   MCHC 34.2   RDW 43.3   PLATELETCT 194   MPV 10.5     Recent Labs     10/23/20  1522   SODIUM 134*   POTASSIUM 4.0   CHLORIDE 100   CO2 20   GLUCOSE 111*   BUN 13   CREATININE 0.99   CALCIUM 9.3     Recent Labs     10/23/20  1522   ALTSGPT 30   ASTSGOT 34   ALKPHOSPHAT 71   TBILIRUBIN 0.9   GLUCOSE 111*         No results for input(s): NTPROBNP in the last 72 hours.      No results for input(s): TROPONINT in the last 72 hours.    Imaging:  US-EXTREMITY VENOUS LOWER UNILAT LEFT               Assessment/Plan:  I anticipate this patient will require at least two midnights for appropriate medical management, necessitating inpatient admission.    Cellulitis of left lower extremity  Assessment & Plan  Pre-diabetic on history  Vanco/zosyn, blood cultures collected      GERD (gastroesophageal reflux disease)- (present on admission)  Assessment & Plan  PPI     Eczema- (present on admission)  Assessment & Plan  Multiple foot lesions with scabs from scratching - likely the initial site of infection introduction.      Insomnia- (present on admission)  Assessment & Plan  ambien      Obesity- (present on admission)  Assessment & Plan  BMI 45.61      Hypertension- (present on admission)  Assessment & Plan  Resume home meds

## 2020-10-24 PROBLEM — R00.0 TACHYCARDIA: Status: ACTIVE | Noted: 2020-10-24

## 2020-10-24 PROBLEM — Z87.898 HISTORY OF PREDIABETES: Status: ACTIVE | Noted: 2020-10-24

## 2020-10-24 PROBLEM — E87.1 HYPONATREMIA: Status: ACTIVE | Noted: 2020-10-24

## 2020-10-24 LAB
ANION GAP SERPL CALC-SCNC: 10 MMOL/L (ref 7–16)
BUN SERPL-MCNC: 11 MG/DL (ref 8–22)
CALCIUM SERPL-MCNC: 8.4 MG/DL (ref 8.4–10.2)
CHLORIDE SERPL-SCNC: 97 MMOL/L (ref 96–112)
CO2 SERPL-SCNC: 22 MMOL/L (ref 20–33)
CREAT SERPL-MCNC: 0.83 MG/DL (ref 0.5–1.4)
ERYTHROCYTE [DISTWIDTH] IN BLOOD BY AUTOMATED COUNT: 44.5 FL (ref 35.9–50)
EST. AVERAGE GLUCOSE BLD GHB EST-MCNC: 128 MG/DL
GLUCOSE SERPL-MCNC: 113 MG/DL (ref 65–99)
HBA1C MFR BLD: 6.1 % (ref 0–5.6)
HCT VFR BLD AUTO: 39.5 % (ref 42–52)
HGB BLD-MCNC: 13.2 G/DL (ref 14–18)
MCH RBC QN AUTO: 30.1 PG (ref 27–33)
MCHC RBC AUTO-ENTMCNC: 33.4 G/DL (ref 33.7–35.3)
MCV RBC AUTO: 90 FL (ref 81.4–97.8)
PLATELET # BLD AUTO: 187 K/UL (ref 164–446)
PMV BLD AUTO: 10.8 FL (ref 9–12.9)
POTASSIUM SERPL-SCNC: 3.8 MMOL/L (ref 3.6–5.5)
RBC # BLD AUTO: 4.39 M/UL (ref 4.7–6.1)
SARS-COV-2 RNA RESP QL NAA+PROBE: NOTDETECTED
SODIUM SERPL-SCNC: 129 MMOL/L (ref 135–145)
SPECIMEN SOURCE: NORMAL
WBC # BLD AUTO: 10.5 K/UL (ref 4.8–10.8)

## 2020-10-24 PROCEDURE — 700111 HCHG RX REV CODE 636 W/ 250 OVERRIDE (IP): Performed by: INTERNAL MEDICINE

## 2020-10-24 PROCEDURE — 770006 HCHG ROOM/CARE - MED/SURG/GYN SEMI*

## 2020-10-24 PROCEDURE — 36415 COLL VENOUS BLD VENIPUNCTURE: CPT

## 2020-10-24 PROCEDURE — 700111 HCHG RX REV CODE 636 W/ 250 OVERRIDE (IP): Performed by: STUDENT IN AN ORGANIZED HEALTH CARE EDUCATION/TRAINING PROGRAM

## 2020-10-24 PROCEDURE — 700102 HCHG RX REV CODE 250 W/ 637 OVERRIDE(OP): Performed by: INTERNAL MEDICINE

## 2020-10-24 PROCEDURE — 80048 BASIC METABOLIC PNL TOTAL CA: CPT

## 2020-10-24 PROCEDURE — 700105 HCHG RX REV CODE 258: Performed by: INTERNAL MEDICINE

## 2020-10-24 PROCEDURE — 99232 SBSQ HOSP IP/OBS MODERATE 35: CPT | Performed by: STUDENT IN AN ORGANIZED HEALTH CARE EDUCATION/TRAINING PROGRAM

## 2020-10-24 PROCEDURE — 83036 HEMOGLOBIN GLYCOSYLATED A1C: CPT

## 2020-10-24 PROCEDURE — 85027 COMPLETE CBC AUTOMATED: CPT

## 2020-10-24 PROCEDURE — 700102 HCHG RX REV CODE 250 W/ 637 OVERRIDE(OP): Performed by: STUDENT IN AN ORGANIZED HEALTH CARE EDUCATION/TRAINING PROGRAM

## 2020-10-24 PROCEDURE — A9270 NON-COVERED ITEM OR SERVICE: HCPCS | Performed by: INTERNAL MEDICINE

## 2020-10-24 PROCEDURE — 700105 HCHG RX REV CODE 258: Performed by: STUDENT IN AN ORGANIZED HEALTH CARE EDUCATION/TRAINING PROGRAM

## 2020-10-24 PROCEDURE — A9270 NON-COVERED ITEM OR SERVICE: HCPCS | Performed by: STUDENT IN AN ORGANIZED HEALTH CARE EDUCATION/TRAINING PROGRAM

## 2020-10-24 RX ORDER — OXYCODONE HYDROCHLORIDE 10 MG/1
5 TABLET ORAL
Status: DISCONTINUED | OUTPATIENT
Start: 2020-10-24 | End: 2020-10-25 | Stop reason: HOSPADM

## 2020-10-24 RX ORDER — SODIUM CHLORIDE 9 MG/ML
1000 INJECTION, SOLUTION INTRAVENOUS ONCE
Status: COMPLETED | OUTPATIENT
Start: 2020-10-24 | End: 2020-10-24

## 2020-10-24 RX ORDER — OXYCODONE HYDROCHLORIDE 5 MG/1
2.5 TABLET ORAL
Status: DISCONTINUED | OUTPATIENT
Start: 2020-10-24 | End: 2020-10-25 | Stop reason: HOSPADM

## 2020-10-24 RX ORDER — IBUPROFEN 400 MG/1
400 TABLET ORAL EVERY 6 HOURS PRN
Status: DISCONTINUED | OUTPATIENT
Start: 2020-10-24 | End: 2020-10-25 | Stop reason: HOSPADM

## 2020-10-24 RX ADMIN — IBUPROFEN 400 MG: 400 TABLET ORAL at 10:59

## 2020-10-24 RX ADMIN — VANCOMYCIN HYDROCHLORIDE 2000 MG: 500 INJECTION, POWDER, LYOPHILIZED, FOR SOLUTION INTRAVENOUS at 07:15

## 2020-10-24 RX ADMIN — IBUPROFEN 400 MG: 400 TABLET ORAL at 20:03

## 2020-10-24 RX ADMIN — SODIUM CHLORIDE 2 G: 9 INJECTION, SOLUTION INTRAVENOUS at 15:22

## 2020-10-24 RX ADMIN — AMLODIPINE BESYLATE 5 MG: 5 TABLET ORAL at 05:48

## 2020-10-24 RX ADMIN — BUDESONIDE AND FORMOTEROL FUMARATE DIHYDRATE 2 PUFF: 160; 4.5 AEROSOL RESPIRATORY (INHALATION) at 05:48

## 2020-10-24 RX ADMIN — ENOXAPARIN SODIUM 40 MG: 40 INJECTION SUBCUTANEOUS at 18:08

## 2020-10-24 RX ADMIN — SODIUM CHLORIDE 1000 ML: 9 INJECTION, SOLUTION INTRAVENOUS at 10:49

## 2020-10-24 RX ADMIN — SODIUM CHLORIDE 2 G: 9 INJECTION, SOLUTION INTRAVENOUS at 21:51

## 2020-10-24 RX ADMIN — ZOLPIDEM TARTRATE 10 MG: 5 TABLET ORAL at 21:51

## 2020-10-24 RX ADMIN — FENOFIBRATE 134 MG: 134 CAPSULE ORAL at 18:08

## 2020-10-24 RX ADMIN — LISINOPRIL 20 MG: 20 TABLET ORAL at 05:48

## 2020-10-24 RX ADMIN — SODIUM CHLORIDE 2 G: 9 INJECTION, SOLUTION INTRAVENOUS at 10:49

## 2020-10-24 RX ADMIN — ACETAMINOPHEN 650 MG: 325 TABLET, FILM COATED ORAL at 04:38

## 2020-10-24 RX ADMIN — OMEPRAZOLE 20 MG: 20 CAPSULE, DELAYED RELEASE ORAL at 05:48

## 2020-10-24 RX ADMIN — BUDESONIDE AND FORMOTEROL FUMARATE DIHYDRATE 2 PUFF: 160; 4.5 AEROSOL RESPIRATORY (INHALATION) at 18:08

## 2020-10-24 ASSESSMENT — ENCOUNTER SYMPTOMS
DEPRESSION: 0
SHORTNESS OF BREATH: 0
VOMITING: 0
HEADACHES: 1
CHILLS: 0
MYALGIAS: 1
CONSTIPATION: 0
FEVER: 0
NAUSEA: 0
BLURRED VISION: 0
NERVOUS/ANXIOUS: 0
WEAKNESS: 0
LOSS OF CONSCIOUSNESS: 0
FALLS: 0
INSOMNIA: 1
DIARRHEA: 0
ABDOMINAL PAIN: 0

## 2020-10-24 ASSESSMENT — PAIN DESCRIPTION - PAIN TYPE: TYPE: ACUTE PAIN
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
## 2020-10-24 NOTE — DIETARY
NUTRITION SERVICES: BMI - Pt with BMI >40 (=Body mass index is 45.77 kg/m².), morbid obesity. Weight loss counseling not appropriate in acute care setting. RECOMMEND - Referral to outpatient nutrition services for weight management after D/C as appropriate.

## 2020-10-24 NOTE — FLOWSHEET NOTE
10/23/20 1802   Vital Signs   Pulse 97   Respiration 15   Pulse Oximetry 96 %   $ Pulse Oximetry (Spot Check) Yes   Respiratory Assessment   Level of Consciousness Alert   Breath Sounds   RUL Breath Sounds Clear   RML Breath Sounds Clear   RLL Breath Sounds Clear   SOLOMON Breath Sounds Clear   LLL Breath Sounds Clear   Oxygen   O2 (LPM) 0   O2 Delivery Device Room air w/o2 available   Smoking History   Have you ever smoked Never

## 2020-10-24 NOTE — PROGRESS NOTES
Pt sitting up in bed. Pt c/o 3/10 in LLE. No n/v. Pt states no needs at this time. Fall precautions in place.

## 2020-10-24 NOTE — PROGRESS NOTES
"Hospital Medicine Daily Progress Note    Date of Service  10/24/2020    Chief Complaint  55 y.o. male admitted 10/23/2020 with LLE cellulitis    Hospital Course    Admitted for LLE cellulitis and broadly covered with vancomycin and unasyn, which has been narrowed to cefazolin monotherapy in absence of purulence nor history of MRSA.      Interval Problem Update  He reports having a \"sprained muscle\" sensation, then general malaise, then headache, then severe swelling / erythema / pain in his LLE over the past 3 days. His wife marked the borders of the erythema. He went to  and was directed to the ED. He denies F/C, presyncope, syncope, blurry vision, N/V.     He reports having worsening erythema extending to his thigh with burning pain.   The pain and swelling seems to be improving today since starting antibiotics.   Pain improves with ibuprofen, so he would like to try that here.    He wears steel-toed boots and has chronic itchy dry skin from athlete's foot. He believes he has seen a podiatrist before.    He denies noting purulence nor h/o MRSA.    He brought his own CPAP machine. He only uses ambien when traveling for work.    He feels dehydrated. He normally drinks 3 L or more a day of water. He's requested his wife bring him cucumber-melon gatmeloniede.    LLE US was negative for DVT.    -108    WBC 12.6, Hgb 15.6, 80% PMNs  Na 129, K 4  Lactate 1.7, 1.9, 1.7    Consultants/Specialty  None    Code Status  Full Code    Disposition  Home pending ongoing cellulitis improvement on PO antibiotics    Review of Systems  Review of Systems   Constitutional: Negative for chills, fever and malaise/fatigue.   Eyes: Negative for blurred vision.   Respiratory: Negative for shortness of breath.    Cardiovascular: Negative for chest pain.   Gastrointestinal: Negative for abdominal pain, constipation, diarrhea, nausea and vomiting.   Genitourinary: Negative for dysuria and hematuria.   Musculoskeletal: Positive for myalgias. " Negative for falls.   Skin: Positive for itching and rash.   Neurological: Positive for headaches. Negative for loss of consciousness and weakness.   Psychiatric/Behavioral: Negative for depression. The patient has insomnia. The patient is not nervous/anxious.         Physical Exam  Temp:  [36.7 °C (98 °F)-37.1 °C (98.7 °F)] 36.7 °C (98 °F)  Pulse:  [] 82  Resp:  [15-18] 18  BP: (116-149)/(55-78) 116/68  SpO2:  [92 %-96 %] 95 %    Physical Exam  Vitals signs and nursing note reviewed.   Constitutional:       General: He is not in acute distress.     Appearance: He is obese. He is not ill-appearing, toxic-appearing or diaphoretic.   HENT:      Head: Normocephalic.      Nose: Nose normal.      Mouth/Throat:      Mouth: Mucous membranes are moist.      Pharynx: Oropharynx is clear.   Eyes:      General: No scleral icterus.     Conjunctiva/sclera: Conjunctivae normal.   Neck:      Musculoskeletal: Neck supple.   Cardiovascular:      Rate and Rhythm: Regular rhythm. Tachycardia present.      Pulses: Normal pulses.      Heart sounds: Normal heart sounds. No murmur. No friction rub. No gallop.    Pulmonary:      Effort: Pulmonary effort is normal. No respiratory distress.      Breath sounds: Normal breath sounds. No wheezing, rhonchi or rales.   Abdominal:      General: Abdomen is protuberant. Bowel sounds are normal. There is no distension.      Palpations: Abdomen is soft.      Tenderness: There is no abdominal tenderness. There is no guarding or rebound.   Genitourinary:     Comments: No pierre  Musculoskeletal:         General: Swelling and tenderness (Mild in distribution of cellulitis) present.      Right lower leg: No edema.      Left lower leg: Edema present.   Skin:     General: Skin is warm and dry.      Findings: Erythema (Patchy and receding from marked borders) and rash present. No abrasion or laceration. Rash is scaling (BLE on dorsum and sides of feet).      Comments: Calor in distribution of  cellulitis. No apparent wounds between Left toes.   Neurological:      Mental Status: He is alert.      Comments: Appropriately conversant   Psychiatric:         Mood and Affect: Mood normal.         Behavior: Behavior normal.         Thought Content: Thought content normal.         Judgment: Judgment normal.         Fluids    Intake/Output Summary (Last 24 hours) at 10/24/2020 1642  Last data filed at 10/24/2020 1200  Gross per 24 hour   Intake 1340 ml   Output 300 ml   Net 1040 ml       Laboratory  Recent Labs     10/23/20  1522 10/24/20  0440   WBC 12.6* 10.5   RBC 5.04 4.39*   HEMOGLOBIN 15.2 13.2*   HEMATOCRIT 44.4 39.5*   MCV 88.1 90.0   MCH 30.2 30.1   MCHC 34.2 33.4*   RDW 43.3 44.5   PLATELETCT 194 187   MPV 10.5 10.8     Recent Labs     10/23/20  1522 10/24/20  0440   SODIUM 134* 129*   POTASSIUM 4.0 3.8   CHLORIDE 100 97   CO2 20 22   GLUCOSE 111* 113*   BUN 13 11   CREATININE 0.99 0.83   CALCIUM 9.3 8.4                   Imaging  US-EXTREMITY VENOUS LOWER UNILAT LEFT   Final Result           Assessment/Plan  Cellulitis of left lower extremity- (present on admission)  Assessment & Plan  WBC 12.6, no other SIRS criteria  Nonpurulent and no history of MRSA  Vancomycin transitioned to ancef IV  If ongoing improvement in borders, will transition to keflex tomorrow  BCx on admission NGTD  I personally reviewed the microbiology data  LLE US negative for DVT  I personally reviewed the LLE US report  Ibuprofen PRN and oxycodone PRN for pain  Repeat CBC tomorrow        Tachycardia  Assessment & Plan  Resolved after NS 1 L infusion  Likely hypovolemic due to increased insensible losses from cellulitis      History of prediabetes- (present on admission)  Assessment & Plan  A1c previously 5.8-6.2   Repeat A1c ordered    Tinea pedis of both feet- (present on admission)  Assessment & Plan  Pruritus scales on dorsum and sides of feet  Reports wearing steel-toed boots  Likely the source of cellulitis  Topicals deferred  due to cellulitis, griseofulvin deferred while utilizing PRN ibuprofen  Follow up with podiatrist after discharge      Hypertension- (present on admission)  Assessment & Plan  Continue amlodipine      Hyponatremia- (present on admission)  Assessment & Plan  Moderate, Na 129  Na 134 on admission  Encouraged to stay hydrated  NS 1L due to tachycardia  Repeat BMP tomorrow    GERD (gastroesophageal reflux disease)- (present on admission)  Assessment & Plan  Continue omeprazole    Insomnia- (present on admission)  Assessment & Plan  Uses CPAP consistently  Continue Ambien PRN      Obstructive sleep apnea- (present on admission)  Assessment & Plan  Brought his own CPAP  Congratulated and encouraged ongoing use    Obesity- (present on admission)  Assessment & Plan  BMI 45.77  RD recommends referral to weight loss program post-discharge         VTE prophylaxis: Enoxaparin    Labs ordered for tomorrow: CBC, BMP

## 2020-10-24 NOTE — PROGRESS NOTES
"Pharmacy Kinetics 55 y.o. male on vancomycin day # 1 10/23/2020    Currently on Vancomycin 2000 mg iv q12hr (0600, 1800)  Provider specified end date: TBD    Indication for Treatment: LE cellulitis    Pertinent history per medical record: Admitted on 10/23/2020 for left leg pain and swelling, onset 3-4 days ago. Presented to [facility] where he was evaluated for COVID. Redness developed on leg yesterday after discharge, spread to groin today prompting presentation to ED. Pt states he has had a few episodes of N/V as well as fever and chills. LE US negative for DVT.  No history of vancomycin at this facility.    Other antibiotics: n/a (one dose of Unasyn 3 g received in ED)    Allergies: Patient has no known allergies.     List concerns for renal function: BMI 45.77    Pertinent cultures to date:   10/23: blood x2: pending      Recent Labs     10/23/20  1522   WBC 12.6*   NEUTSPOLYS 80.30*     Recent Labs     10/23/20  1522   BUN 13   CREATININE 0.99   ALBUMIN 3.9       Intake/Output Summary (Last 24 hours) at 10/23/2020 2034  Last data filed at 10/23/2020 1627  Gross per 24 hour   Intake 100 ml   Output --   Net 100 ml      /78   Pulse 97   Temp 36.3 °C (97.4 °F)   Resp 15   Ht 1.727 m (5' 8\")   Wt (!) 136.5 kg (301 lb)   SpO2 96%  Temp (24hrs), Av.3 °C (97.4 °F), Min:36.3 °C (97.4 °F), Max:36.3 °C (97.4 °F)      A/P   1. Vancomycin dose change: new start  2. Next vancomycin level: next 24-48 hours (not ordered)  3. Goal trough: 10-15 mcg/mL  4. Comments: vancomycin initiated for LE cellulitis. Some concern for accumulation given elevated BMI. Will proceed with 2000 mg q12h (~14.5 mg/kg). Pharmacy will continue to follow.    Josué Spain, PharmD  "

## 2020-10-24 NOTE — ASSESSMENT & PLAN NOTE
Moderate, Na 129  Na 134 on admission  Encouraged to stay hydrated  NS 1L due to tachycardia  Repeat BMP tomorrow

## 2020-10-25 VITALS
TEMPERATURE: 99.2 F | HEART RATE: 88 BPM | WEIGHT: 301 LBS | OXYGEN SATURATION: 95 % | RESPIRATION RATE: 18 BRPM | DIASTOLIC BLOOD PRESSURE: 63 MMHG | HEIGHT: 68 IN | SYSTOLIC BLOOD PRESSURE: 138 MMHG | BODY MASS INDEX: 45.62 KG/M2

## 2020-10-25 PROBLEM — R73.03 PREDIABETES: Status: ACTIVE | Noted: 2020-10-24

## 2020-10-25 PROBLEM — E87.1 HYPONATREMIA: Status: RESOLVED | Noted: 2020-10-24 | Resolved: 2020-10-25

## 2020-10-25 PROBLEM — R00.0 TACHYCARDIA: Status: RESOLVED | Noted: 2020-10-24 | Resolved: 2020-10-25

## 2020-10-25 LAB
ANION GAP SERPL CALC-SCNC: 11 MMOL/L (ref 7–16)
BUN SERPL-MCNC: 10 MG/DL (ref 8–22)
CALCIUM SERPL-MCNC: 8.1 MG/DL (ref 8.4–10.2)
CHLORIDE SERPL-SCNC: 104 MMOL/L (ref 96–112)
CO2 SERPL-SCNC: 22 MMOL/L (ref 20–33)
CREAT SERPL-MCNC: 0.71 MG/DL (ref 0.5–1.4)
ERYTHROCYTE [DISTWIDTH] IN BLOOD BY AUTOMATED COUNT: 42.5 FL (ref 35.9–50)
GLUCOSE SERPL-MCNC: 155 MG/DL (ref 65–99)
HCT VFR BLD AUTO: 37.6 % (ref 42–52)
HGB BLD-MCNC: 12.7 G/DL (ref 14–18)
MCH RBC QN AUTO: 29.8 PG (ref 27–33)
MCHC RBC AUTO-ENTMCNC: 33.8 G/DL (ref 33.7–35.3)
MCV RBC AUTO: 88.3 FL (ref 81.4–97.8)
PLATELET # BLD AUTO: 213 K/UL (ref 164–446)
PMV BLD AUTO: 10.5 FL (ref 9–12.9)
POTASSIUM SERPL-SCNC: 3.5 MMOL/L (ref 3.6–5.5)
RBC # BLD AUTO: 4.26 M/UL (ref 4.7–6.1)
SODIUM SERPL-SCNC: 137 MMOL/L (ref 135–145)
VANCOMYCIN TROUGH SERPL-MCNC: <4 UG/ML (ref 10–20)
WBC # BLD AUTO: 10 K/UL (ref 4.8–10.8)

## 2020-10-25 PROCEDURE — 80202 ASSAY OF VANCOMYCIN: CPT

## 2020-10-25 PROCEDURE — 700105 HCHG RX REV CODE 258: Performed by: STUDENT IN AN ORGANIZED HEALTH CARE EDUCATION/TRAINING PROGRAM

## 2020-10-25 PROCEDURE — 99239 HOSP IP/OBS DSCHRG MGMT >30: CPT | Performed by: STUDENT IN AN ORGANIZED HEALTH CARE EDUCATION/TRAINING PROGRAM

## 2020-10-25 PROCEDURE — 700102 HCHG RX REV CODE 250 W/ 637 OVERRIDE(OP): Performed by: STUDENT IN AN ORGANIZED HEALTH CARE EDUCATION/TRAINING PROGRAM

## 2020-10-25 PROCEDURE — A9270 NON-COVERED ITEM OR SERVICE: HCPCS | Performed by: STUDENT IN AN ORGANIZED HEALTH CARE EDUCATION/TRAINING PROGRAM

## 2020-10-25 PROCEDURE — 80048 BASIC METABOLIC PNL TOTAL CA: CPT

## 2020-10-25 PROCEDURE — 36415 COLL VENOUS BLD VENIPUNCTURE: CPT

## 2020-10-25 PROCEDURE — 700111 HCHG RX REV CODE 636 W/ 250 OVERRIDE (IP): Performed by: STUDENT IN AN ORGANIZED HEALTH CARE EDUCATION/TRAINING PROGRAM

## 2020-10-25 PROCEDURE — A9270 NON-COVERED ITEM OR SERVICE: HCPCS | Performed by: INTERNAL MEDICINE

## 2020-10-25 PROCEDURE — 700102 HCHG RX REV CODE 250 W/ 637 OVERRIDE(OP): Performed by: INTERNAL MEDICINE

## 2020-10-25 PROCEDURE — 85027 COMPLETE CBC AUTOMATED: CPT

## 2020-10-25 RX ORDER — CEPHALEXIN 500 MG/1
500 CAPSULE ORAL 4 TIMES DAILY
Qty: 20 CAP | Refills: 0 | Status: SHIPPED | OUTPATIENT
Start: 2020-10-25 | End: 2020-10-30

## 2020-10-25 RX ORDER — CEPHALEXIN 500 MG/1
500 CAPSULE ORAL 4 TIMES DAILY
Qty: 20 CAP | Refills: 0 | Status: CANCELLED | OUTPATIENT
Start: 2020-10-25

## 2020-10-25 RX ORDER — CEPHALEXIN 250 MG/1
500 CAPSULE ORAL 4 TIMES DAILY
Status: DISCONTINUED | OUTPATIENT
Start: 2020-10-25 | End: 2020-10-25 | Stop reason: HOSPADM

## 2020-10-25 RX ORDER — ACETAMINOPHEN 500 MG
500-1000 TABLET ORAL EVERY 6 HOURS PRN
COMMUNITY
Start: 2020-10-25 | End: 2021-11-30

## 2020-10-25 RX ADMIN — ENOXAPARIN SODIUM 40 MG: 40 INJECTION SUBCUTANEOUS at 06:00

## 2020-10-25 RX ADMIN — AMLODIPINE BESYLATE 5 MG: 5 TABLET ORAL at 05:19

## 2020-10-25 RX ADMIN — SODIUM CHLORIDE 2 G: 9 INJECTION, SOLUTION INTRAVENOUS at 06:00

## 2020-10-25 RX ADMIN — IBUPROFEN 400 MG: 400 TABLET ORAL at 05:19

## 2020-10-25 RX ADMIN — LISINOPRIL 20 MG: 20 TABLET ORAL at 05:19

## 2020-10-25 RX ADMIN — BUDESONIDE AND FORMOTEROL FUMARATE DIHYDRATE 2 PUFF: 160; 4.5 AEROSOL RESPIRATORY (INHALATION) at 05:21

## 2020-10-25 RX ADMIN — OMEPRAZOLE 20 MG: 20 CAPSULE, DELAYED RELEASE ORAL at 05:19

## 2020-10-25 ASSESSMENT — PAIN DESCRIPTION - PAIN TYPE: TYPE: ACUTE PAIN

## 2020-10-25 NOTE — PROGRESS NOTES
Report received from night shift RN. Assume care. Pt. AAOx4 pt is bed,  Assessment completed. VSS. Denies pain, Pt is fully ambulatory with steady gait.  Pt was update for the care for the day. White board updated, All question answered. Pt has call light within reach,  bed is in the lowest position. Pt has no other needs at this time.

## 2020-10-25 NOTE — ASSESSMENT & PLAN NOTE
Resolved after NS 1 L infusion  Likely hypovolemic due to increased insensible losses from cellulitis

## 2020-10-25 NOTE — DISCHARGE SUMMARY
Discharge Summary    CHIEF COMPLAINT ON ADMISSION  Chief Complaint   Patient presents with   • Leg Swelling       Reason for Admission  LLE Cellulitis    Admission Date  10/23/2020    CODE STATUS  Full Code    HPI & HOSPITAL COURSE  French Hurd is a 54 yo Man with HTN, JOSSY, tinea pedis, prediabetes, and GERD  Admitted for LLE cellulitis and broadly covered with vancomycin and unasyn, which has been narrowed to cefazolin monotherapy in absence of purulence nor history of MRSA.  He was transitioned to IV cefazolin with ongoing improvement in erythema, pain, and swelling. He was afebrile, hemodynamically stable, and ambulating prior to discharge, prompting transition to keflex to complete a 7-day course. Repeat A1c during this admission was 6.1, for which he was counseled on weight loss to reduce risk of developing DM.    Therefore, he is discharged in good and stable condition to home with close outpatient follow-up.    The patient met 2-midnight criteria for an inpatient stay at the time of discharge.    Discharge Date  10/25/2020    FOLLOW UP ITEMS POST DISCHARGE  1. Cellulitis - ensure completion of antibiotics and resolution of cellulitis  2. Tinea pedis - cure should reduce risk of recurrent cellulitis  3. Prediabetes - diet and lifestyle modifications would significantly reduce his risk of developing T2DM    DISCHARGE DIAGNOSES  Active Problems:    Cellulitis of left lower extremity POA: Yes    Tinea pedis of both feet POA: Yes    Prediabetes POA: Yes    Hypertension POA: Yes    Obesity POA: Yes    Obstructive sleep apnea POA: Yes    Insomnia POA: Yes    GERD (gastroesophageal reflux disease) POA: Yes  Resolved Problems:    Tachycardia POA: No    Hyponatremia POA: Yes      FOLLOW UP  No future appointments.  Jessi Salvador D.O.  [id number] [place] [id number] [phone]    Schedule an appointment as soon as possible for a visit in 1 week  hospital follow-up, Letter for  [facility]road      MEDICATIONS ON DISCHARGE     Medication List      START taking these medications      Instructions   acetaminophen 500 MG Tabs  Commonly known as: TYLENOL   Take 1-2 Tabs by mouth every 6 hours as needed (Mild Pain; (Pain scale 1-3); Temp greater than 100.5 F).  Dose: 500-1,000 mg     cephALEXin 500 MG Caps  Commonly known as: KEFLEX   Take 1 Cap by mouth 4 times a day for 5 days.  Dose: 500 mg        CONTINUE taking these medications      Instructions   albuterol 108 (90 Base) MCG/ACT Aers inhalation aerosol   Inhale 2 Puffs by mouth 1 time daily as needed.  Dose: 2 Puff     amLODIPine 5 MG Tabs  Commonly known as: NORVASC   Take 5 mg by mouth every day.  Dose: 5 mg     esomeprazole 20 MG capsule  Commonly known as: NEXIUM   Take 20 mg by mouth every day.  Dose: 20 mg     fenofibrate 145 MG Tabs  Commonly known as: TRICOR   Take 145 mg by mouth every evening.  Dose: 145 mg     fexofenadine 180 MG tablet  Commonly known as: ALLEGRA   Take 180 mg by mouth 1 time daily as needed.  Dose: 180 mg     fluticasone 50 MCG/ACT nasal spray  Commonly known as: FLONASE   Spray 1 Spray in nose.  Dose: 1 Spray     fluticasone-salmeterol 100-50 MCG/DOSE Aepb  Commonly known as: ADVAIR   Inhale 1 Puff by mouth every 12 hours.  Dose: 1 Puff     ibuprofen 200 MG Tabs  Commonly known as: MOTRIN   Take 800 mg by mouth 1 time daily as needed for Mild Pain.  Dose: 800 mg     lisinopril 20 MG Tabs  Commonly known as: PRINIVIL   Take 20 mg by mouth every evening.  Dose: 20 mg     tizanidine 4 MG Tabs  Commonly known as: ZANAFLEX   Take 4 mg by mouth at bedtime as needed.  Dose: 4 mg     zolpidem 10 MG Tabs  Commonly known as: AMBIEN   Take 1 Tab by mouth at bedtime as needed for Sleep.  Dose: 10 mg            Allergies  No Known Allergies    DIET  Orders Placed This Encounter   Procedures   • Diet Order Regular     Standing Status:   Standing     Number of Occurrences:   1     Order Specific Question:   Diet:     Answer:    Regular [1]       ACTIVITY  As tolerated.  Weight bearing as tolerated    CONSULTATIONS  None    PROCEDURES  None    LABORATORY  Lab Results   Component Value Date    SODIUM 137 10/25/2020    POTASSIUM 3.5 (L) 10/25/2020    CHLORIDE 104 10/25/2020    CO2 22 10/25/2020    GLUCOSE 155 (H) 10/25/2020    BUN 10 10/25/2020    CREATININE 0.71 10/25/2020    CREATININE 0.8 03/06/2009        Lab Results   Component Value Date    WBC 10.0 10/25/2020    HEMOGLOBIN 12.7 (L) 10/25/2020    HEMATOCRIT 37.6 (L) 10/25/2020    PLATELETCT 213 10/25/2020        Total time of the discharge process exceeds 35 minutes.

## 2020-10-25 NOTE — DISCHARGE PLANNING
Anticipated Discharge Disposition: Home     Action: LSW covering for the weekend. Pt has d/c orders. No known d/c needs at this time.     Barriers to Discharge: None     Plan: LSW to assist as needed

## 2020-10-25 NOTE — PROGRESS NOTES
Pt discharged home with his wife in good and stable condition. Awaiting for his ride at this time.

## 2020-10-25 NOTE — DISCHARGE INSTRUCTIONS
Discharge Instructions    Discharged to home by car with relative. Discharged via walking, hospital escort: Yes.  Special equipment needed: Not Applicable    Be sure to schedule a follow-up appointment with your primary care doctor or any specialists as instructed.     Discharge Plan:   Diet Plan: Discussed  Activity Level: Discussed  Confirmed Follow up Appointment: Patient to Call and Schedule Appointment  Confirmed Symptoms Management: Discussed  Medication Reconciliation Updated: Yes  Influenza Vaccine Indication: Patient Refuses    I understand that a diet low in cholesterol, fat, and sodium is recommended for good health. Unless I have been given specific instructions below for another diet, I accept this instruction as my diet prescription.   Other diet: Regular    Special Instructions: None    · Is patient discharged on Warfarin / Coumadin?   No     Depression / Suicide Risk    As you are discharged from this [facility] facility, it is important to learn how to keep safe from harming yourself.    Recognize the warning signs:  · Abrupt changes in personality, positive or negative- including increase in energy   · Giving away possessions  · Change in eating patterns- significant weight changes-  positive or negative  · Change in sleeping patterns- unable to sleep or sleeping all the time   · Unwillingness or inability to communicate  · Depression  · Unusual sadness, discouragement and loneliness  · Talk of wanting to die  · Neglect of personal appearance   · Rebelliousness- reckless behavior  · Withdrawal from people/activities they love  · Confusion- inability to concentrate     If you or a loved one observes any of these behaviors or has concerns about self-harm, here's what you can do:  · Talk about it- your feelings and reasons for harming yourself  · Remove any means that you might use to hurt yourself (examples: pills, rope, extension cords, firearm)  · Get professional help from the community (Mental  Health, Substance Abuse, psychological counseling)  · Do not be alone:Call your Safe Contact- someone whom you trust who will be there for you.  · Call your local CRISIS HOTLINE 429-7933 or 771-256-6690  · Call your local Children's Mobile Crisis Response Team [place] (534) 076-1689 or www.Paice  · Call the toll free National Suicide Prevention Hotlines   · National Suicide Prevention Lifeline 787-854-AIEQ (9673)  · National Hope Line Network 800-SUICIDE (812-4903)      Discharge Instructions per Donell Byrne M.D.    You were hospitalized for an infection of the skin (cellulitis) and improved with antibiotics. Please take all antibiotics as prescribed.    Your A1c was 6.1, indicating pre-diabetes. Discuss weight loss and lifestyle changes with your PCP to reduce your risk of developing diabetes.    DIET: Regular    ACTIVITY: Resume regular activity as able    DIAGNOSIS: Non-purulent LLE Cellulitis    Return to ER if you faint for any reason or your leg symptoms worsen suddenly.        Cellulitis, Adult    Cellulitis is a skin infection. The infected area is usually warm, red, swollen, and tender. This condition occurs most often in the arms and lower legs. The infection can travel to the muscles, blood, and underlying tissue and become serious. It is very important to get treated for this condition.  What are the causes?  Cellulitis is caused by bacteria. The bacteria enter through a break in the skin, such as a cut, burn, insect bite, open sore, or crack.  What increases the risk?  This condition is more likely to occur in people who:  · Have a weak body defense system (immune system).  · Have open wounds on the skin, such as cuts, burns, bites, and scrapes. Bacteria can enter the body through these open wounds.  · Are older than 60 years of age.  · Have diabetes.  · Have a type of long-lasting (chronic) liver disease (cirrhosis) or kidney disease.  · Are obese.  · Have a skin condition such  as:  ? Itchy rash (eczema).  ? Slow movement of blood in the veins (venous stasis).  ? Fluid buildup below the skin (edema).  · Have had radiation therapy.  · Use IV drugs.  What are the signs or symptoms?  Symptoms of this condition include:  · Redness, streaking, or spotting on the skin.  · Swollen area of the skin.  · Tenderness or pain when an area of the skin is touched.  · Warm skin.  · A fever.  · Chills.  · Blisters.  How is this diagnosed?  This condition is diagnosed based on a medical history and physical exam. You may also have tests, including:  · Blood tests.  · Imaging tests.  How is this treated?  Treatment for this condition may include:  · Medicines, such as antibiotic medicines or medicines to treat allergies (antihistamines).  · Supportive care, such as rest and application of cold or warm cloths (compresses) to the skin.  · Hospital care, if the condition is severe.  The infection usually starts to get better within 1-2 days of treatment.  Follow these instructions at home:    Medicines  · Take over-the-counter and prescription medicines only as told by your health care provider.  · If you were prescribed an antibiotic medicine, take it as told by your health care provider. Do not stop taking the antibiotic even if you start to feel better.  General instructions  · Drink enough fluid to keep your urine pale yellow.  · Do not touch or rub the infected area.  · Raise (elevate) the infected area above the level of your heart while you are sitting or lying down.  · Apply warm or cold compresses to the affected area as told by your health care provider.  · Keep all follow-up visits as told by your health care provider. This is important. These visits let your health care provider make sure a more serious infection is not developing.  Contact a health care provider if:  · You have a fever.  · Your symptoms do not begin to improve within 1-2 days of starting treatment.  · Your bone or joint underneath  the infected area becomes painful after the skin has healed.  · Your infection returns in the same area or another area.  · You notice a swollen bump in the infected area.  · You develop new symptoms.  · You have a general ill feeling (malaise) with muscle aches and pains.  Get help right away if:  · Your symptoms get worse.  · You feel very sleepy.  · You develop vomiting or diarrhea that persists.  · You notice red streaks coming from the infected area.  · Your red area gets larger or turns dark in color.  These symptoms may represent a serious problem that is an emergency. Do not wait to see if the symptoms will go away. Get medical help right away. Call your local emergency services (911 in the [place] Do not drive yourself to the hospital.  Summary  · Cellulitis is a skin infection. This condition occurs most often in the arms and lower legs.  · Treatment for this condition may include medicines, such as antibiotic medicines or antihistamines.  · Take over-the-counter and prescription medicines only as told by your health care provider. If you were prescribed an antibiotic medicine, do not stop taking the antibiotic even if you start to feel better.  · Contact a health care provider if your symptoms do not begin to improve within 1-2 days of starting treatment or your symptoms get worse.  · Keep all follow-up visits as told by your health care provider. This is important. These visits let your health care provider make sure that a more serious infection is not developing.  This information is not intended to replace advice given to you by your health care provider. Make sure you discuss any questions you have with your health care provider.  Document Released: 09/27/2006 Document Revised: 05/09/2019 Document Reviewed: 05/09/2019  Elsevier Patient Education © 2020 [employer]

## 2020-10-28 LAB
BACTERIA BLD CULT: NORMAL
BACTERIA BLD CULT: NORMAL
SIGNIFICANT IND 70042: NORMAL
SIGNIFICANT IND 70042: NORMAL
SITE SITE: NORMAL
SITE SITE: NORMAL
SOURCE SOURCE: NORMAL
SOURCE SOURCE: NORMAL

## 2020-11-25 ENCOUNTER — APPOINTMENT (OUTPATIENT)
Dept: RADIOLOGY | Facility: MEDICAL CENTER | Age: 55
End: 2020-11-25
Payer: COMMERCIAL

## 2020-11-25 ENCOUNTER — APPOINTMENT (OUTPATIENT)
Dept: RADIOLOGY | Facility: MEDICAL CENTER | Age: 55
End: 2020-11-25
Attending: EMERGENCY MEDICINE
Payer: COMMERCIAL

## 2020-11-25 ENCOUNTER — HOSPITAL ENCOUNTER (EMERGENCY)
Facility: MEDICAL CENTER | Age: 55
End: 2020-11-26
Attending: EMERGENCY MEDICINE
Payer: COMMERCIAL

## 2020-11-25 DIAGNOSIS — L30.9 ECZEMA, UNSPECIFIED TYPE: ICD-10-CM

## 2020-11-25 DIAGNOSIS — L03.116 CELLULITIS OF LEFT LOWER EXTREMITY: ICD-10-CM

## 2020-11-25 DIAGNOSIS — U07.1 COVID-19 VIRUS INFECTION: ICD-10-CM

## 2020-11-25 LAB
ALBUMIN SERPL BCP-MCNC: 4.2 G/DL (ref 3.2–4.9)
ALBUMIN/GLOB SERPL: 1.1 G/DL
ALP SERPL-CCNC: 77 U/L (ref 30–99)
ALT SERPL-CCNC: 24 U/L (ref 2–50)
ANION GAP SERPL CALC-SCNC: 12 MMOL/L (ref 7–16)
APPEARANCE UR: CLEAR
AST SERPL-CCNC: 24 U/L (ref 12–45)
BASOPHILS # BLD AUTO: 0.5 % (ref 0–1.8)
BASOPHILS # BLD: 0.05 K/UL (ref 0–0.12)
BILIRUB SERPL-MCNC: 0.8 MG/DL (ref 0.1–1.5)
BILIRUB UR QL STRIP.AUTO: NEGATIVE
BUN SERPL-MCNC: 9 MG/DL (ref 8–22)
CALCIUM SERPL-MCNC: 9.3 MG/DL (ref 8.4–10.2)
CHLORIDE SERPL-SCNC: 101 MMOL/L (ref 96–112)
CO2 SERPL-SCNC: 22 MMOL/L (ref 20–33)
COLOR UR: YELLOW
COVID ORDER STATUS COVID19: NORMAL
CREAT SERPL-MCNC: 0.73 MG/DL (ref 0.5–1.4)
CRP SERPL HS-MCNC: 8.39 MG/DL (ref 0–0.75)
EOSINOPHIL # BLD AUTO: 0.02 K/UL (ref 0–0.51)
EOSINOPHIL NFR BLD: 0.2 % (ref 0–6.9)
EPI CELLS #/AREA URNS HPF: ABNORMAL /HPF
ERYTHROCYTE [DISTWIDTH] IN BLOOD BY AUTOMATED COUNT: 44.9 FL (ref 35.9–50)
FLUAV RNA SPEC QL NAA+PROBE: NEGATIVE
FLUBV RNA SPEC QL NAA+PROBE: NEGATIVE
GLOBULIN SER CALC-MCNC: 3.9 G/DL (ref 1.9–3.5)
GLUCOSE SERPL-MCNC: 133 MG/DL (ref 65–99)
GLUCOSE UR STRIP.AUTO-MCNC: NEGATIVE MG/DL
HCT VFR BLD AUTO: 42.8 % (ref 42–52)
HGB BLD-MCNC: 14.5 G/DL (ref 14–18)
IMM GRANULOCYTES # BLD AUTO: 0.03 K/UL (ref 0–0.11)
IMM GRANULOCYTES NFR BLD AUTO: 0.3 % (ref 0–0.9)
KETONES UR STRIP.AUTO-MCNC: NEGATIVE MG/DL
LACTATE BLD-SCNC: 1.6 MMOL/L (ref 0.5–2)
LEUKOCYTE ESTERASE UR QL STRIP.AUTO: NEGATIVE
LYMPHOCYTES # BLD AUTO: 1.16 K/UL (ref 1–4.8)
LYMPHOCYTES NFR BLD: 12.1 % (ref 22–41)
MCH RBC QN AUTO: 30 PG (ref 27–33)
MCHC RBC AUTO-ENTMCNC: 33.9 G/DL (ref 33.7–35.3)
MCV RBC AUTO: 88.6 FL (ref 81.4–97.8)
MICRO URNS: ABNORMAL
MONOCYTES # BLD AUTO: 1.14 K/UL (ref 0–0.85)
MONOCYTES NFR BLD AUTO: 11.9 % (ref 0–13.4)
MUCOUS THREADS #/AREA URNS HPF: ABNORMAL /HPF
NEUTROPHILS # BLD AUTO: 7.15 K/UL (ref 1.82–7.42)
NEUTROPHILS NFR BLD: 75 % (ref 44–72)
NITRITE UR QL STRIP.AUTO: NEGATIVE
NRBC # BLD AUTO: 0 K/UL
NRBC BLD-RTO: 0 /100 WBC
PH UR STRIP.AUTO: 8.5 [PH] (ref 5–8)
PLATELET # BLD AUTO: 228 K/UL (ref 164–446)
PMV BLD AUTO: 10.1 FL (ref 9–12.9)
POTASSIUM SERPL-SCNC: 3.9 MMOL/L (ref 3.6–5.5)
PROCALCITONIN SERPL-MCNC: 0.1 NG/ML
PROT SERPL-MCNC: 8.1 G/DL (ref 6–8.2)
PROT UR QL STRIP: 30 MG/DL
RBC # BLD AUTO: 4.83 M/UL (ref 4.7–6.1)
RBC # URNS HPF: ABNORMAL /HPF
RBC UR QL AUTO: NEGATIVE
RSV RNA SPEC QL NAA+PROBE: NEGATIVE
SARS-COV-2 RNA RESP QL NAA+PROBE: DETECTED
SODIUM SERPL-SCNC: 135 MMOL/L (ref 135–145)
SP GR UR STRIP.AUTO: 1.01
SPECIMEN SOURCE: ABNORMAL
WBC # BLD AUTO: 9.6 K/UL (ref 4.8–10.8)
WBC #/AREA URNS HPF: ABNORMAL /HPF

## 2020-11-25 PROCEDURE — 96375 TX/PRO/DX INJ NEW DRUG ADDON: CPT

## 2020-11-25 PROCEDURE — 700105 HCHG RX REV CODE 258: Performed by: EMERGENCY MEDICINE

## 2020-11-25 PROCEDURE — 71045 X-RAY EXAM CHEST 1 VIEW: CPT

## 2020-11-25 PROCEDURE — 83605 ASSAY OF LACTIC ACID: CPT

## 2020-11-25 PROCEDURE — A9270 NON-COVERED ITEM OR SERVICE: HCPCS | Performed by: EMERGENCY MEDICINE

## 2020-11-25 PROCEDURE — 700111 HCHG RX REV CODE 636 W/ 250 OVERRIDE (IP): Performed by: EMERGENCY MEDICINE

## 2020-11-25 PROCEDURE — 87086 URINE CULTURE/COLONY COUNT: CPT

## 2020-11-25 PROCEDURE — 87040 BLOOD CULTURE FOR BACTERIA: CPT

## 2020-11-25 PROCEDURE — 96365 THER/PROPH/DIAG IV INF INIT: CPT

## 2020-11-25 PROCEDURE — 86140 C-REACTIVE PROTEIN: CPT

## 2020-11-25 PROCEDURE — 0241U HCHG SARS-COV-2 COVID-19 NFCT DS RESP RNA 4 TRGT MIC: CPT

## 2020-11-25 PROCEDURE — 99285 EMERGENCY DEPT VISIT HI MDM: CPT

## 2020-11-25 PROCEDURE — 84145 PROCALCITONIN (PCT): CPT

## 2020-11-25 PROCEDURE — 36415 COLL VENOUS BLD VENIPUNCTURE: CPT

## 2020-11-25 PROCEDURE — 85025 COMPLETE CBC W/AUTO DIFF WBC: CPT

## 2020-11-25 PROCEDURE — 80053 COMPREHEN METABOLIC PANEL: CPT

## 2020-11-25 PROCEDURE — 81001 URINALYSIS AUTO W/SCOPE: CPT

## 2020-11-25 PROCEDURE — 700102 HCHG RX REV CODE 250 W/ 637 OVERRIDE(OP): Performed by: EMERGENCY MEDICINE

## 2020-11-25 RX ORDER — HYDROMORPHONE HYDROCHLORIDE 1 MG/ML
1 INJECTION, SOLUTION INTRAMUSCULAR; INTRAVENOUS; SUBCUTANEOUS ONCE
Status: COMPLETED | OUTPATIENT
Start: 2020-11-25 | End: 2020-11-25

## 2020-11-25 RX ORDER — ACETAMINOPHEN 500 MG
1000 TABLET ORAL ONCE
Status: COMPLETED | OUTPATIENT
Start: 2020-11-25 | End: 2020-11-25

## 2020-11-25 RX ORDER — SODIUM CHLORIDE 9 MG/ML
1000 INJECTION, SOLUTION INTRAVENOUS ONCE
Status: COMPLETED | OUTPATIENT
Start: 2020-11-25 | End: 2020-11-26

## 2020-11-25 RX ADMIN — HYDROMORPHONE HYDROCHLORIDE 1 MG: 1 INJECTION, SOLUTION INTRAMUSCULAR; INTRAVENOUS; SUBCUTANEOUS at 22:51

## 2020-11-25 RX ADMIN — ACETAMINOPHEN 1000 MG: 500 TABLET ORAL at 22:51

## 2020-11-25 RX ADMIN — SODIUM CHLORIDE 3 G: 900 INJECTION INTRAVENOUS at 22:51

## 2020-11-25 RX ADMIN — SODIUM CHLORIDE 1000 ML: 9 INJECTION, SOLUTION INTRAVENOUS at 22:51

## 2020-11-25 ASSESSMENT — ENCOUNTER SYMPTOMS
SORE THROAT: 0
ROS SKIN COMMENTS: LEFT LOWER EXTREMITY ERYTHEMA
SHORTNESS OF BREATH: 0
EYE REDNESS: 0
HEADACHES: 0
BLURRED VISION: 0
CHILLS: 0
COUGH: 0
ABDOMINAL PAIN: 0
BACK PAIN: 0
VOMITING: 0
FOCAL WEAKNESS: 0
FEVER: 0
NECK PAIN: 0
SEIZURES: 0

## 2020-11-25 ASSESSMENT — PAIN DESCRIPTION - PAIN TYPE: TYPE: ACUTE PAIN

## 2020-11-25 ASSESSMENT — FIBROSIS 4 INDEX: FIB4 SCORE: 1.54

## 2020-11-26 VITALS
SYSTOLIC BLOOD PRESSURE: 143 MMHG | TEMPERATURE: 99.7 F | BODY MASS INDEX: 43.6 KG/M2 | OXYGEN SATURATION: 97 % | DIASTOLIC BLOOD PRESSURE: 83 MMHG | HEIGHT: 68 IN | RESPIRATION RATE: 18 BRPM | WEIGHT: 287.7 LBS | HEART RATE: 93 BPM

## 2020-11-26 PROCEDURE — 93971 EXTREMITY STUDY: CPT | Mod: LT

## 2020-11-26 RX ORDER — OXYCODONE HYDROCHLORIDE 5 MG/1
5 TABLET ORAL EVERY 4 HOURS PRN
Qty: 8 TAB | Refills: 0 | Status: SHIPPED | OUTPATIENT
Start: 2020-11-26 | End: 2020-11-29

## 2020-11-26 RX ORDER — CEPHALEXIN 500 MG/1
1000 CAPSULE ORAL 3 TIMES DAILY
Qty: 42 CAP | Refills: 0 | Status: SHIPPED | OUTPATIENT
Start: 2020-11-26 | End: 2020-12-03

## 2020-11-26 RX ORDER — HYDROXYZINE HYDROCHLORIDE 25 MG/1
25 TABLET, FILM COATED ORAL 3 TIMES DAILY PRN
Qty: 20 TAB | Refills: 0 | Status: SHIPPED | OUTPATIENT
Start: 2020-11-26

## 2020-11-26 RX ORDER — DOXYCYCLINE 100 MG/1
100 CAPSULE ORAL 2 TIMES DAILY
Qty: 14 CAP | Refills: 0 | Status: SHIPPED | OUTPATIENT
Start: 2020-11-26 | End: 2020-12-03

## 2020-11-26 NOTE — ED NOTES
Pt provided with discharge instructions and prescription education. Pt declines any questions at this time. Pt to ambulate out of the ER.

## 2020-11-26 NOTE — DISCHARGE INSTRUCTIONS
You were seen in the Emergency Department for cellulitis of your lower extremity.  You also did test positive for COVID-19.    Labs and ultrasound were completed without significant acute abnormalities.    Please use 1,000mg of tylenol or 600mg of ibuprofen every 6 hours as needed for pain.  Take antibiotics as directed.  Elevate the leg as much as possible.  Please isolate for the next 2 weeks due to your Covid infection    Please follow up with your primary care physician.    Return to the Emergency Department with persistent fevers, chest pain or shortness of breath, lightheadedness or fainting, worsening redness of the extremity or other concerns.    There has been very limited evidence that the following vitamin cocktail may help with COVID infection.  These are all available to buy over-the-counter at the drugstore.  Vitamin C 500 mg twice daily  Zinc 75 to 100 mg/day  Melatonin 5 mg at night  Vitamin D 2000 to 4000 units/day  Aspirin 81 mg a day

## 2020-11-26 NOTE — ED PROVIDER NOTES
"ED Provider Note    CHIEF COMPLAINT  Chief Complaint   Patient presents with   • Leg Pain     Reports \"I have cellulitis on my L lower leg\". States seen here about 1.5months ago for same. States he got off antibx approx 1.5 weeks ago for this. Reports fever T max 103- denies cold sx.       HPI  French Hurd is a 55 y.o. male with past medical history of hypertension who presents with left lower extremity pain.  The patient was seen here in October and treated for left lower extremity cellulitis.  He states he was doing better about 2 weeks ago and went back to work however over the last few days has developed worsening pain and redness to the left lower extremity.  He denies any history of trauma.  He did develop a fever over the last 24 hours at home.  He is not had any other associated symptoms including sore throat, nasal congestion, cough.  No vomiting or diarrhea.  He has had a diminished appetite.  There is no chest pain or shortness of breath.  He has no history of prior blood clots.  He did take a dose of oral Augmentin that he had at home prior to coming in today however is not taking any other medications.    REVIEW OF SYSTEMS  See HPI for further details.   Review of Systems   Constitutional: Negative for chills and fever.   HENT: Negative for sore throat.    Eyes: Negative for blurred vision and redness.   Respiratory: Negative for cough and shortness of breath.    Cardiovascular: Negative for chest pain and leg swelling.   Gastrointestinal: Negative for abdominal pain and vomiting.   Genitourinary: Negative for dysuria and urgency.   Musculoskeletal: Negative for back pain and neck pain.        Left leg pain   Skin: Negative for rash.        Left lower extremity erythema   Neurological: Negative for focal weakness, seizures and headaches.   Psychiatric/Behavioral: Negative for suicidal ideas.         PAST MEDICAL HISTORY   has a past medical history of Allergy, ASTHMA, Back pain, BPH (benign " "prostatic hypertrophy), Breast mass, Cervical spine degeneration, Chest pain, Difficulty breathing, Earache, ED (erectile dysfunction), High cholesterol, Hypercholesteremia, Hypertension, Obesity, Osteoarthritis, Prediabetes, Right arm pain, Ringing in ears, Sleep apnea, Sweat, sweating, excessive, and Wears glasses.    SOCIAL HISTORY  Social History     Tobacco Use   • Smoking status: Former Smoker     Packs/day: 0.50     Years: 0.50     Pack years: 0.25     Types: Cigarettes     Quit date: 1982     Years since quittin.8   • Smokeless tobacco: Never Used   Substance and Sexual Activity   • Alcohol use: Yes     Comment: 2 per week   • Drug use: No     Comment: hx of ephedirn for weight loss over 2 years ago   • Sexual activity: Yes     Partners: Female       SURGICAL HISTORY   has a past surgical history that includes tonsillectomy (); vasectomy; other orthopedic surgery (Left, ); hardware removal ortho (Right, ); ligament reconstruction (Left, ); appendectomy (); cervical disk and fusion anterior (); ankle orif (Right, ); hardware removal ortho (Right, ); and colonoscopy - endo (2019).    CURRENT MEDICATIONS  Home Medications    **Home medications have not yet been reviewed for this encounter**         ALLERGIES  No Known Allergies    PHYSICAL EXAM   VITAL SIGNS: /83   Pulse 93   Temp 37.6 °C (99.7 °F) (Temporal)   Resp 18   Ht 1.727 m (5' 8\")   Wt (!) 130.5 kg (287 lb 11.2 oz)   SpO2 97%   BMI 43.74 kg/m²      Physical Exam   Constitutional: He is oriented to person, place, and time and well-developed, well-nourished, and in no distress. No distress.   Nontoxic-appearing obese male   HENT:   Head: Normocephalic and atraumatic.   Eyes: Pupils are equal, round, and reactive to light. Conjunctivae are normal.   Neck: Normal range of motion. Neck supple.   Cardiovascular: Normal rate, regular rhythm, normal heart sounds and intact distal pulses. "   Pulmonary/Chest: Effort normal and breath sounds normal. No respiratory distress.   Abdominal: Soft. He exhibits no distension. There is no abdominal tenderness.   Musculoskeletal: Normal range of motion.         General: No tenderness or edema.      Comments: Good range of motion of the left knee and left ankle without significant pain however does have a positive Homans' sign.   Neurological: He is alert and oriented to person, place, and time.   Moving all extremities spontaneously   Skin: Skin is warm and dry. There is erythema.   Erythema extending from the left foot below the left knee, worse on the posterior calf.  He has scattered mall open wounds and excoriations from eczema.   Psychiatric: Affect normal.         DIAGNOSTIC STUDIES      LABS  Personally reviewed by me  Labs Reviewed   CBC WITH DIFFERENTIAL - Abnormal; Notable for the following components:       Result Value    Neutrophils-Polys 75.00 (*)     Lymphocytes 12.10 (*)     Monos (Absolute) 1.14 (*)     All other components within normal limits   COMP METABOLIC PANEL - Abnormal; Notable for the following components:    Glucose 133 (*)     Globulin 3.9 (*)     All other components within normal limits   URINALYSIS - Abnormal; Notable for the following components:    Ph 8.5 (*)     Protein 30 (*)     All other components within normal limits    Narrative:     Indication for culture:->Evaluation for sepsis without a  clear source of infection   URINE MICROSCOPIC (W/UA) - Abnormal; Notable for the following components:    WBC 0-2 (*)     All other components within normal limits    Narrative:     Indication for culture:->Evaluation for sepsis without a  clear source of infection   CRP QUANTITIVE (NON-CARDIAC) - Abnormal; Notable for the following components:    Stat C-Reactive Protein 8.39 (*)     All other components within normal limits   COV-2, FLU A/B, AND RSV BY PCR - Abnormal; Notable for the following components:    SARS-CoV-2 by PCR DETECTED  "(*)     All other components within normal limits    Narrative:     Is patient being admitted?->Yes  Does this patient meet criteria for Rush/Cepheid per [facility]  Inpatient Workflow? (See workflow link below)->Yes  Expected turn around time?->Rush (Cepheid 2-4 hours)  Have you been in close contact with a person who is suspected  or known to be positive for COVID-19 within the last 30 days  (e.g. last seen that person < 30 days ago)->No   LACTIC ACID   URINE CULTURE(NEW)    Narrative:     Indication for culture:->Evaluation for sepsis without a  clear source of infection   BLOOD CULTURE    Narrative:     Per Hospital Policy: Only change Specimen Src: to \"Line\" if  specified by physician order.   BLOOD CULTURE    Narrative:     Per Hospital Policy: Only change Specimen Src: to \"Line\" if  specified by physician order.   ESTIMATED GFR   COVID/SARS COV-2    Narrative:     Is patient being admitted?->Yes  Does this patient meet criteria for Rush/Cepheid per [facility]  Inpatient Workflow? (See workflow link below)->Yes  Expected turn around time?->Rush (Cepheid 2-4 hours)  Have you been in close contact with a person who is suspected  or known to be positive for COVID-19 within the last 30 days  (e.g. last seen that person < 30 days ago)->No   PROCALCITONIN           RADIOLOGY  Personally reviewed by me  US-EXTREMITY VENOUS LOWER UNILAT LEFT   Final Result      No deep venous thrombosis.                  INTERPRETING LOCATION:  [address] [place] [address], [id number]      DX-CHEST-PORTABLE (1 VIEW)   Final Result      No acute cardiac or pulmonary abnormalities are identified.            ED COURSE  Vitals:    11/25/20 1955 11/25/20 2200 11/25/20 2344 11/26/20 0028   BP: 157/90 154/96 145/66 143/83   Pulse: (!) 116 (!) 104 (!) 103 93   Resp: 18 20 18 18   Temp: 37.8 °C (100.1 °F) 37.7 °C (99.9 °F) 37.8 °C (100.1 °F) 37.6 °C (99.7 °F)   TempSrc: Temporal Temporal Temporal Temporal   SpO2: 95% 97% 96% 97%   Weight:       Height:     "         Medications administered:  Medications   acetaminophen (TYLENOL) tablet 1,000 mg (1,000 mg Oral Given 11/25/20 2251)   NS infusion 1,000 mL (0 mL Intravenous Stopped 11/26/20 0048)   HYDROmorphone pf (DILAUDID) injection 1 mg (1 mg Intravenous Given 11/25/20 2251)   ampicillin/sulbactam (UNASYN) 3 g in  mL IVPB (0 g Intravenous Stopped 11/25/20 2321)     Patient was given IV fluids for tachycardia and sepsi.  IV hydration was used because oral hydration was not adequate alone.  Following fluid administration patient's symptoms and hydration status were improved.      Old records personally reviewed:  Reviewed recent admission on October 23 for cellulitis.  The patient was treated with cefazolin and transition to Keflex on discharge.  Ultrasound was difficult to tolerate however did not show obvious DVT.      MEDICAL DECISION MAKING  Relatively healthy patient with recent history of cellulitis 1 month ago who presents with recurrent left lower extremity pain and redness over the last few days.  He is febrile with associated tachycardia on arrival however otherwise normal vital signs.  He has no systemic symptoms of illness.  His exam demonstrates cellulitis of the left lower extremity extending just below the knee.  There is no evidence of neurovascular compromise or concern for septic arthritis.  The patient's labs are very reassuring without leukocytosis or other signs of sepsis.  His inflammatory markers interestingly are completely normal including procalcitonin.  Ultrasound was performed and negative for DVT.    I did not really feel like the patient's fever was explained by his cellulitis especially in the setting of normal inflammatory markers therefore COVID-19 test was sent and did return positive.  The patient had a chest x-ray that showed no signs of pneumonia or pulmonary edema.  He has no signs of respiratory distress or hypoxia.  Plan to reassess following antipyretics and fluids and  possibly discharge home.    Upon reassessment, patient is resting comfortably with normal vital signs.  No new complaints at this time.  Discussed results with patient and/or family as well as importance of primary care follow up.  He was informed of his positive Covid test and need for isolation for the next 2 weeks.  He will be discharged home with antibiotics for cellulitis with strict return precautions for worsening symptoms.  Patient understands plan of care and strict return precautions for new or changing symptoms.       IMPRESSION  (U07.1) COVID-19 virus infection  (L03.116) Cellulitis of left lower extremity  (L30.9) Eczema, unspecified type    Disposition: Discharge home, stable condition  Results, diagnoses, and treatment options were discussed with the patient and/or family. Patient verbalized understanding of plan of care.    Patient referred to primary care provider for monitoring and treatment of blood pressure.      Discharge Medication List as of 11/26/2020 12:48 AM      START taking these medications    Details   cephALEXin (KEFLEX) 500 MG Cap Take 2 Caps by mouth 3 times a day for 7 days., Disp-42 Cap, R-0, Print Rx Paper      doxycycline (MONODOX) 100 MG capsule Take 1 Cap by mouth 2 times a day for 7 days., Disp-14 Cap, R-0, Print Rx Paper      oxyCODONE immediate-release (ROXICODONE) 5 MG Tab Take 1 Tab by mouth every four hours as needed for Severe Pain for up to 3 days., Disp-8 Tab, R-0, Print Rx Paper      hydrOXYzine HCl (ATARAX) 25 MG Tab Take 1 Tab by mouth 3 times a day as needed for Itching., Disp-20 Tab, R-0, Print Rx Paper                 Electronically signed by: Delaney Mcneal M.D., 11/25/2020 10:36 PM

## 2020-11-28 LAB
BACTERIA UR CULT: NORMAL
SIGNIFICANT IND 70042: NORMAL
SITE SITE: NORMAL
SOURCE SOURCE: NORMAL

## 2020-11-30 LAB
BACTERIA BLD CULT: NORMAL
SIGNIFICANT IND 70042: NORMAL
SITE SITE: NORMAL
SOURCE SOURCE: NORMAL

## 2020-12-01 LAB
BACTERIA BLD CULT: NORMAL
SIGNIFICANT IND 70042: NORMAL
SITE SITE: NORMAL
SOURCE SOURCE: NORMAL

## 2020-12-12 ENCOUNTER — OFFICE VISIT (OUTPATIENT)
Dept: URGENT CARE | Facility: PHYSICIAN GROUP | Age: 55
End: 2020-12-12
Payer: COMMERCIAL

## 2020-12-12 VITALS
BODY MASS INDEX: 44.71 KG/M2 | DIASTOLIC BLOOD PRESSURE: 90 MMHG | HEIGHT: 68 IN | OXYGEN SATURATION: 93 % | TEMPERATURE: 97.8 F | WEIGHT: 295 LBS | RESPIRATION RATE: 18 BRPM | HEART RATE: 84 BPM | SYSTOLIC BLOOD PRESSURE: 144 MMHG

## 2020-12-12 DIAGNOSIS — H92.01 RIGHT EAR PAIN: ICD-10-CM

## 2020-12-12 DIAGNOSIS — L03.116 CELLULITIS OF LEFT LOWER EXTREMITY: ICD-10-CM

## 2020-12-12 PROCEDURE — 99214 OFFICE O/P EST MOD 30 MIN: CPT | Performed by: FAMILY MEDICINE

## 2020-12-12 RX ORDER — CEPHALEXIN 500 MG/1
CAPSULE ORAL
Qty: 40 CAP | Refills: 0 | Status: SHIPPED | OUTPATIENT
Start: 2020-12-12 | End: 2021-07-07

## 2020-12-12 RX ORDER — DOXYCYCLINE HYCLATE 100 MG
TABLET ORAL
Qty: 20 TAB | Refills: 0 | Status: SHIPPED | OUTPATIENT
Start: 2020-12-12 | End: 2021-07-07

## 2020-12-12 ASSESSMENT — FIBROSIS 4 INDEX: FIB4 SCORE: 1.14

## 2020-12-12 NOTE — PROGRESS NOTES
Chief Complaint:    Chief Complaint   Patient presents with   • Otalgia   • Other     Cellulitis left leg       History of Present Illness:    Patient has been dealing with cellulitis in left lower leg, was hospitalized for this end of October 2020, then went to ER for this on 11/25/2020. He reports most recently, his PCP had him on Doxycycline 100 mg BID and Cephalexin 500 mg - 2 pills BID. He finished these antibiotics 2 days ago and is concerned that there is still some redness in left lower leg. The problem markedly improves every time he is treated with antibiotics and he feels he may need continuation of antibiotics at this time.    He also started with some right ear pain in the past 24 hours, he took Ibuprofen for it this AM and feels the Ibuprofen is helping.      Review of Systems:    Constitutional: Negative for fever, chills, and diaphoresis.   Eyes: Negative for change in vision, photophobia, pain, redness, and discharge.  ENT: See HPI.    Respiratory: Negative for cough, hemoptysis, sputum production, shortness of breath, wheezing, and stridor.    Cardiovascular: Negative for chest pain, palpitations, orthopnea, claudication, leg swelling, and PND.   Gastrointestinal: Negative for abdominal pain, nausea, vomiting, diarrhea, constipation, blood in stool, and melena.   Genitourinary: Negative for dysuria, urinary urgency, urinary frequency, hematuria, and flank pain.   Musculoskeletal: See HPI  Skin: See HPI.  Neurological: Negative for dizziness, tingling, tremors, sensory change, speech change, focal weakness, seizures, loss of consciousness, and headaches.   Endo: Negative for polydipsia.   Heme: Does not bruise/bleed easily.   Psychiatric/Behavioral: No new symptoms.      Past Medical History:    Past Medical History:   Diagnosis Date   • Allergy    • ASTHMA    • Back pain    • BPH (benign prostatic hypertrophy)    • Breast mass     right side between 11 and 12 o'clock at nipple   • Cervical spine  degeneration    • Chest pain    • Difficulty breathing    • Earache    • ED (erectile dysfunction)    • High cholesterol    • Hypercholesteremia    • Hypertension    • Obesity    • Osteoarthritis    • Prediabetes    • Right arm pain    • Ringing in ears    • Sleep apnea     on CPAP   • Sweat, sweating, excessive    • Wears glasses      Past Surgical History:    Past Surgical History:   Procedure Laterality Date   • COLONOSCOPY - ENDO  2019    Procedure: COLONOSCOPY - AVERAGE RISK SCREENING;  Surgeon: Gopal Dinh M.D.;  Location: SURGERY [facility];  Service: Gastroenterology   • HARDWARE REMOVAL ORTHO Right     right hand   • CERVICAL DISK AND FUSION ANTERIOR      C5,6,7   • APPENDECTOMY     • OTHER ORTHOPEDIC SURGERY Left     L wrist, R hand-4th and 5th metacarpal   • LIGAMENT RECONSTRUCTION Left     ulna   • ANKLE ORIF Right    • HARDWARE REMOVAL ORTHO Right     right ankle   • TONSILLECTOMY     • VASECTOMY      x2     Social History:    Social History     Socioeconomic History   • Marital status:      Spouse name: Not on file   • Number of children: Not on file   • Years of education: Not on file   • Highest education level: Not on file   Occupational History     Employer: OTHER     Comment: pt is adopted and does not know family history   Social Needs   • Financial resource strain: Not on file   • Food insecurity     Worry: Not on file     Inability: Not on file   • Transportation needs     Medical: Not on file     Non-medical: Not on file   Tobacco Use   • Smoking status: Former Smoker     Packs/day: 0.50     Years: 0.50     Pack years: 0.25     Types: Cigarettes     Quit date: 1982     Years since quittin.8   • Smokeless tobacco: Never Used   Substance and Sexual Activity   • Alcohol use: Yes     Comment: 2 per week   • Drug use: No     Comment: hx of ephedirn for weight loss over 2 years ago   • Sexual activity: Yes     Partners: Female    Lifestyle   • Physical activity     Days per week: Not on file     Minutes per session: Not on file   • Stress: Not on file   Relationships   • Social connections     Talks on phone: Not on file     Gets together: Not on file     Attends Voodoo service: Not on file     Active member of club or organization: Not on file     Attends meetings of clubs or organizations: Not on file     Relationship status: Not on file   • Intimate partner violence     Fear of current or ex partner: Not on file     Emotionally abused: Not on file     Physically abused: Not on file     Forced sexual activity: Not on file   Other Topics Concern   • Not on file   Social History Narrative   • Not on file     Family History:    History reviewed. No pertinent family history.    Medications:    Current Outpatient Medications on File Prior to Visit   Medication Sig Dispense Refill   • hydrOXYzine HCl (ATARAX) 25 MG Tab Take 1 Tab by mouth 3 times a day as needed for Itching. 20 Tab 0   • acetaminophen (TYLENOL) 500 MG Tab Take 1-2 Tabs by mouth every 6 hours as needed (Mild Pain; (Pain scale 1-3); Temp greater than 100.5 F).     • fluticasone-salmeterol (ADVAIR) 100-50 MCG/DOSE AEROSOL POWDER, BREATH ACTIVATED Inhale 1 Puff by mouth every 12 hours. 1 Each 0   • esomeprazole (NEXIUM) 20 MG capsule Take 20 mg by mouth every day.     • albuterol 108 (90 Base) MCG/ACT Aero Soln inhalation aerosol Inhale 2 Puffs by mouth 1 time daily as needed.     • amLODIPine (NORVASC) 5 MG Tab Take 5 mg by mouth every day.     • fluticasone (FLONASE) 50 MCG/ACT nasal spray Spray 1 Spray in nose.     • tizanidine (ZANAFLEX) 4 MG Tab Take 4 mg by mouth at bedtime as needed.     • lisinopril (PRINIVIL) 20 MG Tab Take 20 mg by mouth every evening.     • fexofenadine (ALLEGRA) 180 MG tablet Take 180 mg by mouth 1 time daily as needed.     • fenofibrate (TRICOR) 145 MG Tab Take 145 mg by mouth every evening.     • ibuprofen (MOTRIN) 200 MG Tab Take 800 mg by mouth  "1 time daily as needed for Mild Pain.     • zolpidem (AMBIEN) 10 MG TABS Take 1 Tab by mouth at bedtime as needed for Sleep. 90 Each 3     No current facility-administered medications on file prior to visit.      Allergies:    No Known Allergies      Vitals:    Vitals:    12/12/20 1004   BP: 144/90   Pulse: 84   Resp: 18   Temp: 36.6 °C (97.8 °F)   TempSrc: Temporal   SpO2: 93%   Weight: (!) 133.8 kg (295 lb)   Height: 1.727 m (5' 8\")       Physical Exam:    Constitutional: Vital signs reviewed. Appears well-developed and well-nourished. No acute distress.   Eyes: Sclera white, conjunctivae clear.   ENT: External ears normal. External auditory canals normal without discharge. TMs translucent and non-bulging. Hearing normal. Nasal mucosa pink. Lips/teeth are normal. Oral mucosa pink and moist. Posterior pharynx: WNL.  Neck: Neck supple.   Pulmonary/Chest: Respirations non-labored. Clear to auscultation bilaterally.  Lymph: Cervical nodes without tenderness or enlargement.  Musculoskeletal: Normal gait. Normal range of motion. No tenderness to palpation. No muscular atrophy or weakness.  Neurological: Alert and oriented to person, place, and time. Muscle tone normal. Coordination normal.   Skin: Left lower leg has large area of light erythematous skin that is present anteriorly, laterally, and posteriorly.  Psychiatric: Normal mood and affect. Behavior is normal. Judgment and thought content normal.       Assessment / Plan:    1. Cellulitis of left lower extremity  - cephALEXin (KEFLEX) 500 MG Cap; 2 CAPS BY MOUTH TWICE A DAY X 7-10 DAYS  Dispense: 40 Cap; Refill: 0  - doxycycline (VIBRAMYCIN) 100 MG Tab; 1 TAB BY MOUTH TWICE A DAY X 7-10 DAYS  Dispense: 20 Tab; Refill: 0    2. Right ear pain      Discussed with him DDX, management options, and risks, benefits, and alternatives to treatment plan agreed upon.    Will continue him on the most recent antibiotics for his left lower leg cellulitis.    ? etiology of right " ear pain. Right ear exam is normal. He feels Ibuprofen taken this AM for this has helped some.    Recommended further observation of right ear pain and see if will self-resolve eventually.    May continue OTC Ibuprofen prn right ear pain.    Agreeable to medications prescribed.    Discussed expected course of duration, time for improvement, and to seek follow-up in Emergency Room, urgent care, or with PCP if getting worse at any time or not improving within expected time frame.

## 2021-01-03 ENCOUNTER — OFFICE VISIT (OUTPATIENT)
Dept: URGENT CARE | Facility: PHYSICIAN GROUP | Age: 56
End: 2021-01-03
Payer: COMMERCIAL

## 2021-01-03 VITALS
RESPIRATION RATE: 16 BRPM | OXYGEN SATURATION: 96 % | DIASTOLIC BLOOD PRESSURE: 76 MMHG | TEMPERATURE: 96.7 F | SYSTOLIC BLOOD PRESSURE: 133 MMHG | BODY MASS INDEX: 44.47 KG/M2 | HEIGHT: 68 IN | WEIGHT: 293.4 LBS | HEART RATE: 80 BPM

## 2021-01-03 DIAGNOSIS — L30.9 ECZEMA, UNSPECIFIED TYPE: ICD-10-CM

## 2021-01-03 DIAGNOSIS — L03.115 CELLULITIS OF RIGHT LOWER EXTREMITY: ICD-10-CM

## 2021-01-03 PROCEDURE — 99214 OFFICE O/P EST MOD 30 MIN: CPT | Performed by: PHYSICIAN ASSISTANT

## 2021-01-03 RX ORDER — DOXYCYCLINE 100 MG/1
100 TABLET ORAL 2 TIMES DAILY
Qty: 20 TAB | Refills: 0 | Status: SHIPPED | OUTPATIENT
Start: 2021-01-03 | End: 2021-07-07

## 2021-01-03 RX ORDER — TRIAMCINOLONE ACETONIDE 5 MG/G
CREAM TOPICAL
Qty: 454 G | Refills: 3 | Status: SHIPPED | OUTPATIENT
Start: 2021-01-03 | End: 2021-07-07

## 2021-01-03 RX ORDER — CEPHALEXIN 500 MG/1
500 CAPSULE ORAL 4 TIMES DAILY
Qty: 40 CAP | Refills: 0 | Status: SHIPPED | OUTPATIENT
Start: 2021-01-03 | End: 2021-01-13

## 2021-01-03 ASSESSMENT — FIBROSIS 4 INDEX: FIB4 SCORE: 1.14

## 2021-01-03 NOTE — PROGRESS NOTES
Chief Complaint   Patient presents with   • Orbital Cellulitis     located (R) leg, x72 hours. painful, red, swelling.        HISTORY OF PRESENT ILLNESS: Patient is a 55 y.o. male who presents today for the following:    Patient is here today for evaluation of his right lower leg (first episode).  He has pain, swelling, and redness.  He has a history of multiple episodes of cellulitis and has been hospitalized and evaluated in the emergency department for this in the past.  He had been on doxycycline and Keflex from his primary care provider last month.  The symptoms did not completely resolve and was given the same antibiotics 12/12 during an urgent care visit.  The most recent episode started about 72 hours ago. He restarted Keflex and doxycycline, leftover from last visit (was given extra). Fever, tmax 103, started 3 days ago. Persistent chills. OTC meds today: none.    Patient Active Problem List    Diagnosis Date Noted   • Cellulitis of left lower extremity 10/23/2020     Priority: High   • Prediabetes 10/24/2020     Priority: Medium   • Tinea pedis of both feet 02/03/2011     Priority: Medium   • GERD (gastroesophageal reflux disease) 06/15/2011     Priority: Low   • Insomnia 03/16/2010     Priority: Low   • Obesity      Priority: Low   • Obstructive sleep apnea      Priority: Low   • Psoriasis 10/23/2020   • Mild persistent asthma with acute exacerbation 05/21/2017   • BMI 45.0-49.9, adult ([facility]) 09/22/2015   • Bronchitis with bronchospasm 08/25/2015   • Knee pain 12/23/2014   • Asthma in adult 04/24/2014   • Elevated blood sugar 01/23/2014   • Leg pain, left 10/10/2013   • Vitamin d deficiency 01/10/2012   • ED (erectile dysfunction) 02/03/2011   • Environmental allergies 02/03/2011   • Hypertension    • Osteoarthritis    • Hypercholesteremia    • Cervical spine degeneration        Allergies:Patient has no known allergies.    Current Outpatient Medications Ordered in Epic   Medication Sig Dispense Refill   •  cephALEXin (KEFLEX) 500 MG Cap Take 1 Cap by mouth 4 times a day for 10 days. 40 Cap 0   • doxycycline monohydrate (ADOXA) 100 MG tablet Take 1 Tab by mouth 2 times a day. 20 Tab 0   • triamcinolone acetonide (KENALOG) 0.5 % Cream Apply to affected area up to 4 times daily as needed for eczema. Max use 14 days in a row. 454 g 3   • cephALEXin (KEFLEX) 500 MG Cap 2 CAPS BY MOUTH TWICE A DAY X 7-10 DAYS 40 Cap 0   • doxycycline (VIBRAMYCIN) 100 MG Tab 1 TAB BY MOUTH TWICE A DAY X 7-10 DAYS 20 Tab 0   • hydrOXYzine HCl (ATARAX) 25 MG Tab Take 1 Tab by mouth 3 times a day as needed for Itching. 20 Tab 0   • fluticasone-salmeterol (ADVAIR) 100-50 MCG/DOSE AEROSOL POWDER, BREATH ACTIVATED Inhale 1 Puff by mouth every 12 hours. 1 Each 0   • esomeprazole (NEXIUM) 20 MG capsule Take 20 mg by mouth every day.     • albuterol 108 (90 Base) MCG/ACT Aero Soln inhalation aerosol Inhale 2 Puffs by mouth 1 time daily as needed.     • amLODIPine (NORVASC) 5 MG Tab Take 5 mg by mouth every day.     • fluticasone (FLONASE) 50 MCG/ACT nasal spray Spray 1 Spray in nose.     • tizanidine (ZANAFLEX) 4 MG Tab Take 4 mg by mouth at bedtime as needed.     • lisinopril (PRINIVIL) 20 MG Tab Take 20 mg by mouth every evening.     • fexofenadine (ALLEGRA) 180 MG tablet Take 180 mg by mouth 1 time daily as needed.     • fenofibrate (TRICOR) 145 MG Tab Take 145 mg by mouth every evening.     • ibuprofen (MOTRIN) 200 MG Tab Take 800 mg by mouth 1 time daily as needed for Mild Pain.     • zolpidem (AMBIEN) 10 MG TABS Take 1 Tab by mouth at bedtime as needed for Sleep. 90 Each 3   • acetaminophen (TYLENOL) 500 MG Tab Take 1-2 Tabs by mouth every 6 hours as needed (Mild Pain; (Pain scale 1-3); Temp greater than 100.5 F).       No current [facility]-ordered facility-administered medications on file.        Past Medical History:   Diagnosis Date   • Allergy    • ASTHMA    • Back pain    • BPH (benign prostatic hypertrophy)    • Breast mass     right side  "between 11 and 12 o'clock at nipple   • Cervical spine degeneration    • Chest pain    • Difficulty breathing    • Earache    • ED (erectile dysfunction)    • High cholesterol    • Hypercholesteremia    • Hypertension    • Obesity    • Osteoarthritis    • Prediabetes    • Right arm pain    • Ringing in ears    • Sleep apnea     on CPAP   • Sweat, sweating, excessive    • Wears glasses        Social History     Tobacco Use   • Smoking status: Former Smoker     Packs/day: 0.50     Years: 0.50     Pack years: 0.25     Types: Cigarettes     Quit date: 1982     Years since quittin.9   • Smokeless tobacco: Never Used   Substance Use Topics   • Alcohol use: Yes     Comment: 2 per week   • Drug use: No     Comment: hx of ephedirn for weight loss over 2 years ago       Family Status   Relation Name Status   • Mo  Other        pt is adopted   • Fa  Other        pt is adopted   • Jan  Alive   • Jan  Alive   History reviewed. No pertinent family history.    Review of Systems:    Constitutional ROS: No unexpected change in weight, No weakness, No fatigue  Pulmonary ROS: No chronic cough, sputum, or hemoptysis, No dyspnea on exertion, No wheezing  Cardiovascular ROS: No diaphoresis, No edema, No palpitations  Musculoskeletal/Extremities ROS: Right lower leg pain and swelling.  Hematologic/Lymphatic ROS: No chills, No night sweats, No weight loss  Skin/Integumentary ROS: No edema, No evidence of rash, No itching      Exam:  /76   Pulse 80   Temp 35.9 °C (96.7 °F) (Temporal)   Resp 16   Ht 1.727 m (5' 8\")   Wt (!) 133.1 kg (293 lb 6.4 oz)   SpO2 96%   General: Well developed, well nourished. No distress.    HENT: Head is grossly normal.  Pulmonary: Unlabored respiratory effort.   Neurologic: Grossly nonfocal. No facial asymmetry noted.  Musculoskeletal: Multiple crust noted on the right lower leg, primarily on the distal aspect.  There is associated erythema, edema, warmth, and tenderness.  Tenderness extends " to the knee and into the medial aspect of the distal right upper leg, although the erythema does not extend other location.  Skin: Warm, dry, good turgor. No rashes in visible areas.   Psych: Normal mood. Alert and oriented to person, place and time.    Assessment/Plan:  Continue on Keflex and doxycycline as it seems to be improving.  Patient will take a total of 1 weeks worth of medication, extending as needed.  Understanding patient will have some leftover medication at the end of this course.  Deferring Rocephin injection for now but may need to consider this over the next few days if symptoms do not continue to improve.  Suspect part of patient's problem is the chronic eczema/skin cracking.  Refilling patient's triamcinolone to help keep the eczema controlled.  Discussed appropriate over-the-counter symptomatic medication, and when to return to clinic. Follow up for worsening or persistent symptoms.  1. Cellulitis of right lower extremity  cephALEXin (KEFLEX) 500 MG Cap    doxycycline monohydrate (ADOXA) 100 MG tablet   2. Eczema, unspecified type  triamcinolone acetonide (KENALOG) 0.5 % Cream

## 2021-02-04 ENCOUNTER — OFFICE VISIT (OUTPATIENT)
Dept: URGENT CARE | Facility: PHYSICIAN GROUP | Age: 56
End: 2021-02-04
Payer: COMMERCIAL

## 2021-02-04 VITALS
DIASTOLIC BLOOD PRESSURE: 82 MMHG | RESPIRATION RATE: 20 BRPM | SYSTOLIC BLOOD PRESSURE: 140 MMHG | HEART RATE: 113 BPM | OXYGEN SATURATION: 98 % | HEIGHT: 68 IN | WEIGHT: 298 LBS | TEMPERATURE: 98.9 F | BODY MASS INDEX: 45.16 KG/M2

## 2021-02-04 DIAGNOSIS — L03.115 CELLULITIS OF RIGHT LOWER EXTREMITY: ICD-10-CM

## 2021-02-04 PROCEDURE — 99213 OFFICE O/P EST LOW 20 MIN: CPT | Performed by: NURSE PRACTITIONER

## 2021-02-04 RX ORDER — AMOXICILLIN AND CLAVULANATE POTASSIUM 875; 125 MG/1; MG/1
1 TABLET, FILM COATED ORAL 2 TIMES DAILY
Qty: 28 TAB | Refills: 0 | Status: SHIPPED | OUTPATIENT
Start: 2021-02-04 | End: 2021-02-18

## 2021-02-04 RX ORDER — SULFAMETHOXAZOLE AND TRIMETHOPRIM 800; 160 MG/1; MG/1
1 TABLET ORAL 2 TIMES DAILY
Qty: 28 TAB | Refills: 0 | Status: SHIPPED | OUTPATIENT
Start: 2021-02-04 | End: 2021-02-18

## 2021-02-04 ASSESSMENT — FIBROSIS 4 INDEX: FIB4 SCORE: 1.14

## 2021-02-05 ASSESSMENT — ENCOUNTER SYMPTOMS
TINGLING: 0
HEADACHES: 0
CHILLS: 1
FEVER: 0
SENSORY CHANGE: 0
ROS SKIN COMMENTS: ERYTHEMA
MYALGIAS: 0
NAUSEA: 0

## 2021-02-05 NOTE — PROGRESS NOTES
Subjective:      French Hurd is a 55 y.o. male who presents with Leg Pain (redness thinks his cellulitis is coming back) and Chills    Cc: possible cellulitis, right leg.          HPI New. 55 year old male with right leg redness and swelling for one day. He has had several bouts of cellulitis to this extremity treated successfully with duo antibiotic therapy. He reports chills but denies fever, myalgia, nausea or headache. He has taken ibuprofen only for this.     Patient has no known allergies.  Current Outpatient Medications on File Prior to Visit   Medication Sig Dispense Refill   • triamcinolone acetonide (KENALOG) 0.5 % Cream Apply to affected area up to 4 times daily as needed for eczema. Max use 14 days in a row. 454 g 3   • hydrOXYzine HCl (ATARAX) 25 MG Tab Take 1 Tab by mouth 3 times a day as needed for Itching. 20 Tab 0   • fluticasone-salmeterol (ADVAIR) 100-50 MCG/DOSE AEROSOL POWDER, BREATH ACTIVATED Inhale 1 Puff by mouth every 12 hours. 1 Each 0   • esomeprazole (NEXIUM) 20 MG capsule Take 20 mg by mouth every day.     • albuterol 108 (90 Base) MCG/ACT Aero Soln inhalation aerosol Inhale 2 Puffs by mouth 1 time daily as needed.     • amLODIPine (NORVASC) 5 MG Tab Take 5 mg by mouth every day.     • fluticasone (FLONASE) 50 MCG/ACT nasal spray Spray 1 Spray in nose.     • tizanidine (ZANAFLEX) 4 MG Tab Take 4 mg by mouth at bedtime as needed.     • lisinopril (PRINIVIL) 20 MG Tab Take 20 mg by mouth every evening.     • fexofenadine (ALLEGRA) 180 MG tablet Take 180 mg by mouth 1 time daily as needed.     • fenofibrate (TRICOR) 145 MG Tab Take 145 mg by mouth every evening.     • ibuprofen (MOTRIN) 200 MG Tab Take 800 mg by mouth 1 time daily as needed for Mild Pain.     • zolpidem (AMBIEN) 10 MG TABS Take 1 Tab by mouth at bedtime as needed for Sleep. 90 Each 3   • doxycycline monohydrate (ADOXA) 100 MG tablet Take 1 Tab by mouth 2 times a day. (Patient not taking: Reported on 2/4/2021) 20 Tab  0   • cephALEXin (KEFLEX) 500 MG Cap 2 CAPS BY MOUTH TWICE A DAY X 7-10 DAYS (Patient not taking: Reported on 2021) 40 Cap 0   • doxycycline (VIBRAMYCIN) 100 MG Tab 1 TAB BY MOUTH TWICE A DAY X 7-10 DAYS (Patient not taking: Reported on 2021) 20 Tab 0   • acetaminophen (TYLENOL) 500 MG Tab Take 1-2 Tabs by mouth every 6 hours as needed (Mild Pain; (Pain scale 1-3); Temp greater than 100.5 F).       No current facility-administered medications on file prior to visit.      Social History     Socioeconomic History   • Marital status:      Spouse name: Not on file   • Number of children: Not on file   • Years of education: Not on file   • Highest education level: Not on file   Occupational History     Employer: OTHER     Comment: pt is adopted and does not know family history   Social Needs   • Financial resource strain: Not on file   • Food insecurity     Worry: Not on file     Inability: Not on file   • Transportation needs     Medical: Not on file     Non-medical: Not on file   Tobacco Use   • Smoking status: Former Smoker     Packs/day: 0.50     Years: 0.50     Pack years: 0.25     Types: Cigarettes     Quit date: 1982     Years since quittin.0   • Smokeless tobacco: Never Used   Substance and Sexual Activity   • Alcohol use: Yes     Comment: 2 per week   • Drug use: No     Comment: hx of ephedirn for weight loss over 2 years ago   • Sexual activity: Yes     Partners: Female   Lifestyle   • Physical activity     Days per week: Not on file     Minutes per session: Not on file   • Stress: Not on file   Relationships   • Social connections     Talks on phone: Not on file     Gets together: Not on file     Attends Latter-day service: Not on file     Active member of club or organization: Not on file     Attends meetings of clubs or organizations: Not on file     Relationship status: Not on file   • Intimate partner violence     Fear of current or ex partner: Not on file     Emotionally abused:  "Not on file     Physically abused: Not on file     Forced sexual activity: Not on file   Other Topics Concern   • Not on file   Social History Narrative   • Not on file     Breast Cancer-related family history is not on file.      Review of Systems   Constitutional: Positive for chills. Negative for fever and malaise/fatigue.   Gastrointestinal: Negative for nausea.   Musculoskeletal: Negative for joint pain and myalgias.   Skin:        erythema   Neurological: Negative for tingling, sensory change and headaches.          Objective:     /82   Pulse (!) 113   Temp 37.2 °C (98.9 °F) (Temporal)   Resp 20   Ht 1.727 m (5' 8\")   Wt (!) 135 kg (298 lb)   SpO2 98%   BMI 45.31 kg/m²      Physical Exam  Constitutional:       Appearance: Normal appearance. He is obese. He is not ill-appearing.   Cardiovascular:      Rate and Rhythm: Regular rhythm. Tachycardia present.      Heart sounds: No murmur.   Pulmonary:      Effort: Pulmonary effort is normal.      Breath sounds: Normal breath sounds.   Musculoskeletal: Normal range of motion.      Right lower leg: He exhibits tenderness and swelling.        Legs:    Skin:     General: Skin is warm and dry.      Findings: Erythema present.   Neurological:      General: No focal deficit present.      Mental Status: He is alert and oriented to person, place, and time.                 Assessment/Plan:        1. Cellulitis of right lower extremity  sulfamethoxazole-trimethoprim (BACTRIM DS) 800-160 MG tablet    amoxicillin-clavulanate (AUGMENTIN) 875-125 MG Tab     Reviewed strict ED precautions with patient and he has voiced understanding of these.  May continue otc pain relievers.  Differential diagnosis, natural history, supportive care, and indications for immediate follow-up discussed at length.       "

## 2021-04-20 ENCOUNTER — HOSPITAL ENCOUNTER (OUTPATIENT)
Dept: LAB | Facility: MEDICAL CENTER | Age: 56
End: 2021-04-20
Attending: FAMILY MEDICINE
Payer: COMMERCIAL

## 2021-04-20 PROCEDURE — 84153 ASSAY OF PSA TOTAL: CPT

## 2021-04-20 PROCEDURE — 85025 COMPLETE CBC W/AUTO DIFF WBC: CPT

## 2021-04-20 PROCEDURE — 80053 COMPREHEN METABOLIC PANEL: CPT

## 2021-04-20 PROCEDURE — 36415 COLL VENOUS BLD VENIPUNCTURE: CPT

## 2021-04-20 PROCEDURE — 83036 HEMOGLOBIN GLYCOSYLATED A1C: CPT

## 2021-04-20 PROCEDURE — 84443 ASSAY THYROID STIM HORMONE: CPT

## 2021-04-20 PROCEDURE — 80061 LIPID PANEL: CPT

## 2021-04-21 LAB
ALBUMIN SERPL BCP-MCNC: 4.2 G/DL (ref 3.2–4.9)
ALBUMIN/GLOB SERPL: 1.4 G/DL
ALP SERPL-CCNC: 66 U/L (ref 30–99)
ALT SERPL-CCNC: 32 U/L (ref 2–50)
ANION GAP SERPL CALC-SCNC: 8 MMOL/L (ref 7–16)
AST SERPL-CCNC: 35 U/L (ref 12–45)
BASOPHILS # BLD AUTO: 0.7 % (ref 0–1.8)
BASOPHILS # BLD: 0.05 K/UL (ref 0–0.12)
BILIRUB SERPL-MCNC: 0.6 MG/DL (ref 0.1–1.5)
BUN SERPL-MCNC: 12 MG/DL (ref 8–22)
CALCIUM SERPL-MCNC: 8.8 MG/DL (ref 8.5–10.5)
CHLORIDE SERPL-SCNC: 104 MMOL/L (ref 96–112)
CHOLEST SERPL-MCNC: 186 MG/DL (ref 100–199)
CO2 SERPL-SCNC: 24 MMOL/L (ref 20–33)
CREAT SERPL-MCNC: 0.74 MG/DL (ref 0.5–1.4)
EOSINOPHIL # BLD AUTO: 0.26 K/UL (ref 0–0.51)
EOSINOPHIL NFR BLD: 3.9 % (ref 0–6.9)
ERYTHROCYTE [DISTWIDTH] IN BLOOD BY AUTOMATED COUNT: 44.4 FL (ref 35.9–50)
EST. AVERAGE GLUCOSE BLD GHB EST-MCNC: 120 MG/DL
FASTING STATUS PATIENT QL REPORTED: NORMAL
GLOBULIN SER CALC-MCNC: 3.1 G/DL (ref 1.9–3.5)
GLUCOSE SERPL-MCNC: 111 MG/DL (ref 65–99)
HBA1C MFR BLD: 5.8 % (ref 4–5.6)
HCT VFR BLD AUTO: 46 % (ref 42–52)
HDLC SERPL-MCNC: 34 MG/DL
HGB BLD-MCNC: 15.8 G/DL (ref 14–18)
IMM GRANULOCYTES # BLD AUTO: 0.03 K/UL (ref 0–0.11)
IMM GRANULOCYTES NFR BLD AUTO: 0.4 % (ref 0–0.9)
LDLC SERPL CALC-MCNC: 126 MG/DL
LYMPHOCYTES # BLD AUTO: 1.83 K/UL (ref 1–4.8)
LYMPHOCYTES NFR BLD: 27.4 % (ref 22–41)
MCH RBC QN AUTO: 30.7 PG (ref 27–33)
MCHC RBC AUTO-ENTMCNC: 34.3 G/DL (ref 33.7–35.3)
MCV RBC AUTO: 89.5 FL (ref 81.4–97.8)
MONOCYTES # BLD AUTO: 0.73 K/UL (ref 0–0.85)
MONOCYTES NFR BLD AUTO: 10.9 % (ref 0–13.4)
NEUTROPHILS # BLD AUTO: 3.79 K/UL (ref 1.82–7.42)
NEUTROPHILS NFR BLD: 56.7 % (ref 44–72)
NRBC # BLD AUTO: 0 K/UL
NRBC BLD-RTO: 0 /100 WBC
PLATELET # BLD AUTO: 222 K/UL (ref 164–446)
PMV BLD AUTO: 11.6 FL (ref 9–12.9)
POTASSIUM SERPL-SCNC: 4.1 MMOL/L (ref 3.6–5.5)
PROT SERPL-MCNC: 7.3 G/DL (ref 6–8.2)
PSA SERPL-MCNC: 0.58 NG/ML (ref 0–4)
RBC # BLD AUTO: 5.14 M/UL (ref 4.7–6.1)
SODIUM SERPL-SCNC: 136 MMOL/L (ref 135–145)
TRIGL SERPL-MCNC: 128 MG/DL (ref 0–149)
TSH SERPL DL<=0.005 MIU/L-ACNC: 1.35 UIU/ML (ref 0.38–5.33)
WBC # BLD AUTO: 6.7 K/UL (ref 4.8–10.8)

## 2021-04-25 DIAGNOSIS — J45.901 EXACERBATION OF ASTHMA, UNSPECIFIED ASTHMA SEVERITY, UNSPECIFIED WHETHER PERSISTENT: ICD-10-CM

## 2021-04-25 RX ORDER — DOXYCYCLINE HYCLATE 100 MG
100 TABLET ORAL 2 TIMES DAILY
Qty: 14 TABLET | Refills: 0 | Status: SHIPPED | OUTPATIENT
Start: 2021-04-25 | End: 2021-07-07

## 2021-04-25 RX ORDER — PREDNISONE 10 MG/1
TABLET ORAL
Qty: 1 QUANTITY SUFFICIENT | Refills: 0 | Status: SHIPPED | OUTPATIENT
Start: 2021-04-25 | End: 2021-07-07

## 2021-04-25 NOTE — PROGRESS NOTES
Started to have an asthma exacerbation.  Denies fever.  Sending steroids and contingent antibiotics.

## 2021-06-10 DIAGNOSIS — J45.901 EXACERBATION OF ASTHMA, UNSPECIFIED ASTHMA SEVERITY, UNSPECIFIED WHETHER PERSISTENT: ICD-10-CM

## 2021-06-10 RX ORDER — PREDNISONE 10 MG/1
TABLET ORAL
Qty: 15 TABLET | Refills: 0 | Status: SHIPPED | OUTPATIENT
Start: 2021-06-10 | End: 2021-07-07

## 2021-06-10 RX ORDER — MONTELUKAST SODIUM 10 MG/1
10 TABLET ORAL DAILY
Qty: 90 TABLET | Refills: 3 | Status: SHIPPED | OUTPATIENT
Start: 2021-06-10 | End: 2022-10-19 | Stop reason: SDUPTHER

## 2021-06-10 NOTE — PROGRESS NOTES
Using albuterol inhaler daily, multiple times. Start using Advair daily. Starting Singulair. Starting steroid burst. Sees pulm 7/7. No fever, chills, body aches.

## 2021-07-07 ENCOUNTER — OFFICE VISIT (OUTPATIENT)
Dept: SLEEP MEDICINE | Facility: MEDICAL CENTER | Age: 56
End: 2021-07-07
Payer: COMMERCIAL

## 2021-07-07 VITALS
TEMPERATURE: 97.7 F | BODY MASS INDEX: 47.29 KG/M2 | HEIGHT: 68 IN | WEIGHT: 312 LBS | DIASTOLIC BLOOD PRESSURE: 68 MMHG | OXYGEN SATURATION: 96 % | SYSTOLIC BLOOD PRESSURE: 132 MMHG | RESPIRATION RATE: 18 BRPM | HEART RATE: 75 BPM

## 2021-07-07 DIAGNOSIS — J45.40 MODERATE PERSISTENT ASTHMA WITHOUT COMPLICATION: ICD-10-CM

## 2021-07-07 DIAGNOSIS — E66.01 CLASS 3 SEVERE OBESITY WITH BODY MASS INDEX (BMI) OF 45.0 TO 49.9 IN ADULT, UNSPECIFIED OBESITY TYPE, UNSPECIFIED WHETHER SERIOUS COMORBIDITY PRESENT (HCC): ICD-10-CM

## 2021-07-07 DIAGNOSIS — G47.33 OSA (OBSTRUCTIVE SLEEP APNEA): ICD-10-CM

## 2021-07-07 PROCEDURE — 99204 OFFICE O/P NEW MOD 45 MIN: CPT | Performed by: INTERNAL MEDICINE

## 2021-07-07 RX ORDER — LEVOCETIRIZINE DIHYDROCHLORIDE 5 MG/1
TABLET, FILM COATED ORAL
COMMUNITY

## 2021-07-07 RX ORDER — LOSARTAN POTASSIUM 100 MG/1
100 TABLET ORAL DAILY
COMMUNITY
End: 2024-01-18 | Stop reason: SDUPTHER

## 2021-07-07 ASSESSMENT — ENCOUNTER SYMPTOMS
PALPITATIONS: 0
FALLS: 0
BACK PAIN: 0
SINUS PAIN: 0
DIZZINESS: 0
ABDOMINAL PAIN: 0
FOCAL WEAKNESS: 0
EYE DISCHARGE: 0
VOMITING: 0
ORTHOPNEA: 0
SPEECH CHANGE: 0
EYE PAIN: 0
PND: 0
NECK PAIN: 0
CHILLS: 0
TREMORS: 0
STRIDOR: 0
BLURRED VISION: 0
PHOTOPHOBIA: 0
CONSTIPATION: 0
DEPRESSION: 0
SHORTNESS OF BREATH: 0
COUGH: 0
HEMOPTYSIS: 0
SPUTUM PRODUCTION: 0
EYE REDNESS: 0
DOUBLE VISION: 0
FEVER: 0
HEADACHES: 0
WEIGHT LOSS: 0
SORE THROAT: 0
MYALGIAS: 0
DIARRHEA: 0
WHEEZING: 0
DIAPHORESIS: 0
NAUSEA: 0
HEARTBURN: 0
CLAUDICATION: 0
WEAKNESS: 0

## 2021-07-07 ASSESSMENT — FIBROSIS 4 INDEX: FIB4 SCORE: 1.48

## 2021-07-07 NOTE — PROGRESS NOTES
Chief Complaint   Patient presents with   • Establish Care     10/3/2020 C. Hamman, Aprn, 3/30/21 YAYA Wilcox, DO DX SOB, Asthma   • Asthma     last seen 8/16/16 sharon Mccabe    • Results     CXR 11/25/2020, ER 11/25/20-11/26/2020        HPI: This patient is a 55 y.o. male presenting for evaluation of asthma and JOSSY.  The patient's past medical history is significant for asthma since childhood which was typically triggered by exercise and environmental allergies.  He reports spring and fall being typical times when symptoms would flare and he would often require treatment with antibiotics and steroids.  He does report being hospitalized in the past but not for greater than 20 years.  He did for acute exacerbation in the last 60 days.  He tells me it is odd for him to need 2 treatments in the spring but he did this year.  Currently symptoms are at baseline with albuterol use less than once per week.  Surgical history includes C-spine fusion and multiple orthopedic surgeries.  He has less than 5-pack-year tobacco history and quit in the 1980s.  He currently works for the [employer] and does shift work.  He does have obstructive sleep apnea and [facility] at which point CPAP pressure was increased from 10 cm water to 18 cm of water.  He has used his machine reliably but has not obtained a new machine in the past 5 years.  He was adopted and therefore only recently learned his past medical history.  Biological sister with asthma and biologic father suffered from cancer thought to be exposure related.  Patient presents today to reestablish care.  No pulmonary function testing in our system.  He does have a chest x-ray from November that was unremarkable.  He was in the ER for cellulitis at that time and did test positive for coronavirus although never had any symptoms of active infection.    Past Medical History:   Diagnosis Date   • Allergy    • ASTHMA    • Back pain     • BPH (benign prostatic hypertrophy)    • Breast mass     right side between 11 and 12 o'clock at nipple   • Cervical spine degeneration    • Chest pain    • Difficulty breathing    • Earache    • ED (erectile dysfunction)    • High cholesterol    • Hypercholesteremia    • Hypertension    • Obesity    • Osteoarthritis    • Prediabetes    • Right arm pain    • Ringing in ears    • Sleep apnea     on CPAP   • Sweat, sweating, excessive    • Wears glasses        Social History     Socioeconomic History   • Marital status:      Spouse name: Not on file   • Number of children: Not on file   • Years of education: Not on file   • Highest education level: Not on file   Occupational History     Employer: OTHER     Comment: pt is adopted and does not know family history   Tobacco Use   • Smoking status: Former Smoker     Packs/day: 0.50     Years: 0.50     Pack years: 0.25     Types: Cigarettes     Quit date: 1982     Years since quittin.4   • Smokeless tobacco: Never Used   Vaping Use   • Vaping Use: Never used   Substance and Sexual Activity   • Alcohol use: Yes     Alcohol/week: 1.8 oz     Types: 3 Standard drinks or equivalent per week   • Drug use: No     Comment: hx of ephedirn for weight loss over 2 years ago   • Sexual activity: Yes     Partners: Female   Other Topics Concern   • Not on file   Social History Narrative   • Not on file     Social Determinants of Health     Financial Resource Strain:    • Difficulty of Paying Living Expenses:    Food Insecurity:    • Worried About Running Out of Food in the Last Year:    • Ran Out of Food in the Last Year:    Transportation Needs:    • Lack of Transportation (Medical):    • Lack of Transportation (Non-Medical):    Physical Activity:    • Days of Exercise per Week:    • Minutes of Exercise per Session:    Stress:    • Feeling of Stress :    Social Connections:    • Frequency of Communication with Friends and Family:    • Frequency of Social Gatherings  with Friends and Family:    • Attends [employer] Services:    • Active Member of Clubs or Organizations:    • Attends Club or Organization Meetings:    • Marital Status:    Intimate Partner Violence:    • Fear of Current or Ex-Partner:    • Emotionally Abused:    • Physically Abused:    • Sexually Abused:        History reviewed. No pertinent family history.    Current Outpatient Medications on File Prior to Visit   Medication Sig Dispense Refill   • losartan (COZAAR) 100 MG Tab Take 100 mg by mouth every day.     • Levocetirizine Dihydrochloride 5 MG Tab Take  by mouth.     • montelukast (SINGULAIR) 10 MG Tab Take 1 tablet by mouth every day. 90 tablet 3   • hydrOXYzine HCl (ATARAX) 25 MG Tab Take 1 Tab by mouth 3 times a day as needed for Itching. 20 Tab 0   • acetaminophen (TYLENOL) 500 MG Tab Take 1-2 Tabs by mouth every 6 hours as needed (Mild Pain; (Pain scale 1-3); Temp greater than 100.5 F).     • fluticasone-salmeterol (ADVAIR) 100-50 MCG/DOSE AEROSOL POWDER, BREATH ACTIVATED Inhale 1 Puff by mouth every 12 hours. 1 Each 0   • esomeprazole (NEXIUM) 20 MG capsule Take 40 mg by mouth every day.     • albuterol 108 (90 Base) MCG/ACT Aero Soln inhalation aerosol Inhale 2 Puffs by mouth 1 time daily as needed.     • amLODIPine (NORVASC) 5 MG Tab Take 5 mg by mouth every day.     • tizanidine (ZANAFLEX) 4 MG Tab Take 4 mg by mouth at bedtime as needed.     • fexofenadine (ALLEGRA) 180 MG tablet Take 180 mg by mouth 1 time daily as needed.     • fenofibrate (TRICOR) 145 MG Tab Take 145 mg by mouth every evening.     • ibuprofen (MOTRIN) 200 MG Tab Take 800 mg by mouth 1 time daily as needed for Mild Pain.     • zolpidem (AMBIEN) 10 MG TABS Take 1 Tab by mouth at bedtime as needed for Sleep. 90 Each 3     No current facility-administered medications on file prior to visit.       Allergies: Patient has no known allergies.    ROS:   Review of Systems   Constitutional: Negative for chills, diaphoresis, fever,  "malaise/fatigue and weight loss.   HENT: Negative for congestion, ear discharge, ear pain, hearing loss, nosebleeds, sinus pain, sore throat and tinnitus.    Eyes: Negative for blurred vision, double vision, photophobia, pain, discharge and redness.   Respiratory: Negative for cough, hemoptysis, sputum production, shortness of breath, wheezing and stridor.    Cardiovascular: Negative for chest pain, palpitations, orthopnea, claudication, leg swelling and PND.   Gastrointestinal: Negative for abdominal pain, constipation, diarrhea, heartburn, nausea and vomiting.   Genitourinary: Negative for dysuria and urgency.   Musculoskeletal: Negative for back pain, falls, joint pain, myalgias and neck pain.   Skin: Negative for itching and rash.   Neurological: Negative for dizziness, tremors, speech change, focal weakness, weakness and headaches.   Endo/Heme/Allergies: Negative for environmental allergies.   Psychiatric/Behavioral: Negative for depression.       /68 (BP Location: Right arm, Patient Position: Sitting, BP Cuff Size: Large adult)   Pulse 75   Temp 36.5 °C (97.7 °F) (Temporal)   Resp 18   Ht 1.727 m (5' 8\")   Wt (!) 142 kg (312 lb)   SpO2 96%     Physical Exam:  Physical Exam  Vitals reviewed.   Constitutional:       General: He is not in acute distress.     Appearance: Normal appearance. He is obese.   HENT:      Head: Normocephalic and atraumatic.      Right Ear: External ear normal.      Left Ear: External ear normal.      Nose: Nose normal. No congestion.      Mouth/Throat:      Mouth: Mucous membranes are moist.      Pharynx: Oropharynx is clear. No oropharyngeal exudate.   Eyes:      General: No scleral icterus.     Extraocular Movements: Extraocular movements intact.      Conjunctiva/sclera: Conjunctivae normal.      Pupils: Pupils are equal, round, and reactive to light.   Cardiovascular:      Rate and Rhythm: Normal rate and regular rhythm.      Pulses: Normal pulses.      Heart sounds: " Normal heart sounds. No murmur heard.   No gallop.    Pulmonary:      Effort: No respiratory distress.      Breath sounds: Normal breath sounds. No wheezing.   Abdominal:      Palpations: Abdomen is soft.      Comments: obese   Musculoskeletal:         General: Normal range of motion.      Cervical back: Normal range of motion and neck supple.      Right lower leg: No edema.      Left lower leg: No edema.   Skin:     General: Skin is warm and dry.      Findings: No rash.   Neurological:      Mental Status: He is alert and oriented to person, place, and time.      Cranial Nerves: No cranial nerve deficit.   Psychiatric:         Mood and Affect: Mood normal.         Behavior: Behavior normal.         PFTs as reviewed by me personally: none    Imaging as reviewed by me personally: as per HPI    Assessment:  1. Moderate persistent asthma without complication  PULMONARY FUNCTION TESTS -Test requested: Complete Pulmonary Function Test   2. JOSSY (obstructive sleep apnea)  Overnight Oximetry    CANCELED: REFERRAL TO PULMONARY AND SLEEP MEDICINE   3. Class 3 severe obesity with body mass index (BMI) of 45.0 to 49.9 in adult, unspecified obesity type, unspecified whether serious comorbidity present (HCC)         Plan:  1.  This is chronic and mild to moderate based on the fact that he needs treatment for acute exacerbation 2 times per year.  Control is otherwise good during the week.  I did not make any changes to his medication regimen of Advair 100, albuterol and montelukast today.  I would however like to see him back in the fall.  If he requires treatment for another exacerbation between now and then, I would recommend increasing his inhaled corticosteroid dose.  I have ordered baseline PFTs.  He is up-to-date on vaccines.  2.  This is chronic and patient is 22 pounds heavier than when last seen in 2016.  He does not have his chip today for me to review and is scheduled to see sleep medicine in October.  In the meantime I  have ordered overnight oximetry on CPAP therapy.  3.  This does put patient at increased risk for obesity related pulmonary complications.  Encouraged healthy vaginal habits.  Return in about 4 months (around 11/7/2021) for PFTs.

## 2021-07-22 ENCOUNTER — HOSPITAL ENCOUNTER (OUTPATIENT)
Dept: RADIOLOGY | Facility: MEDICAL CENTER | Age: 56
End: 2021-07-22
Attending: FAMILY MEDICINE
Payer: COMMERCIAL

## 2021-07-22 ENCOUNTER — TELEPHONE (OUTPATIENT)
Dept: URGENT CARE | Facility: PHYSICIAN GROUP | Age: 56
End: 2021-07-22

## 2021-07-22 ENCOUNTER — OFFICE VISIT (OUTPATIENT)
Dept: URGENT CARE | Facility: PHYSICIAN GROUP | Age: 56
End: 2021-07-22
Payer: COMMERCIAL

## 2021-07-22 VITALS
HEART RATE: 77 BPM | SYSTOLIC BLOOD PRESSURE: 148 MMHG | RESPIRATION RATE: 18 BRPM | WEIGHT: 315 LBS | TEMPERATURE: 97.9 F | OXYGEN SATURATION: 97 % | HEIGHT: 68 IN | BODY MASS INDEX: 47.74 KG/M2 | DIASTOLIC BLOOD PRESSURE: 86 MMHG

## 2021-07-22 DIAGNOSIS — M79.89 LEG SWELLING: ICD-10-CM

## 2021-07-22 DIAGNOSIS — L08.9 SKIN INFECTION: ICD-10-CM

## 2021-07-22 PROCEDURE — 93971 EXTREMITY STUDY: CPT | Mod: RT

## 2021-07-22 PROCEDURE — 99214 OFFICE O/P EST MOD 30 MIN: CPT | Performed by: FAMILY MEDICINE

## 2021-07-22 RX ORDER — CEPHALEXIN 500 MG/1
500 CAPSULE ORAL 4 TIMES DAILY
Qty: 28 CAPSULE | Refills: 0 | Status: SHIPPED | OUTPATIENT
Start: 2021-07-22 | End: 2021-07-29

## 2021-07-22 ASSESSMENT — FIBROSIS 4 INDEX: FIB4 SCORE: 1.48

## 2021-07-22 NOTE — LETTER
July 22, 2021         Patient: French Hurd   YOB: 1965   Date of Visit: 7/22/2021           To Whom it May Concern:    French Hurd was seen in my clinic on 7/22/2021. He may return to work in 2-3 days.    If you have any questions or concerns, please don't hesitate to call.        Sincerely,           Manoj Bower M.D.  Electronically Signed

## 2021-07-22 NOTE — LETTER
July 22, 2021         Patient: French Hurd   YOB: 1965   Date of Visit: 7/22/2021           To Whom it May Concern:    French Hurd was seen in my clinic on 7/22/2021. He may return to work in 5 days without restrictions.    If you have any questions or concerns, please don't hesitate to call.        Sincerely,           Manoj Bower M.D.  Electronically Signed

## 2021-07-22 NOTE — PROGRESS NOTES
Subjective:      French Hurd is a 55 y.o. male who presents with Wound Infection (poss cellulitis in (L) lower leg )      - This is a pleasant and nontoxic appearing 55 y.o. male with c/o redness swelling RLE x 2 days, feels like when he had cellulitis in past. No posterior calf pain/swelling, no NVFC, no trauma. No CP/SOB.       ALLERGIES:  Patient has no known allergies.     PMH:  Past Medical History:   Diagnosis Date   • Allergy    • ASTHMA    • Back pain    • BPH (benign prostatic hypertrophy)    • Breast mass     right side between 11 and 12 o'clock at nipple   • Cervical spine degeneration    • Chest pain    • Difficulty breathing    • Earache    • ED (erectile dysfunction)    • High cholesterol    • Hypercholesteremia    • Hypertension    • Obesity    • Osteoarthritis    • Prediabetes    • Right arm pain    • Ringing in ears    • Sleep apnea     on CPAP   • Sweat, sweating, excessive    • Wears glasses         PSH:  Past Surgical History:   Procedure Laterality Date   • COLONOSCOPY - ENDO  8/9/2019    Procedure: COLONOSCOPY - AVERAGE RISK SCREENING;  Surgeon: Gopal Dinh M.D.;  Location: SURGERY [facility];  Service: Gastroenterology   • HARDWARE REMOVAL ORTHO Right 2017    right hand   • CERVICAL DISK AND FUSION ANTERIOR  2017    C5,6,7   • APPENDECTOMY  2001   • OTHER ORTHOPEDIC SURGERY Left 1993    L wrist, R hand-4th and 5th metacarpal   • LIGAMENT RECONSTRUCTION Left 1991    ulna   • ANKLE ORIF Right 1987   • HARDWARE REMOVAL ORTHO Right 1987    right ankle   • TONSILLECTOMY  1983   • VASECTOMY      x2       MEDS:    Current Outpatient Medications:   •  cephALEXin (KEFLEX) 500 MG Cap, Take 1 capsule by mouth 4 times a day for 7 days., Disp: 28 capsule, Rfl: 0  •  losartan (COZAAR) 100 MG Tab, Take 100 mg by mouth every day., Disp: , Rfl:   •  Levocetirizine Dihydrochloride 5 MG Tab, Take  by mouth., Disp: , Rfl:   •  montelukast (SINGULAIR) 10 MG Tab, Take 1 tablet by mouth every  "day., Disp: 90 tablet, Rfl: 3  •  hydrOXYzine HCl (ATARAX) 25 MG Tab, Take 1 Tab by mouth 3 times a day as needed for Itching., Disp: 20 Tab, Rfl: 0  •  acetaminophen (TYLENOL) 500 MG Tab, Take 1-2 Tabs by mouth every 6 hours as needed (Mild Pain; (Pain scale 1-3); Temp greater than 100.5 F)., Disp: , Rfl:   •  fluticasone-salmeterol (ADVAIR) 100-50 MCG/DOSE AEROSOL POWDER, BREATH ACTIVATED, Inhale 1 Puff by mouth every 12 hours., Disp: 1 Each, Rfl: 0  •  esomeprazole (NEXIUM) 20 MG capsule, Take 40 mg by mouth every day., Disp: , Rfl:   •  albuterol 108 (90 Base) MCG/ACT Aero Soln inhalation aerosol, Inhale 2 Puffs by mouth 1 time daily as needed., Disp: , Rfl:   •  amLODIPine (NORVASC) 5 MG Tab, Take 5 mg by mouth every day., Disp: , Rfl:   •  tizanidine (ZANAFLEX) 4 MG Tab, Take 4 mg by mouth at bedtime as needed., Disp: , Rfl:   •  fexofenadine (ALLEGRA) 180 MG tablet, Take 180 mg by mouth 1 time daily as needed., Disp: , Rfl:   •  fenofibrate (TRICOR) 145 MG Tab, Take 145 mg by mouth every evening., Disp: , Rfl:   •  ibuprofen (MOTRIN) 200 MG Tab, Take 800 mg by mouth 1 time daily as needed for Mild Pain., Disp: , Rfl:   •  zolpidem (AMBIEN) 10 MG TABS, Take 1 Tab by mouth at bedtime as needed for Sleep., Disp: 90 Each, Rfl: 3    ** I have documented what I find to be significant in regards to past medical, social, family and surgical history  in my HPI or under PMH/PSH/FH review section, otherwise it is noncontributory **           HPI    Review of Systems   All other systems reviewed and are negative.         Objective:     /86   Pulse 77   Temp 36.6 °C (97.9 °F)   Resp 18   Ht 1.727 m (5' 8\")   Wt (!) 143 kg (315 lb)   SpO2 97%   BMI 47.90 kg/m²      Physical Exam  Vitals and nursing note reviewed.   Constitutional:       General: He is not in acute distress.     Appearance: Normal appearance. He is well-developed.   HENT:      Head: Normocephalic and atraumatic.   Eyes:      General: No scleral " icterus.  Cardiovascular:      Heart sounds: Normal heart sounds. No murmur heard.     Pulmonary:      Effort: Pulmonary effort is normal. No respiratory distress.   Musculoskeletal:        Legs:       Comments: + erythema warmth and TTP lateral aspect calf. NVI.   Skin:     Coloration: Skin is not jaundiced or pale.   Neurological:      Mental Status: He is alert.      Motor: No abnormal muscle tone.   Psychiatric:         Mood and Affect: Mood normal.         Behavior: Behavior normal.       Assessment/Plan:          1. Leg swelling  US-EXTREMITY VENOUS LOWER UNILAT RIGHT   2. Skin infection  cephALEXin (KEFLEX) 500 MG Cap       - Dx, plan & d/c instructions discussed w/ patient and/or family members  - Rest, stay hydrated OTC Motrin and/or Tylenol as needed  - E.R. precautions discussed     Asked to kindly follow up with their PCP's office in 2-3 days for a recheck, ER if not improving or feeling/getting worse.    Any realistic side effects of medications that may have been given today reviewed.     Patient left in stable condition

## 2021-07-25 ENCOUNTER — OFFICE VISIT (OUTPATIENT)
Dept: URGENT CARE | Facility: PHYSICIAN GROUP | Age: 56
End: 2021-07-25
Payer: COMMERCIAL

## 2021-07-25 VITALS
HEIGHT: 68 IN | TEMPERATURE: 97.4 F | WEIGHT: 312 LBS | HEART RATE: 66 BPM | DIASTOLIC BLOOD PRESSURE: 82 MMHG | OXYGEN SATURATION: 94 % | RESPIRATION RATE: 16 BRPM | BODY MASS INDEX: 47.29 KG/M2 | SYSTOLIC BLOOD PRESSURE: 146 MMHG

## 2021-07-25 DIAGNOSIS — L03.115 CELLULITIS OF RIGHT LEG: ICD-10-CM

## 2021-07-25 DIAGNOSIS — L30.9 DERMATITIS: ICD-10-CM

## 2021-07-25 PROCEDURE — 99213 OFFICE O/P EST LOW 20 MIN: CPT | Performed by: PHYSICIAN ASSISTANT

## 2021-07-25 RX ORDER — CEPHALEXIN 500 MG/1
500 CAPSULE ORAL 4 TIMES DAILY
Qty: 28 CAPSULE | Refills: 2 | Status: SHIPPED | OUTPATIENT
Start: 2021-07-25 | End: 2021-08-01

## 2021-07-25 RX ORDER — PREDNISONE 10 MG/1
TABLET ORAL
Qty: 15 TABLET | Refills: 2 | Status: SHIPPED | OUTPATIENT
Start: 2021-07-25 | End: 2022-05-27

## 2021-07-25 ASSESSMENT — FIBROSIS 4 INDEX: FIB4 SCORE: 1.48

## 2021-07-25 NOTE — PROGRESS NOTES
Chief Complaint   Patient presents with   • Orbital Cellulitis     R leg        HISTORY OF PRESENT ILLNESS: Patient is a 55 y.o. male who presents today for the following:    Patient comes in for evaluation of cellulitis on his right lower leg.  Has significantly improved but does continue to have some soreness.  He has had frequent issues with this leg.  He has been hospitalized for the same.  He also has frequent issues with dermatitis, often what is starting the cellulitis.  He denies fever, chills, body aches.  He states the swelling has improved significantly.    Patient Active Problem List    Diagnosis Date Noted   • Prediabetes 10/24/2020   • Cellulitis of left lower extremity 10/23/2020   • Psoriasis 10/23/2020   • Mild persistent asthma with acute exacerbation 05/21/2017   • BMI 45.0-49.9, adult ([facility]) 09/22/2015   • Bronchitis with bronchospasm 08/25/2015   • Knee pain 12/23/2014   • Asthma in adult 04/24/2014   • Elevated blood sugar 01/23/2014   • Leg pain, left 10/10/2013   • Vitamin d deficiency 01/10/2012   • GERD (gastroesophageal reflux disease) 06/15/2011   • Tinea pedis of both feet 02/03/2011   • ED (erectile dysfunction) 02/03/2011   • Environmental allergies 02/03/2011   • Insomnia 03/16/2010   • Hypertension    • Obesity    • Osteoarthritis    • Obstructive sleep apnea    • Hypercholesteremia    • Cervical spine degeneration        Allergies:Patient has no known allergies.    Current Outpatient Medications Ordered in Epic   Medication Sig Dispense Refill   • cephALEXin (KEFLEX) 500 MG Cap Take 1 capsule by mouth 4 times a day for 7 days. 28 capsule 2   • predniSONE (DELTASONE) 10 MG Tab 50 mg x 1 day; 40 mg x 1 day; 30 mg x 1 day; 20 mg x 1 day; 10 mg x 1 day 15 tablet 2   • cephALEXin (KEFLEX) 500 MG Cap Take 1 capsule by mouth 4 times a day for 7 days. 28 capsule 0   • losartan (COZAAR) 100 MG Tab Take 100 mg by mouth every day.     • Levocetirizine Dihydrochloride 5 MG Tab Take  by mouth.      • montelukast (SINGULAIR) 10 MG Tab Take 1 tablet by mouth every day. 90 tablet 3   • hydrOXYzine HCl (ATARAX) 25 MG Tab Take 1 Tab by mouth 3 times a day as needed for Itching. 20 Tab 0   • acetaminophen (TYLENOL) 500 MG Tab Take 1-2 Tabs by mouth every 6 hours as needed (Mild Pain; (Pain scale 1-3); Temp greater than 100.5 F).     • fluticasone-salmeterol (ADVAIR) 100-50 MCG/DOSE AEROSOL POWDER, BREATH ACTIVATED Inhale 1 Puff by mouth every 12 hours. 1 Each 0   • esomeprazole (NEXIUM) 20 MG capsule Take 40 mg by mouth every day.     • albuterol 108 (90 Base) MCG/ACT Aero Soln inhalation aerosol Inhale 2 Puffs by mouth 1 time daily as needed.     • amLODIPine (NORVASC) 5 MG Tab Take 5 mg by mouth every day.     • tizanidine (ZANAFLEX) 4 MG Tab Take 4 mg by mouth at bedtime as needed.     • fexofenadine (ALLEGRA) 180 MG tablet Take 180 mg by mouth 1 time daily as needed.     • fenofibrate (TRICOR) 145 MG Tab Take 145 mg by mouth every evening.     • ibuprofen (MOTRIN) 200 MG Tab Take 800 mg by mouth 1 time daily as needed for Mild Pain.     • zolpidem (AMBIEN) 10 MG TABS Take 1 Tab by mouth at bedtime as needed for Sleep. 90 Each 3     No current Epic-ordered facility-administered medications on file.       Past Medical History:   Diagnosis Date   • Allergy    • ASTHMA    • Back pain    • BPH (benign prostatic hypertrophy)    • Breast mass     right side between 11 and 12 o'clock at nipple   • Cervical spine degeneration    • Chest pain    • Difficulty breathing    • Earache    • ED (erectile dysfunction)    • High cholesterol    • Hypercholesteremia    • Hypertension    • Obesity    • Osteoarthritis    • Prediabetes    • Right arm pain    • Ringing in ears    • Sleep apnea     on CPAP   • Sweat, sweating, excessive    • Wears glasses        Social History     Tobacco Use   • Smoking status: Former Smoker     Packs/day: 0.50     Years: 0.50     Pack years: 0.25     Types: Cigarettes     Quit date: 2/11/1982      "Years since quittin.4   • Smokeless tobacco: Never Used   Vaping Use   • Vaping Use: Never used   Substance Use Topics   • Alcohol use: Yes     Alcohol/week: 1.8 oz     Types: 3 Standard drinks or equivalent per week   • Drug use: No     Comment: hx of ephedirn for weight loss over 2 years ago       Family Status   Relation Name Status   • Mo  Other        pt is adopted   • Fa  Other        pt is adopted   • Jan  Alive   • Jan  Alive   History reviewed. No pertinent family history.    Review of Systems:    Constitutional ROS: No unexpected change in weight, No weakness, No fatigue  Pulmonary ROS: No chronic cough, sputum, or hemoptysis, No dyspnea on exertion, No wheezing  Cardiovascular ROS: No diaphoresis, No edema, No palpitations  Musculoskeletal/Extremities ROS: Right lower leg cellulitis, improving  Hematologic/Lymphatic ROS: No chills, No night sweats, No weight loss  Skin/Integumentary ROS: No edema, No evidence of rash, No itching      Exam:  /82   Pulse 66   Temp 36.3 °C (97.4 °F) (Temporal)   Resp 16   Ht 1.727 m (5' 8\")   Wt (!) 142 kg (312 lb)   SpO2 94%   General: Well developed, well nourished. No distress.    HENT: Head is grossly normal.  Pulmonary: Unlabored respiratory effort.   Neurologic: Grossly nonfocal. No facial asymmetry noted.  Musculoskeletal: Mild tenderness remains on the lateral aspect of the right lower leg.  Swelling seems to have significantly improved.  No evidence of dermatitis flare at this time.  Skin: Warm, dry, good turgor. No rashes in visible areas.   Psych: Normal mood. Alert and oriented to person, place and time.    Assessment/Plan:  Patient is familiar to me as I have seen her multiple times for the cellulitis and dermatitis.  Patient is frequently in overall location for work and is unable to get to a medical facility in a timely fashion.  Provided patient with antibiotics and steroids to be used appropriately.  Patient has had this enough in the past " understand when it is appropriate to use it.  Follow up for worsening or persistent symptoms.  1. Cellulitis of right leg  cephALEXin (KEFLEX) 500 MG Cap   2. Dermatitis  predniSONE (DELTASONE) 10 MG Tab

## 2021-08-02 ENCOUNTER — APPOINTMENT (OUTPATIENT)
Dept: SLEEP MEDICINE | Facility: MEDICAL CENTER | Age: 56
End: 2021-08-02
Payer: COMMERCIAL

## 2021-09-22 ENCOUNTER — SLEEP CENTER VISIT (OUTPATIENT)
Dept: SLEEP MEDICINE | Facility: MEDICAL CENTER | Age: 56
End: 2021-09-22
Payer: COMMERCIAL

## 2021-09-22 VITALS
SYSTOLIC BLOOD PRESSURE: 132 MMHG | HEIGHT: 68 IN | WEIGHT: 309 LBS | HEART RATE: 91 BPM | DIASTOLIC BLOOD PRESSURE: 74 MMHG | OXYGEN SATURATION: 92 % | BODY MASS INDEX: 46.83 KG/M2 | RESPIRATION RATE: 16 BRPM

## 2021-09-22 DIAGNOSIS — K21.9 GASTROESOPHAGEAL REFLUX DISEASE, UNSPECIFIED WHETHER ESOPHAGITIS PRESENT: ICD-10-CM

## 2021-09-22 DIAGNOSIS — I10 ESSENTIAL HYPERTENSION: ICD-10-CM

## 2021-09-22 DIAGNOSIS — G47.33 OSA (OBSTRUCTIVE SLEEP APNEA): ICD-10-CM

## 2021-09-22 DIAGNOSIS — J45.909 PERSISTENT ASTHMA WITHOUT COMPLICATION, UNSPECIFIED ASTHMA SEVERITY: ICD-10-CM

## 2021-09-22 PROCEDURE — 99204 OFFICE O/P NEW MOD 45 MIN: CPT | Performed by: PREVENTIVE MEDICINE

## 2021-09-22 ASSESSMENT — FIBROSIS 4 INDEX: FIB4 SCORE: 1.5

## 2021-09-22 NOTE — PROGRESS NOTES
"CC: \"I think it is time for a new machine\".        HPI:  French Hurd is a 56 y.o.male  kindly referred by Jessi Salvador D.O. this patient has a past medical history that includes asthma that is generally triggered by exercise and environmental allergies.  Currently he uses albuterol approximately once per week.  He has also been diagnosed with hypertension, dyslipidemia, obesity and back pain.  Surgical history includes C-spine fusion and multiple orthopedic surgeries.  He has less than a 5 pack year of cigarette smoking and quit in the 1980s.  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  He no longer smokes.  He does drink at least 1 alcoholic beverage per week and he will have 2-3 caffeinated beverages daily.  [place] score is 5 out of 24.  This is not consistent with excessive daytime sleepiness    First sleep study results:  TYPE: Split-night in lab  DATE: May 30, 2016  Diagnostic:AHI: 102.3   diagnostic  Oxygen Juan: 50.0%  TREATMENT AHI: 7.8  TREATMENT Oxygen Juan: 83.0% and 9.4 minutes under 88% in the total sleep time  TITRATION: CPAP from 9-18 cmH2O.    Based on that study the patient was started on CPAP of 18 cm water pressure.  Patient started Pap therapy in 2016.      Patient Active Problem List    Diagnosis Date Noted   • Prediabetes 10/24/2020   • Cellulitis of left lower extremity 10/23/2020   • Psoriasis 10/23/2020   • Mild persistent asthma with acute exacerbation 05/21/2017   • BMI 45.0-49.9, adult ([facility]) 09/22/2015   • Bronchitis with bronchospasm 08/25/2015   • Knee pain 12/23/2014   • Asthma in adult 04/24/2014   • Elevated blood sugar 01/23/2014   • Leg pain, left 10/10/2013   • Vitamin d deficiency 01/10/2012   • GERD (gastroesophageal reflux disease) 06/15/2011   • Tinea pedis of both feet 02/03/2011   • ED (erectile dysfunction) 02/03/2011   • Environmental allergies 02/03/2011   • Insomnia 03/16/2010   • Hypertension    • Obesity    • Osteoarthritis    • " Obstructive sleep apnea    • Hypercholesteremia    • Cervical spine degeneration        Past Medical History:   Diagnosis Date   • Allergy    • ASTHMA    • Back pain    • BPH (benign prostatic hypertrophy)    • Breast mass     right side between 11 and 12 o'clock at nipple   • Cervical spine degeneration    • Chest pain    • Difficulty breathing    • Earache    • ED (erectile dysfunction)    • High cholesterol    • Hypercholesteremia    • Hypertension    • Obesity    • Osteoarthritis    • Prediabetes    • Right arm pain    • Ringing in ears    • Sleep apnea     on CPAP   • Sweat, sweating, excessive    • Wears glasses         Past Surgical History:   Procedure Laterality Date   • COLONOSCOPY - ENDO  2019    Procedure: COLONOSCOPY - AVERAGE RISK SCREENING;  Surgeon: Gopal Dinh M.D.;  Location: SURGERY [facility];  Service: Gastroenterology   • HARDWARE REMOVAL ORTHO Right     right hand   • CERVICAL DISK AND FUSION ANTERIOR      C5,6,7   • APPENDECTOMY     • OTHER ORTHOPEDIC SURGERY Left     L wrist, R hand-4th and 5th metacarpal   • LIGAMENT RECONSTRUCTION Left     ulna   • ANKLE ORIF Right    • HARDWARE REMOVAL ORTHO Right     right ankle   • TONSILLECTOMY     • TONSILLECTOMY     • VASECTOMY      x2       Family History   Problem Relation Age of Onset   • Sleep Apnea Father    • Sleep Apnea Sister        Social History     Socioeconomic History   • Marital status:      Spouse name: Not on file   • Number of children: Not on file   • Years of education: Not on file   • Highest education level: Not on file   Occupational History     Employer: OTHER     Comment: pt is adopted and does not know family history   Tobacco Use   • Smoking status: Former Smoker     Packs/day: 0.50     Years: 0.50     Pack years: 0.25     Types: Cigarettes     Quit date: 1982     Years since quittin.6   • Smokeless tobacco: Never Used   Vaping Use   • Vaping Use: Never used    Substance and Sexual Activity   • Alcohol use: Yes     Alcohol/week: 1.8 oz     Types: 3 Standard drinks or equivalent per week   • Drug use: No     Comment: hx of ephedirn for weight loss over 2 years ago   • Sexual activity: Yes     Partners: Female   Other Topics Concern   • Not on file   Social History Narrative   • Not on file     Social Determinants of Health     Financial Resource Strain:    • Difficulty of Paying Living Expenses:    Food Insecurity:    • Worried About Running Out of Food in the Last Year:    • Ran Out of Food in the Last Year:    Transportation Needs:    • Lack of Transportation (Medical):    • Lack of Transportation (Non-Medical):    Physical Activity:    • Days of Exercise per Week:    • Minutes of Exercise per Session:    Stress:    • Feeling of Stress :    Social Connections:    • Frequency of Communication with Friends and Family:    • Frequency of Social Gatherings with Friends and Family:    • Attends Temple Services:    • Active Member of Clubs or Organizations:    • Attends Club or Organization Meetings:    • Marital Status:    Intimate Partner Violence:    • Fear of Current or Ex-Partner:    • Emotionally Abused:    • Physically Abused:    • Sexually Abused:        Current Outpatient Medications   Medication Sig Dispense Refill   • predniSONE (DELTASONE) 10 MG Tab 50 mg x 1 day; 40 mg x 1 day; 30 mg x 1 day; 20 mg x 1 day; 10 mg x 1 day 15 tablet 2   • losartan (COZAAR) 100 MG Tab Take 100 mg by mouth every day.     • Levocetirizine Dihydrochloride 5 MG Tab Take  by mouth.     • montelukast (SINGULAIR) 10 MG Tab Take 1 tablet by mouth every day. 90 tablet 3   • hydrOXYzine HCl (ATARAX) 25 MG Tab Take 1 Tab by mouth 3 times a day as needed for Itching. 20 Tab 0   • acetaminophen (TYLENOL) 500 MG Tab Take 1-2 Tabs by mouth every 6 hours as needed (Mild Pain; (Pain scale 1-3); Temp greater than 100.5 F).     • fluticasone-salmeterol (ADVAIR) 100-50 MCG/DOSE AEROSOL POWDER, BREATH  "ACTIVATED Inhale 1 Puff by mouth every 12 hours. 1 Each 0   • esomeprazole (NEXIUM) 20 MG capsule Take 40 mg by mouth every day.     • albuterol 108 (90 Base) MCG/ACT Aero Soln inhalation aerosol Inhale 2 Puffs by mouth 1 time daily as needed.     • amLODIPine (NORVASC) 5 MG Tab Take 5 mg by mouth every day.     • tizanidine (ZANAFLEX) 4 MG Tab Take 4 mg by mouth at bedtime as needed.     • fexofenadine (ALLEGRA) 180 MG tablet Take 180 mg by mouth 1 time daily as needed.     • fenofibrate (TRICOR) 145 MG Tab Take 145 mg by mouth every evening.     • ibuprofen (MOTRIN) 200 MG Tab Take 800 mg by mouth 1 time daily as needed for Mild Pain.     • zolpidem (AMBIEN) 10 MG TABS Take 1 Tab by mouth at bedtime as needed for Sleep. 90 Each 3     No current facility-administered medications for this visit.    \"CURRENT RX\"    ALLERGIES: Patient has no known allergies.    ROS    Constitutional: Denies fever, chills, sweats,  weight loss, fatigue  Cardiovascular: Denies chest pain, tightness, palpitations, swelling in legs/feet, fainting, difficulty breathing when lying down but gets better when sitting up.   Respiratory: Denies shortness of breath, cough, sputum, wheezing, painful breathing, coughing up blood.   Sleep: per HPI  Gastrointestinal: Denies  difficulty swallowing, nausea, abdominal pain, diarrhea, constipation, heartburn.  Musculoskeletal: Denies painful joints, sore muscles, back pain.        PHYSICAL EXAM      /74 (BP Location: Left arm, Patient Position: Sitting, BP Cuff Size: Large adult)   Pulse 91   Resp 16   Ht 1.727 m (5' 8\")   Wt (!) 140 kg (309 lb)   SpO2 92%   BMI 46.98 kg/m²   Appearance: Well-nourished, looks stated age no acute distress  Eyes:  PERRLA, EOMI  ENMT: masked  Neck: Supple, trachea midline, no masses  Respiratory effort:  No intercostal retractions or use of accessory muscles  Musculoskeletal:  Grossly normal; gait and station normal; digits and nails normal  Skin:  No rashes, " petechiae, cyanosis  Neurologic: without focal signs; oriented to person, time, place, and purpose; judgement intact  Psychiatric:  No depression, anxiety, agitation      Medical Decision Making       The medical record was reviewed in its entirety including the referral notes, records from primary care, consultants notes, hospital records, lab, imaging, microbiology, immunology, and immunizations.  Care gaps identified and reviewed with the patient.    Diagnostic and titration nocturnal polysomnogram's, home sleep apnea tests, continuous nocturnal oximetry results, multiple sleep latency tests, and compliance reports reviewed.    ASSESSMENT:  1. JOSSY (obstructive sleep apnea)    - DME CPAP new APAP machine ordered.  At next visit we will discuss the need for an overnight oximetry study to determine his oxygen saturation on Pap therapy.  This assessment will be offered due to the results of his split-night study in May 2016.    2. Essential hypertension    - DME CPAP    3. Persistent asthma without complication, unspecified asthma severity    - DME CPAP    4. Gastroesophageal reflux disease, unspecified whether esophagitis present    - DME CPAP    PLAN:     A [employer] APAP machine will be ordered    Minimum pressure of16 with a maximum pressure of 20 cm of water and heated humidification with data available on cloud.. Access to this patient's compliance data and usage data will allow for a further empiric titration. A careful mask fit is required.  Mask per patient's choice.      The risks of untreated sleep apnea were discussed with the patient at length. Patients with JOSSY are at increased risk of cardiovascular disease including coronary artery disease, systemic arterial hypertension, pulmonary arterial hypertension, cardiac arrythmias, and stroke. JOSSY patients have an increased risk of motor vehicle accidents, type 2 diabetes, chronic kidney disease, and non-alcoholic liver disease. The patient was advised to avoid  driving a motor vehicle when drowsy.    RTC: 9 weeks for first compliance check

## 2021-09-26 ENCOUNTER — OFFICE VISIT (OUTPATIENT)
Dept: URGENT CARE | Facility: PHYSICIAN GROUP | Age: 56
End: 2021-09-26
Payer: COMMERCIAL

## 2021-09-26 VITALS
BODY MASS INDEX: 46.23 KG/M2 | HEART RATE: 94 BPM | WEIGHT: 305 LBS | OXYGEN SATURATION: 94 % | DIASTOLIC BLOOD PRESSURE: 86 MMHG | RESPIRATION RATE: 18 BRPM | TEMPERATURE: 97.6 F | SYSTOLIC BLOOD PRESSURE: 150 MMHG | HEIGHT: 68 IN

## 2021-09-26 DIAGNOSIS — L03.116 CELLULITIS OF LEFT LOWER EXTREMITY: ICD-10-CM

## 2021-09-26 PROCEDURE — 99213 OFFICE O/P EST LOW 20 MIN: CPT | Performed by: PHYSICIAN ASSISTANT

## 2021-09-26 RX ORDER — DOXYCYCLINE HYCLATE 100 MG
100 TABLET ORAL 2 TIMES DAILY
Qty: 20 TABLET | Refills: 0 | Status: SHIPPED | OUTPATIENT
Start: 2021-09-26 | End: 2022-10-19

## 2021-09-26 ASSESSMENT — FIBROSIS 4 INDEX: FIB4 SCORE: 1.5

## 2021-09-26 NOTE — PROGRESS NOTES
Chief Complaint   Patient presents with   • Preseptal Cellulitis       HISTORY OF PRESENT ILLNESS: Patient is a 56 y.o. male who presents today for the following:    Patient is here concerning possible cellulitis.  Patient has a long history of intermittent cellulitis on the lower extremities.  He is about to go out for work and is concerned he may need an antibiotic while he is gone.  He received his first of 2 Covid vaccines about 10 days ago.  He had body aches and chills for the first 3 days after the vaccine and has slowly been feeling better.  He states he just feels off, similar to what he feels like when his cellulitis flares up.    Patient Active Problem List    Diagnosis Date Noted   • Prediabetes 10/24/2020   • Cellulitis of left lower extremity 10/23/2020   • Psoriasis 10/23/2020   • Mild persistent asthma with acute exacerbation 05/21/2017   • BMI 45.0-49.9, adult ([facility]) 09/22/2015   • Bronchitis with bronchospasm 08/25/2015   • Knee pain 12/23/2014   • Asthma in adult 04/24/2014   • Elevated blood sugar 01/23/2014   • Leg pain, left 10/10/2013   • Vitamin d deficiency 01/10/2012   • GERD (gastroesophageal reflux disease) 06/15/2011   • Tinea pedis of both feet 02/03/2011   • ED (erectile dysfunction) 02/03/2011   • Environmental allergies 02/03/2011   • Insomnia 03/16/2010   • Hypertension    • Obesity    • Osteoarthritis    • Obstructive sleep apnea    • Hypercholesteremia    • Cervical spine degeneration        Allergies:Patient has no known allergies.    Current Outpatient Medications Ordered in Epic   Medication Sig Dispense Refill   • doxycycline (VIBRAMYCIN) 100 MG Tab Take 1 Tablet by mouth 2 times a day. 20 Tablet 0   • predniSONE (DELTASONE) 10 MG Tab 50 mg x 1 day; 40 mg x 1 day; 30 mg x 1 day; 20 mg x 1 day; 10 mg x 1 day 15 tablet 2   • losartan (COZAAR) 100 MG Tab Take 100 mg by mouth every day.     • Levocetirizine Dihydrochloride 5 MG Tab Take  by mouth.     • montelukast (SINGULAIR) 10  MG Tab Take 1 tablet by mouth every day. 90 tablet 3   • hydrOXYzine HCl (ATARAX) 25 MG Tab Take 1 Tab by mouth 3 times a day as needed for Itching. 20 Tab 0   • acetaminophen (TYLENOL) 500 MG Tab Take 1-2 Tabs by mouth every 6 hours as needed (Mild Pain; (Pain scale 1-3); Temp greater than 100.5 F).     • fluticasone-salmeterol (ADVAIR) 100-50 MCG/DOSE AEROSOL POWDER, BREATH ACTIVATED Inhale 1 Puff by mouth every 12 hours. 1 Each 0   • esomeprazole (NEXIUM) 20 MG capsule Take 40 mg by mouth every day.     • albuterol 108 (90 Base) MCG/ACT Aero Soln inhalation aerosol Inhale 2 Puffs by mouth 1 time daily as needed.     • amLODIPine (NORVASC) 5 MG Tab Take 5 mg by mouth every day.     • tizanidine (ZANAFLEX) 4 MG Tab Take 4 mg by mouth at bedtime as needed.     • fexofenadine (ALLEGRA) 180 MG tablet Take 180 mg by mouth 1 time daily as needed.     • fenofibrate (TRICOR) 145 MG Tab Take 145 mg by mouth every evening.     • ibuprofen (MOTRIN) 200 MG Tab Take 800 mg by mouth 1 time daily as needed for Mild Pain.     • zolpidem (AMBIEN) 10 MG TABS Take 1 Tab by mouth at bedtime as needed for Sleep. 90 Each 3     No current Epic-ordered facility-administered medications on file.       Past Medical History:   Diagnosis Date   • Allergy    • ASTHMA    • Back pain    • BPH (benign prostatic hypertrophy)    • Breast mass     right side between 11 and 12 o'clock at nipple   • Cervical spine degeneration    • Chest pain    • Difficulty breathing    • Earache    • ED (erectile dysfunction)    • High cholesterol    • Hypercholesteremia    • Hypertension    • Obesity    • Osteoarthritis    • Prediabetes    • Right arm pain    • Ringing in ears    • Sleep apnea     on CPAP   • Sweat, sweating, excessive    • Wears glasses        Social History     Tobacco Use   • Smoking status: Former Smoker     Packs/day: 0.50     Years: 0.50     Pack years: 0.25     Types: Cigarettes     Quit date: 1982     Years since quittin.6   •  "Smokeless tobacco: Never Used   Vaping Use   • Vaping Use: Never used   Substance Use Topics   • Alcohol use: Yes     Alcohol/week: 1.8 oz     Types: 3 Standard drinks or equivalent per week   • Drug use: No     Comment: hx of ephedirn for weight loss over 2 years ago       Family Status   Relation Name Status   • Mo  Other        pt is adopted   • Fa  Other        pt is adopted   • Jan  Alive   • Jan  Alive   • Sis  (Not Specified)     Family History   Problem Relation Age of Onset   • Sleep Apnea Father    • Sleep Apnea Sister        Review of Systems:    Constitutional ROS: No unexpected change in weight, No weakness, No fatigue  Pulmonary ROS: No chronic cough, sputum, or hemoptysis, No dyspnea on exertion, No wheezing  Cardiovascular ROS: No diaphoresis, No edema, No palpitations  Hematologic/Lymphatic ROS: No chills, No night sweats, No weight loss  Skin/Integumentary ROS: No edema, No evidence of rash, No itching      Exam:  /86   Pulse 94   Temp 36.4 °C (97.6 °F) (Temporal)   Resp 18   Ht 1.727 m (5' 8\")   Wt (!) 138 kg (305 lb)   SpO2 94%   General: Well developed, well nourished. No distress.    HENT: Head is grossly normal.  Pulmonary: Unlabored respiratory effort.   Neurologic: Grossly nonfocal. No facial asymmetry noted.  Musculoskeletal: No signs of cellulitis on the extremities.  Skin: Warm, dry, good turgor. No rashes in visible areas.   Psych: Normal mood. Alert and oriented to person, place and time.    Assessment/Plan:  Patient is very familiar with cellulitis signs and symptoms.  Patient understands not to take the antibiotics unless these develop.  Follow up for worsening or persistent symptoms.  1. Cellulitis of left lower extremity  doxycycline (VIBRAMYCIN) 100 MG Tab       "

## 2021-11-15 ENCOUNTER — TELEPHONE (OUTPATIENT)
Dept: SCHEDULING | Facility: IMAGING CENTER | Age: 56
End: 2021-11-15

## 2021-11-16 NOTE — TELEPHONE ENCOUNTER
Bruce,      Pt's wife Kalie called and stated that pt has not heard from [facility] about the CPAP machine yet and is concerned. Pt is wanting to see is someone can check on this for him.    Pt is also needing a refill of his inhaled medication Advair.  Pt is completely out.       Thank you,  Allison TO

## 2021-11-17 DIAGNOSIS — J45.20 INTERMITTENT ASTHMA, UNSPECIFIED ASTHMA SEVERITY, UNSPECIFIED WHETHER COMPLICATED: ICD-10-CM

## 2021-11-17 DIAGNOSIS — R06.02 SHORTNESS OF BREATH: ICD-10-CM

## 2021-11-21 DIAGNOSIS — L03.115 CELLULITIS OF RIGHT LOWER EXTREMITY: ICD-10-CM

## 2021-11-21 RX ORDER — DOXYCYCLINE HYCLATE 100 MG
100 TABLET ORAL 2 TIMES DAILY
Qty: 20 TABLET | Refills: 1 | Status: SHIPPED | OUTPATIENT
Start: 2021-11-21 | End: 2021-12-01

## 2021-11-30 ENCOUNTER — APPOINTMENT (OUTPATIENT)
Dept: SLEEP MEDICINE | Facility: MEDICAL CENTER | Age: 56
End: 2021-11-30
Payer: COMMERCIAL

## 2021-11-30 ENCOUNTER — OFFICE VISIT (OUTPATIENT)
Dept: SLEEP MEDICINE | Facility: MEDICAL CENTER | Age: 56
End: 2021-11-30
Payer: COMMERCIAL

## 2021-11-30 ENCOUNTER — APPOINTMENT (OUTPATIENT)
Dept: SLEEP MEDICINE | Facility: MEDICAL CENTER | Age: 56
End: 2021-11-30
Attending: INTERNAL MEDICINE
Payer: COMMERCIAL

## 2021-11-30 VITALS
DIASTOLIC BLOOD PRESSURE: 76 MMHG | TEMPERATURE: 97.5 F | SYSTOLIC BLOOD PRESSURE: 142 MMHG | WEIGHT: 315 LBS | BODY MASS INDEX: 47.74 KG/M2 | OXYGEN SATURATION: 97 % | HEART RATE: 85 BPM | RESPIRATION RATE: 18 BRPM | HEIGHT: 68 IN

## 2021-11-30 DIAGNOSIS — G47.33 OSA (OBSTRUCTIVE SLEEP APNEA): ICD-10-CM

## 2021-11-30 DIAGNOSIS — E66.01 CLASS 3 SEVERE OBESITY WITH BODY MASS INDEX (BMI) OF 45.0 TO 49.9 IN ADULT, UNSPECIFIED OBESITY TYPE, UNSPECIFIED WHETHER SERIOUS COMORBIDITY PRESENT (HCC): ICD-10-CM

## 2021-11-30 DIAGNOSIS — J45.20 INTERMITTENT ASTHMA, UNSPECIFIED ASTHMA SEVERITY, UNSPECIFIED WHETHER COMPLICATED: ICD-10-CM

## 2021-11-30 PROCEDURE — 99214 OFFICE O/P EST MOD 30 MIN: CPT | Performed by: INTERNAL MEDICINE

## 2021-11-30 RX ORDER — ALBUTEROL SULFATE 90 UG/1
2 AEROSOL, METERED RESPIRATORY (INHALATION) EVERY 4 HOURS PRN
Qty: 3 EACH | Refills: 3 | Status: SHIPPED | OUTPATIENT
Start: 2021-11-30 | End: 2022-11-23 | Stop reason: SDUPTHER

## 2021-11-30 ASSESSMENT — ENCOUNTER SYMPTOMS
COUGH: 0
SPUTUM PRODUCTION: 0
CHILLS: 0
SINUS PAIN: 0
EYE PAIN: 0
EYE REDNESS: 0
DEPRESSION: 0
WEIGHT LOSS: 0
PALPITATIONS: 0
HEMOPTYSIS: 0
HEARTBURN: 0
NAUSEA: 0
NECK PAIN: 0
ABDOMINAL PAIN: 0
DIZZINESS: 0
PHOTOPHOBIA: 0
TREMORS: 0
WEAKNESS: 0
DOUBLE VISION: 0
FEVER: 0
FOCAL WEAKNESS: 0
BACK PAIN: 0
MYALGIAS: 0
STRIDOR: 0
VOMITING: 0
DIAPHORESIS: 0
BLURRED VISION: 0
HEADACHES: 0
PND: 0
WHEEZING: 0
CLAUDICATION: 0
CONSTIPATION: 0
DIARRHEA: 0
ORTHOPNEA: 0
SHORTNESS OF BREATH: 0
SORE THROAT: 0
FALLS: 0
SPEECH CHANGE: 0
EYE DISCHARGE: 0

## 2021-11-30 ASSESSMENT — FIBROSIS 4 INDEX: FIB4 SCORE: 1.5

## 2021-11-30 NOTE — PATIENT INSTRUCTIONS
Dr. Amarjit Benson (primary MD, typically has wait list)  Dr. Alison Frederick (primary MD, typically has wait list)  Dr. Jordi Mathew (primary MD, [facility])

## 2021-11-30 NOTE — PROGRESS NOTES
Chief Complaint   Patient presents with   • Asthma     last seen 7/7/21    • Apnea     last seen 9/22/21 Dr. Mobley    • Results     [facility] [employer] 8/2/21 NO PFT          HPI: This patient is a 56 y.o. male whom is followed in our clinic for asthma last seen by me on 7/7/21. The patient's past medical history is significant for asthma since childhood which was typically triggered by exercise and environmental allergies.  He reports spring and fall being typical times when symptoms would flare and he would often require treatment with antibiotics and steroids but typically only once per year.  Prior to our last clinic visit the patient has needed 2 tx with prednisone in 2 mos but atypical. He has not required steroids since spring. He remains on advair 100 with good control of sxs, uses TRACEY <2x/week. He prefers ventolin to proair. He has seen sleep and is waiting on new CPAP machine. He is up to date on vaccines. He has suffered from recurrent cellulitis during which he notes respiratory sxs worsen. No PFTs.    Past Medical History:   Diagnosis Date   • Allergy    • ASTHMA    • Back pain    • BPH (benign prostatic hypertrophy)    • Breast mass     right side between 11 and 12 o'clock at nipple   • Cervical spine degeneration    • Chest pain    • Difficulty breathing    • Earache    • ED (erectile dysfunction)    • High cholesterol    • Hypercholesteremia    • Hypertension    • Obesity    • Osteoarthritis    • Prediabetes    • Right arm pain    • Ringing in ears    • Sleep apnea     on CPAP   • Sweat, sweating, excessive    • Wears glasses        Social History     Socioeconomic History   • Marital status:      Spouse name: Not on file   • Number of children: Not on file   • Years of education: Not on file   • Highest education level: Not on file   Occupational History     Employer: OTHER     Comment: pt is adopted and does not know family history   Tobacco Use   • Smoking status: Former Smoker     Packs/day: 0.50      Years: 0.50     Pack years: 0.25     Types: Cigarettes     Quit date: 1982     Years since quittin.8   • Smokeless tobacco: Never Used   Vaping Use   • Vaping Use: Never used   Substance and Sexual Activity   • Alcohol use: Yes     Alcohol/week: 1.8 oz     Types: 3 Standard drinks or equivalent per week   • Drug use: No     Comment: hx of ephedirn for weight loss over 2 years ago   • Sexual activity: Yes     Partners: Female   Other Topics Concern   • Not on file   Social History Narrative   • Not on file     Social Determinants of Health     Financial Resource Strain:    • Difficulty of Paying Living Expenses: Not on file   Food Insecurity:    • Worried About Running Out of Food in the Last Year: Not on file   • Ran Out of Food in the Last Year: Not on file   Transportation Needs:    • Lack of Transportation (Medical): Not on file   • Lack of Transportation (Non-Medical): Not on file   Physical Activity:    • Days of Exercise per Week: Not on file   • Minutes of Exercise per Session: Not on file   Stress:    • Feeling of Stress : Not on file   Social Connections:    • Frequency of Communication with Friends and Family: Not on file   • Frequency of Social Gatherings with Friends and Family: Not on file   • Attends Caoda[employer] Services: Not on file   • Active Member of Clubs or Organizations: Not on file   • Attends Club or Organization Meetings: Not on file   • Marital Status: Not on file   Intimate Partner Violence:    • Fear of Current or Ex-Partner: Not on file   • Emotionally Abused: Not on file   • Physically Abused: Not on file   • Sexually Abused: Not on file   Housing Stability:    • Unable to Pay for Housing in the Last Year: Not on file   • Number of Places Lived in the Last Year: Not on file   • Unstable Housing in the Last Year: Not on file       Family History   Problem Relation Age of Onset   • Sleep Apnea Father    • Sleep Apnea Sister        Current Outpatient Medications on File Prior to  Visit   Medication Sig Dispense Refill   • doxycycline (VIBRAMYCIN) 100 MG Tab Take 1 Tablet by mouth 2 times a day. 20 Tablet 0   • predniSONE (DELTASONE) 10 MG Tab 50 mg x 1 day; 40 mg x 1 day; 30 mg x 1 day; 20 mg x 1 day; 10 mg x 1 day 15 tablet 2   • losartan (COZAAR) 100 MG Tab Take 100 mg by mouth every day.     • Levocetirizine Dihydrochloride 5 MG Tab Take  by mouth.     • hydrOXYzine HCl (ATARAX) 25 MG Tab Take 1 Tab by mouth 3 times a day as needed for Itching. 20 Tab 0   • esomeprazole (NEXIUM) 20 MG capsule Take 40 mg by mouth every day.     • amLODIPine (NORVASC) 5 MG Tab Take 5 mg by mouth every day.     • tizanidine (ZANAFLEX) 4 MG Tab Take 4 mg by mouth at bedtime as needed.     • fexofenadine (ALLEGRA) 180 MG tablet Take 180 mg by mouth 1 time daily as needed.     • fenofibrate (TRICOR) 145 MG Tab Take 145 mg by mouth every evening.     • ibuprofen (MOTRIN) 200 MG Tab Take 800 mg by mouth 1 time daily as needed for Mild Pain.     • zolpidem (AMBIEN) 10 MG TABS Take 1 Tab by mouth at bedtime as needed for Sleep. 90 Each 3   • doxycycline (VIBRAMYCIN) 100 MG Tab Take 1 Tablet by mouth 2 times a day for 10 days. 20 Tablet 1   • montelukast (SINGULAIR) 10 MG Tab Take 1 tablet by mouth every day. 90 tablet 3     No current facility-administered medications on file prior to visit.       Patient has no known allergies.      ROS:   Review of Systems   Constitutional: Negative for chills, diaphoresis, fever, malaise/fatigue and weight loss.   HENT: Negative for congestion, ear discharge, ear pain, hearing loss, nosebleeds, sinus pain, sore throat and tinnitus.    Eyes: Negative for blurred vision, double vision, photophobia, pain, discharge and redness.   Respiratory: Negative for cough, hemoptysis, sputum production, shortness of breath, wheezing and stridor.    Cardiovascular: Negative for chest pain, palpitations, orthopnea, claudication, leg swelling and PND.   Gastrointestinal: Negative for abdominal  "pain, constipation, diarrhea, heartburn, nausea and vomiting.   Genitourinary: Negative for dysuria and urgency.   Musculoskeletal: Negative for back pain, falls, joint pain, myalgias and neck pain.   Skin: Negative for itching and rash.   Neurological: Negative for dizziness, tremors, speech change, focal weakness, weakness and headaches.   Endo/Heme/Allergies: Negative for environmental allergies.   Psychiatric/Behavioral: Negative for depression.       /76 (BP Location: Right arm, Patient Position: Sitting, BP Cuff Size: Large adult)   Pulse 85   Temp 36.4 °C (97.5 °F) (Temporal)   Resp 18   Ht 1.727 m (5' 8\")   Wt (!) 144 kg (317 lb)   SpO2 97%   Physical Exam  Vitals reviewed.   Constitutional:       General: He is not in acute distress.     Appearance: Normal appearance. He is obese.   HENT:      Head: Normocephalic and atraumatic.      Right Ear: External ear normal.      Left Ear: External ear normal.      Nose: Nose normal. No congestion.      Mouth/Throat:      Mouth: Mucous membranes are moist.      Pharynx: Oropharynx is clear. No oropharyngeal exudate.   Eyes:      General: No scleral icterus.     Extraocular Movements: Extraocular movements intact.      Conjunctiva/sclera: Conjunctivae normal.      Pupils: Pupils are equal, round, and reactive to light.   Cardiovascular:      Rate and Rhythm: Normal rate and regular rhythm.      Heart sounds: Normal heart sounds. No murmur heard.      Pulmonary:      Effort: Pulmonary effort is normal. No respiratory distress.      Breath sounds: Normal breath sounds. No wheezing or rales.   Abdominal:      Palpations: Abdomen is soft.      Comments: Abdominal obesity   Musculoskeletal:         General: Normal range of motion.      Cervical back: Normal range of motion and neck supple.      Right lower leg: No edema.      Left lower leg: No edema.   Skin:     General: Skin is warm and dry.      Findings: No rash.   Neurological:      Mental Status: He is " alert and oriented to person, place, and time.      Cranial Nerves: No cranial nerve deficit.   Psychiatric:         Mood and Affect: Mood normal.         Behavior: Behavior normal.         PFTs as reviewed by me personally: none    Imaging as reviewed by me personally:  As per hPI    Assessment:  1. Intermittent asthma, unspecified asthma severity, unspecified whether complicated  fluticasone-salmeterol (ADVAIR) 100-50 MCG/DOSE AEROSOL POWDER, BREATH ACTIVATED    albuterol (VENTOLIN HFA) 108 (90 Base) MCG/ACT Aero Soln inhalation aerosol   2. JOSSY (obstructive sleep apnea)     3. Class 3 severe obesity with body mass index (BMI) of 45.0 to 49.9 in adult, unspecified obesity type, unspecified whether serious comorbidity present (HCC)         Plan:  1. Chronic. Currently well controlled. PFTs pending, up to date on vaccines. Ventolin written for, continue low dose advair  2. Seen sleep, pending CPAP therapy.   3. Encouraged healthy lifestyle habits.   Return in about 6 months (around 5/30/2022) for please schedule PFTs .

## 2021-12-08 ENCOUNTER — NON-PROVIDER VISIT (OUTPATIENT)
Dept: SLEEP MEDICINE | Facility: MEDICAL CENTER | Age: 56
End: 2021-12-08
Payer: COMMERCIAL

## 2021-12-08 ENCOUNTER — TELEPHONE (OUTPATIENT)
Dept: SLEEP MEDICINE | Facility: MEDICAL CENTER | Age: 56
End: 2021-12-08

## 2021-12-08 VITALS — WEIGHT: 315 LBS | HEIGHT: 67 IN | BODY MASS INDEX: 49.44 KG/M2

## 2021-12-08 DIAGNOSIS — J45.40 MODERATE PERSISTENT ASTHMA WITHOUT COMPLICATION: ICD-10-CM

## 2021-12-08 PROCEDURE — 94726 PLETHYSMOGRAPHY LUNG VOLUMES: CPT | Performed by: INTERNAL MEDICINE

## 2021-12-08 PROCEDURE — 94060 EVALUATION OF WHEEZING: CPT | Performed by: INTERNAL MEDICINE

## 2021-12-08 PROCEDURE — 94729 DIFFUSING CAPACITY: CPT | Performed by: INTERNAL MEDICINE

## 2021-12-08 ASSESSMENT — PULMONARY FUNCTION TESTS
FEV1/FVC_PERCENT_PREDICTED: 97
FEV1_PERCENT_PREDICTED: 85
FEV1/FVC_PERCENT_CHANGE: 3
FEV1/FVC_PERCENT_PREDICTED: 98
FEV1/FVC: 76.82
FEV1/FVC: 74
FVC_PERCENT_PREDICTED: 87
FEV1/FVC_PERCENT_LLN: 66
FEV1: 2.95
FEV1_PERCENT_PREDICTED: 82
FEV1_PREDICTED: 3.45
FEV1: 2.86
FVC: 3.84
FEV1_PERCENT_CHANGE: 3
FEV1/FVC_PERCENT_PREDICTED: 94
FVC: 3.84
FEV1/FVC: 74
FVC_PERCENT_PREDICTED: 87
FEV1_LLN: 2.88
FEV1/FVC_PREDICTED: 79
FEV1/FVC_PERCENT_PREDICTED: 94
FVC_PREDICTED: 4.37
FEV1/FVC: 77
FEV1/FVC_PERCENT_PREDICTED: 79
FEV1_PERCENT_CHANGE: 0
FVC_LLN: 3.65

## 2021-12-08 ASSESSMENT — FIBROSIS 4 INDEX: FIB4 SCORE: 1.5

## 2021-12-08 NOTE — PROCEDURES
Technician: KEATON Boudreaux    Technician Comment:  Good patient effort & cooperation.  The results of this test meet the ATS/ERS standards for acceptability & reproducibility.  Test was performed on the [employer] Body Plethysmograph-Elite DX system.  Predicted values were GLI-2012 for spirometry, GLI-2017 for DLCO, ITS for Lung Volumes.  The DLCO was uncorrected for Hgb.  A bronchodilator of Ventolin HFA -2puffs via spacer administered.  DLCO performed during dilation period.    Interpretation:  Baseline spirometry shows low normal airflows.    No significant bronchodilator response.  Lung volumes are within normal limits.  Diffusion capacity is mildly elevated at 128% predicted.    Overall pulmonary function testing is within normal limits.  This does not rule out reactive airways disease.  Correlate clinically.

## 2021-12-08 NOTE — TELEPHONE ENCOUNTER
MEDICATION PRIOR AUTHORIZATION NEEDED:    1. Name of Medication: Ventolin HFA 90 mcg    2. Requested By (Name of Pharmacy): [facility]     3. Is insurance on file current? yes    4. What is the name & phone number of the 3rd party payor? [employer] [phone]

## 2022-02-17 ENCOUNTER — HOSPITAL ENCOUNTER (OUTPATIENT)
Dept: LAB | Facility: MEDICAL CENTER | Age: 57
End: 2022-02-17
Attending: INTERNAL MEDICINE
Payer: COMMERCIAL

## 2022-02-17 LAB
ALBUMIN SERPL BCP-MCNC: 4.3 G/DL (ref 3.2–4.9)
ALBUMIN/GLOB SERPL: 1.3 G/DL
ALP SERPL-CCNC: 69 U/L (ref 30–99)
ALT SERPL-CCNC: 37 U/L (ref 2–50)
ANION GAP SERPL CALC-SCNC: 12 MMOL/L (ref 7–16)
AST SERPL-CCNC: 37 U/L (ref 12–45)
BILIRUB SERPL-MCNC: 0.5 MG/DL (ref 0.1–1.5)
BUN SERPL-MCNC: 11 MG/DL (ref 8–22)
CALCIUM SERPL-MCNC: 9.6 MG/DL (ref 8.5–10.5)
CHLORIDE SERPL-SCNC: 103 MMOL/L (ref 96–112)
CHOLEST SERPL-MCNC: 206 MG/DL (ref 100–199)
CO2 SERPL-SCNC: 23 MMOL/L (ref 20–33)
CREAT SERPL-MCNC: 0.73 MG/DL (ref 0.5–1.4)
EST. AVERAGE GLUCOSE BLD GHB EST-MCNC: 134 MG/DL
FASTING STATUS PATIENT QL REPORTED: NORMAL
GLOBULIN SER CALC-MCNC: 3.4 G/DL (ref 1.9–3.5)
GLUCOSE SERPL-MCNC: 111 MG/DL (ref 65–99)
HBA1C MFR BLD: 6.3 % (ref 4–5.6)
HCV AB SER QL: NORMAL
HDLC SERPL-MCNC: 39 MG/DL
LDLC SERPL CALC-MCNC: 137 MG/DL
POTASSIUM SERPL-SCNC: 4.5 MMOL/L (ref 3.6–5.5)
PROT SERPL-MCNC: 7.7 G/DL (ref 6–8.2)
PSA SERPL-MCNC: 0.77 NG/ML (ref 0–4)
SODIUM SERPL-SCNC: 138 MMOL/L (ref 135–145)
TRIGL SERPL-MCNC: 150 MG/DL (ref 0–149)

## 2022-02-17 PROCEDURE — 36415 COLL VENOUS BLD VENIPUNCTURE: CPT

## 2022-02-17 PROCEDURE — 83036 HEMOGLOBIN GLYCOSYLATED A1C: CPT

## 2022-02-17 PROCEDURE — 86803 HEPATITIS C AB TEST: CPT

## 2022-02-17 PROCEDURE — 80061 LIPID PANEL: CPT

## 2022-02-17 PROCEDURE — 80053 COMPREHEN METABOLIC PANEL: CPT

## 2022-02-17 PROCEDURE — 84153 ASSAY OF PSA TOTAL: CPT

## 2022-03-22 ENCOUNTER — OFFICE VISIT (OUTPATIENT)
Dept: SLEEP MEDICINE | Facility: MEDICAL CENTER | Age: 57
End: 2022-03-22
Payer: COMMERCIAL

## 2022-03-22 VITALS
RESPIRATION RATE: 16 BRPM | SYSTOLIC BLOOD PRESSURE: 138 MMHG | BODY MASS INDEX: 46.07 KG/M2 | OXYGEN SATURATION: 94 % | WEIGHT: 304 LBS | HEIGHT: 68 IN | DIASTOLIC BLOOD PRESSURE: 90 MMHG | HEART RATE: 76 BPM

## 2022-03-22 DIAGNOSIS — G47.33 OSA ON CPAP: ICD-10-CM

## 2022-03-22 PROCEDURE — 99214 OFFICE O/P EST MOD 30 MIN: CPT | Performed by: PREVENTIVE MEDICINE

## 2022-03-22 ASSESSMENT — FIBROSIS 4 INDEX: FIB4 SCORE: 1.48

## 2022-03-22 ASSESSMENT — PATIENT HEALTH QUESTIONNAIRE - PHQ9: CLINICAL INTERPRETATION OF PHQ2 SCORE: 0

## 2022-03-22 NOTE — PROGRESS NOTES
CC: Compliance check    Last seen: Itz 9/22/21 ordered new CPAP    HPI:  French Hurd is a 56 y.o.male  this patient has a past medical history that includes asthma that is generally triggered by exercise and environmental allergies.  Currently he uses albuterol approximately once per week.  He has also been diagnosed with hypertension, dyslipidemia, obesity and back pain.  Surgical history includes C-spine fusion and multiple orthopedic surgeries.  He has less than a 5 pack year of cigarette smoking and quit in the 1980s.  He currently works for the [employer] and does shift work.  He no longer smokes.  He does drink at least 1 alcoholic beverage per week and he will have 2-3 caffeinated beverages daily.      First sleep study results:  TYPE: Split-night in lab  DATE: May 30, 2016  Diagnostic:AHI: 102.3   diagnostic  Oxygen Juan: 50.0%  TREATMENT AHI: 7.8  TREATMENT Oxygen Juan: 83.0% and 9.4 minutes under 88% in the total sleep time  TITRATION: CPAP from 9-18 cmH2O.     Based on that study the patient was started on CPAP of 18 cm water pressure.  Patient started Pap therapy in 2016.      COMPLIANCE DATA:   70%  Machine type:  AirSense 11 Autoset  Date range:02192022-3/20/22  AHI: 0.4  TIME USED:7 hours 56 min  PRESSURE SETTINGS:  16 -20  cmH20  LEAK:  Mask leak  Other:  He uses a different machine when working in [place], [place].  He will bring other SD card to next visit.    The patient reports improved symptoms using positive airway pressure. Has experienced no  pressure tolerance, excessive airway dryness, nasal congestion, aerophagia, or chest pain.     He reports significant problems with mask fit, mask leak. .  He was sent the machine and supplies in the mail.  He is not happy about the delivery mechanism.    Patient Active Problem List    Diagnosis Date Noted   • Prediabetes 10/24/2020   • Cellulitis of left lower extremity 10/23/2020   • Psoriasis 10/23/2020   • Mild persistent asthma with acute  exacerbation 05/21/2017   • BMI 45.0-49.9, adult ([facility]) 09/22/2015   • Bronchitis with bronchospasm 08/25/2015   • Knee pain 12/23/2014   • Asthma in adult 04/24/2014   • Elevated blood sugar 01/23/2014   • Leg pain, left 10/10/2013   • Vitamin d deficiency 01/10/2012   • GERD (gastroesophageal reflux disease) 06/15/2011   • Tinea pedis of both feet 02/03/2011   • ED (erectile dysfunction) 02/03/2011   • Environmental allergies 02/03/2011   • Insomnia 03/16/2010   • Hypertension    • Obesity    • Osteoarthritis    • Obstructive sleep apnea    • Hypercholesteremia    • Cervical spine degeneration        Past Medical History:   Diagnosis Date   • Allergy    • ASTHMA    • Back pain    • BPH (benign prostatic hypertrophy)    • Breast mass     right side between 11 and 12 o'clock at nipple   • Cervical spine degeneration    • Chest pain    • Difficulty breathing    • Earache    • ED (erectile dysfunction)    • High cholesterol    • Hypercholesteremia    • Hypertension    • Obesity    • Osteoarthritis    • Prediabetes    • Right arm pain    • Ringing in ears    • Sleep apnea     on CPAP   • Sweat, sweating, excessive    • Wears glasses         Past Surgical History:   Procedure Laterality Date   • COLONOSCOPY - ENDO  8/9/2019    Procedure: COLONOSCOPY - AVERAGE RISK SCREENING;  Surgeon: Gopal Dinh M.D.;  Location: SURGERY [facility];  Service: Gastroenterology   • HARDWARE REMOVAL ORTHO Right 2017    right hand   • CERVICAL DISK AND FUSION ANTERIOR  2017    C5,6,7   • APPENDECTOMY  2001   • OTHER ORTHOPEDIC SURGERY Left 1993    L wrist, R hand-4th and 5th metacarpal   • LIGAMENT RECONSTRUCTION Left 1991    ulna   • ORIF, ANKLE Right 1987   • HARDWARE REMOVAL ORTHO Right 1987    right ankle   • TONSILLECTOMY  1983   • TONSILLECTOMY     • VASECTOMY      x2       Family History   Problem Relation Age of Onset   • Sleep Apnea Father    • Sleep Apnea Sister        Social History     Socioeconomic History   •  Marital status:      Spouse name: Not on file   • Number of children: Not on file   • Years of education: Not on file   • Highest education level: Not on file   Occupational History     Employer: OTHER     Comment: pt is adopted and does not know family history   Tobacco Use   • Smoking status: Former Smoker     Packs/day: 0.50     Years: 0.50     Pack years: 0.25     Types: Cigarettes     Quit date: 1982     Years since quittin.1   • Smokeless tobacco: Never Used   Vaping Use   • Vaping Use: Never used   Substance and Sexual Activity   • Alcohol use: Yes     Alcohol/week: 1.8 oz     Types: 3 Standard drinks or equivalent per week   • Drug use: No     Comment: hx of ephedirn for weight loss over 2 years ago   • Sexual activity: Yes     Partners: Female   Other Topics Concern   • Not on file   Social History Narrative   • Not on file     Social Determinants of Health     Financial Resource Strain: Not on file   Food Insecurity: Not on file   Transportation Needs: Not on file   Physical Activity: Not on file   Stress: Not on file   Social Connections: Not on file   Intimate Partner Violence: Not on file   Housing Stability: Not on file       Current Outpatient Medications   Medication Sig Dispense Refill   • fluticasone-salmeterol (ADVAIR) 100-50 MCG/DOSE AEROSOL POWDER, BREATH ACTIVATED Inhale 1 Puff every 12 hours. 3 Each 3   • albuterol (VENTOLIN HFA) 108 (90 Base) MCG/ACT Aero Soln inhalation aerosol Inhale 2 Puffs every four hours as needed for Shortness of Breath. 3 Each 3   • doxycycline (VIBRAMYCIN) 100 MG Tab Take 1 Tablet by mouth 2 times a day. 20 Tablet 0   • predniSONE (DELTASONE) 10 MG Tab 50 mg x 1 day; 40 mg x 1 day; 30 mg x 1 day; 20 mg x 1 day; 10 mg x 1 day 15 tablet 2   • losartan (COZAAR) 100 MG Tab Take 100 mg by mouth every day.     • Levocetirizine Dihydrochloride 5 MG Tab Take  by mouth.     • montelukast (SINGULAIR) 10 MG Tab Take 1 tablet by mouth every day. 90 tablet 3   •  "hydrOXYzine HCl (ATARAX) 25 MG Tab Take 1 Tab by mouth 3 times a day as needed for Itching. 20 Tab 0   • esomeprazole (NEXIUM) 20 MG capsule Take 40 mg by mouth every day.     • amLODIPine (NORVASC) 5 MG Tab Take 5 mg by mouth every day.     • tizanidine (ZANAFLEX) 4 MG Tab Take 4 mg by mouth at bedtime as needed.     • fexofenadine (ALLEGRA) 180 MG tablet Take 180 mg by mouth 1 time daily as needed.     • fenofibrate (TRICOR) 145 MG Tab Take 145 mg by mouth every evening.     • ibuprofen (MOTRIN) 200 MG Tab Take 800 mg by mouth 1 time daily as needed for Mild Pain.     • zolpidem (AMBIEN) 10 MG TABS Take 1 Tab by mouth at bedtime as needed for Sleep. 90 Each 3     No current facility-administered medications for this visit.    \"CURRENT RX\"    ALLERGIES: Patient has no known allergies.    ROS    Constitutional: Denies  weight loss, fatigue  Cardiovascular: Denies chest pain, tightness, palpitations, swelling in legs/feet, difficulty breathing when lying down but gets better when sitting up.   Respiratory: Denies shortness of breath during the day  Sleep: per HPI  Gastrointestinal: Denies  difficulty swallowing, heartburn.  Musculoskeletal: Denies painful joints, sore muscles, back pain.        PHYSICAL EXAM    /90 (BP Location: Left arm, Patient Position: Sitting, BP Cuff Size: Adult)   Pulse 76   Resp 16   Ht 1.727 m (5' 8\")   Wt (!) 138 kg (304 lb)   SpO2 94%   Appearance: Obese, looks stated age, no acute distress  Eyes:   EOMI  ENMT: masked  Neck: Supple, trachea midline, no masses  Respiratory effort:  No intercostal retractions or use of accessory muscles  Musculoskeletal:  Grossly normal; gait and station normal  Skin:  No cyanosis  Neurologic: oriented to person, time, place, and purpose; judgement intact  Psychiatric:  No depression, anxiety, agitation      Medical Decision Making       The medical record was reviewed in its entirety including the referral notes, records from primary care, " consultants notes, hospital records, lab, imaging, microbiology, immunology, and immunizations.  Care gaps identified and reviewed with the patient.    Diagnostic and titration nocturnal polysomnogram's, home sleep apnea tests, continuous nocturnal oximetry results, multiple sleep latency tests, and compliance reports reviewed.    ASSESSMENT/PLAN:  Pt has phone contact information for Theron Jorge at [facility].  Pt is compliant on new machine.    1. JOSSY on CPAP    - DME Other  Nasal mask  - DME Mask and Supplies      Has been advised to continue the current Pap Therapy.  Also advised to clean equipment frequently, and get new mask and supplies as allowed by insurance and DME.  Patient was advised to NEVER use  OZONE containing cleaning systems such as So Clean.    The risks of untreated sleep apnea were discussed with the patient at length. Patients with JOSSY are at increased risk of cardiovascular disease including coronary artery disease, systemic arterial hypertension, pulmonary arterial hypertension, cardiac arrhythmias, and stroke. JOSSY patients have an increased risk of motor vehicle accidents, type 2 diabetes, chronic kidney disease, and non-alcoholic liver disease. The patient was advised to avoid driving a motor vehicle when drowsy.      Have advised the patient to follow up with the appropriate healthcare practitioners for all other medical problems and issues.      RTC 1 year ANNUAL    My total time spent caring for the patient on the day of the encounter was 35 minutes. This includes time time spent on a thorough chart review including other physician notes, any type of sleep study, as well as critical labs and pulmonary and cardiac studies.  Additionally it includes discussions of good sleep hygiene, stimulus control and going over the need for consistency in terms of sleep preparation and practice.         Please note that this dictation was created using voice recognition software.  I have made every  reasonable attempt to correct obvious errors, I expect that there are errors of grammar and possibly content that I did not discover before finalizing this note.

## 2022-05-27 ENCOUNTER — OFFICE VISIT (OUTPATIENT)
Dept: SLEEP MEDICINE | Facility: MEDICAL CENTER | Age: 57
End: 2022-05-27
Payer: COMMERCIAL

## 2022-05-27 VITALS
OXYGEN SATURATION: 97 % | HEIGHT: 68 IN | BODY MASS INDEX: 46.07 KG/M2 | SYSTOLIC BLOOD PRESSURE: 142 MMHG | WEIGHT: 304 LBS | DIASTOLIC BLOOD PRESSURE: 60 MMHG | HEART RATE: 70 BPM

## 2022-05-27 DIAGNOSIS — G47.33 OSA ON CPAP: ICD-10-CM

## 2022-05-27 DIAGNOSIS — J45.40 MODERATE PERSISTENT ASTHMA WITHOUT COMPLICATION: ICD-10-CM

## 2022-05-27 PROCEDURE — 99214 OFFICE O/P EST MOD 30 MIN: CPT | Performed by: INTERNAL MEDICINE

## 2022-05-27 RX ORDER — AZITHROMYCIN 250 MG/1
TABLET, FILM COATED ORAL
Qty: 6 TABLET | Refills: 0 | Status: SHIPPED | OUTPATIENT
Start: 2022-05-27 | End: 2022-10-19

## 2022-05-27 RX ORDER — METHYLPREDNISOLONE 4 MG/1
TABLET ORAL
Qty: 21 TABLET | Refills: 0 | Status: SHIPPED | OUTPATIENT
Start: 2022-05-27 | End: 2022-10-19

## 2022-05-27 ASSESSMENT — ENCOUNTER SYMPTOMS
NECK PAIN: 0
STRIDOR: 0
ABDOMINAL PAIN: 0
DIARRHEA: 0
PALPITATIONS: 0
HEADACHES: 0
BLURRED VISION: 0
ORTHOPNEA: 0
FALLS: 0
DEPRESSION: 0
FOCAL WEAKNESS: 0
PND: 0
COUGH: 0
WEIGHT LOSS: 0
HEMOPTYSIS: 0
PHOTOPHOBIA: 0
MYALGIAS: 0
DIAPHORESIS: 0
DOUBLE VISION: 0
CONSTIPATION: 0
DIZZINESS: 0
TREMORS: 0
SHORTNESS OF BREATH: 0
SPEECH CHANGE: 0
SINUS PAIN: 0
WEAKNESS: 0
BACK PAIN: 0
EYE PAIN: 0
EYE DISCHARGE: 0
VOMITING: 0
HEARTBURN: 0
SPUTUM PRODUCTION: 0
EYE REDNESS: 0
CHILLS: 0
SORE THROAT: 0
FEVER: 0
NAUSEA: 0
CLAUDICATION: 0
WHEEZING: 0

## 2022-05-27 ASSESSMENT — FIBROSIS 4 INDEX: FIB4 SCORE: 1.48

## 2022-05-27 NOTE — PROGRESS NOTES
Chief Complaint   Patient presents with   • Follow-Up     Last ov 11/30/21  PFT 12/8/21         HPI: This patient is a 56 y.o. male whom is followed in our clinic for asthma last seen by me on 11/30/21.   The patient's past medical history is significant for asthma since childhood which was typically triggered by exercise and environmental allergies.  He reports spring and fall being typical times when symptoms would flare and he would often require treatment with antibiotics and steroids but typically only once per year. He did require prednisone 2x last fall but has not needed prednisone since then. HE has done okay this spring with only 2 weeks where he had increase in rescue TRACEY use. He is on advair 100 and prn ventolin. He is working on weight loss with dietary changes. He is followed by sleep medicine; on CPAP. PFTs from December show normal air flows, normal lung volumes and normal DLCO.     Past Medical History:   Diagnosis Date   • Allergy    • ASTHMA    • Back pain    • BPH (benign prostatic hypertrophy)    • Breast mass     right side between 11 and 12 o'clock at nipple   • Cervical spine degeneration    • Chest pain    • Difficulty breathing    • Earache    • ED (erectile dysfunction)    • High cholesterol    • Hypercholesteremia    • Hypertension    • Obesity    • Osteoarthritis    • Prediabetes    • Right arm pain    • Ringing in ears    • Sleep apnea     on CPAP   • Sweat, sweating, excessive    • Wears glasses        Social History     Socioeconomic History   • Marital status:      Spouse name: Not on file   • Number of children: Not on file   • Years of education: Not on file   • Highest education level: Not on file   Occupational History     Employer: OTHER     Comment: pt is adopted and does not know family history   Tobacco Use   • Smoking status: Former Smoker     Packs/day: 0.50     Years: 0.50     Pack years: 0.25     Types: Cigarettes     Quit date: 2/11/1982     Years since quitting:  40.3   • Smokeless tobacco: Never Used   Vaping Use   • Vaping Use: Never used   Substance and Sexual Activity   • Alcohol use: Yes     Alcohol/week: 1.2 oz     Types: 2 Standard drinks or equivalent per week   • Drug use: No     Comment: hx of ephedirn for weight loss over 2 years ago   • Sexual activity: Yes     Partners: Female   Other Topics Concern   • Not on file   Social History Narrative   • Not on file     Social Determinants of Health     Financial Resource Strain: Not on file   Food Insecurity: Not on file   Transportation Needs: Not on file   Physical Activity: Not on file   Stress: Not on file   Social Connections: Not on file   Intimate Partner Violence: Not on file   Housing Stability: Not on file       Family History   Problem Relation Age of Onset   • Sleep Apnea Father    • Sleep Apnea Sister        Current Outpatient Medications on File Prior to Visit   Medication Sig Dispense Refill   • fluticasone-salmeterol (ADVAIR) 100-50 MCG/DOSE AEROSOL POWDER, BREATH ACTIVATED Inhale 1 Puff every 12 hours. 3 Each 3   • albuterol (VENTOLIN HFA) 108 (90 Base) MCG/ACT Aero Soln inhalation aerosol Inhale 2 Puffs every four hours as needed for Shortness of Breath. 3 Each 3   • doxycycline (VIBRAMYCIN) 100 MG Tab Take 1 Tablet by mouth 2 times a day. 20 Tablet 0   • losartan (COZAAR) 100 MG Tab Take 100 mg by mouth every day.     • Levocetirizine Dihydrochloride 5 MG Tab Take  by mouth.     • montelukast (SINGULAIR) 10 MG Tab Take 1 tablet by mouth every day. 90 tablet 3   • hydrOXYzine HCl (ATARAX) 25 MG Tab Take 1 Tab by mouth 3 times a day as needed for Itching. 20 Tab 0   • esomeprazole (NEXIUM) 20 MG capsule Take 40 mg by mouth every day.     • amLODIPine (NORVASC) 5 MG Tab Take 5 mg by mouth every day.     • tizanidine (ZANAFLEX) 4 MG Tab Take 4 mg by mouth at bedtime as needed.     • fexofenadine (ALLEGRA) 180 MG tablet Take 180 mg by mouth 1 time daily as needed.     • fenofibrate (TRICOR) 145 MG Tab Take  "145 mg by mouth every evening.     • ibuprofen (MOTRIN) 200 MG Tab Take 800 mg by mouth 1 time daily as needed for Mild Pain.     • zolpidem (AMBIEN) 10 MG TABS Take 1 Tab by mouth at bedtime as needed for Sleep. 90 Each 3     No current facility-administered medications on file prior to visit.       Patient has no known allergies.      ROS:   Review of Systems   Constitutional: Negative for chills, diaphoresis, fever, malaise/fatigue and weight loss.   HENT: Negative for congestion, ear discharge, ear pain, hearing loss, nosebleeds, sinus pain, sore throat and tinnitus.    Eyes: Negative for blurred vision, double vision, photophobia, pain, discharge and redness.   Respiratory: Negative for cough, hemoptysis, sputum production, shortness of breath, wheezing and stridor.    Cardiovascular: Negative for chest pain, palpitations, orthopnea, claudication, leg swelling and PND.   Gastrointestinal: Negative for abdominal pain, constipation, diarrhea, heartburn, nausea and vomiting.   Genitourinary: Negative for dysuria and urgency.   Musculoskeletal: Negative for back pain, falls, joint pain, myalgias and neck pain.   Skin: Negative for itching and rash.   Neurological: Negative for dizziness, tremors, speech change, focal weakness, weakness and headaches.   Endo/Heme/Allergies: Negative for environmental allergies.   Psychiatric/Behavioral: Negative for depression.       BP (!) 142/60 (BP Location: Right arm, Patient Position: Sitting, BP Cuff Size: Adult long)   Pulse 70   Ht 1.727 m (5' 8\")   Wt (!) 138 kg (304 lb)   SpO2 97%   Physical Exam  Vitals reviewed.   Constitutional:       General: He is not in acute distress.     Appearance: Normal appearance. He is obese.   HENT:      Head: Normocephalic and atraumatic.      Right Ear: External ear normal.      Left Ear: External ear normal.      Nose: Nose normal. No congestion.      Mouth/Throat:      Mouth: Mucous membranes are moist.      Pharynx: Oropharynx is " clear. No oropharyngeal exudate.   Eyes:      General: No scleral icterus.     Extraocular Movements: Extraocular movements intact.      Conjunctiva/sclera: Conjunctivae normal.      Pupils: Pupils are equal, round, and reactive to light.   Cardiovascular:      Rate and Rhythm: Normal rate and regular rhythm.      Heart sounds: Normal heart sounds. No murmur heard.    No gallop.   Pulmonary:      Effort: Pulmonary effort is normal. No respiratory distress.      Breath sounds: Normal breath sounds. No wheezing or rales.   Abdominal:      General: There is no distension.      Palpations: Abdomen is soft.   Musculoskeletal:         General: Normal range of motion.      Cervical back: Normal range of motion and neck supple.      Right lower leg: No edema.      Left lower leg: No edema.   Skin:     General: Skin is warm and dry.      Findings: No rash.   Neurological:      Mental Status: He is alert and oriented to person, place, and time.      Cranial Nerves: No cranial nerve deficit.   Psychiatric:         Mood and Affect: Mood normal.         Behavior: Behavior normal.         PFTs as reviewed by me personally: as per HPI      Assessment:  1. Moderate persistent asthma without complication     2. JOSSY on CPAP         Plan:  1. Chronic, stable and sxs well controlled. Continue advair 100 and prn Balta; f/u in fall given that is when sxs worsened last year; pt given emergency medrol dose pack and azithromycin  2. Compliant with CPAP; followed by sleep medicine  Return in about 5 months (around 10/27/2022) for asthma.

## 2022-08-30 ENCOUNTER — OFFICE VISIT (OUTPATIENT)
Dept: URGENT CARE | Facility: PHYSICIAN GROUP | Age: 57
End: 2022-08-30
Payer: COMMERCIAL

## 2022-08-30 ENCOUNTER — APPOINTMENT (OUTPATIENT)
Dept: RADIOLOGY | Facility: IMAGING CENTER | Age: 57
End: 2022-08-30
Attending: PHYSICIAN ASSISTANT
Payer: COMMERCIAL

## 2022-08-30 VITALS
WEIGHT: 304 LBS | DIASTOLIC BLOOD PRESSURE: 90 MMHG | HEART RATE: 114 BPM | TEMPERATURE: 98.4 F | OXYGEN SATURATION: 97 % | RESPIRATION RATE: 16 BRPM | BODY MASS INDEX: 46.07 KG/M2 | HEIGHT: 68 IN | SYSTOLIC BLOOD PRESSURE: 164 MMHG

## 2022-08-30 DIAGNOSIS — S62.356A CLOSED NONDISPLACED FRACTURE OF SHAFT OF FIFTH METACARPAL BONE OF RIGHT HAND, INITIAL ENCOUNTER: ICD-10-CM

## 2022-08-30 DIAGNOSIS — S69.91XA INJURY OF RIGHT HAND, INITIAL ENCOUNTER: ICD-10-CM

## 2022-08-30 PROCEDURE — 99213 OFFICE O/P EST LOW 20 MIN: CPT | Performed by: PHYSICIAN ASSISTANT

## 2022-08-30 PROCEDURE — 73130 X-RAY EXAM OF HAND: CPT | Mod: TC,FY,RT | Performed by: RADIOLOGY

## 2022-08-30 ASSESSMENT — ENCOUNTER SYMPTOMS
FEVER: 0
SHORTNESS OF BREATH: 0
NAUSEA: 0
EYE REDNESS: 0
EYE DISCHARGE: 0
VOMITING: 0
SORE THROAT: 0
COUGH: 0
HEADACHES: 0

## 2022-08-30 ASSESSMENT — FIBROSIS 4 INDEX: FIB4 SCORE: 1.51

## 2022-08-30 NOTE — LETTER
August 30, 2022         Patient: French Hurd   YOB: 1965   Date of Visit: 8/30/2022           To Whom it May Concern:    French Hurd was seen in my clinic on 8/30/2022. Please excuse him work 8/30, 8/31, 9/1, 9/2, 9/3, 9/4,and 9/5. He may return to work on 9/6/2022.    If you have any questions or concerns, please don't hesitate to call.        Sincerely,           Taylor Knox P.A.-C.  Electronically Signed

## 2022-08-30 NOTE — PROGRESS NOTES
Subjective     French Hurd is a 57 y.o. male who presents with Wrist Injury (Fell, arm and wrist injury )            This is a new problem.  The patient presents to clinic secondary to an injury to his right hand following a mechanical ground-level fall.  The patient states he was attempting to remove a wasp nest earlier today.  The patient states that he walks started to become angry and fly around.  The patient states he attempted to run away.  The patient states as he was playing he tripped and fell landing on his right side.  The patient states he sustained some abrasions to his right arm as result of the fall.  The patient also injured his right hand as result of the fall.  The patient reports a history of an old boxer's fracture to his right hand.  The patient states he has developed pain and swelling to the right hand overlying the fourth and fifth metacarpals at the site of his previous injury.  He reports no associated bruising or redness.  The patient notes decreased range of motion of his right hand secondary to pain.  He reports no numbness, tingling, or weakness.  The patient reports no additional injuries as result of the fall.  The patient has not taken any OTC medications for his current symptoms.  The patient reports no associated head injury or LOC as result of the fall.    Wrist Injury   Pertinent negatives include no chest pain.     PMH:  has a past medical history of Allergy, ASTHMA, Back pain, BPH (benign prostatic hypertrophy), Breast mass, Cervical spine degeneration, Chest pain, Difficulty breathing, Earache, ED (erectile dysfunction), High cholesterol, Hypercholesteremia, Hypertension, Obesity, Osteoarthritis, Prediabetes, Right arm pain, Ringing in ears, Sleep apnea, Sweat, sweating, excessive, and Wears glasses.  MEDS:   Current Outpatient Medications:     azithromycin (ZITHROMAX) 250 MG Tab, Take 2 tablets on day 1, then take 1 tablet a day for 4 days., Disp: 6 Tablet, Rfl: 0     fluticasone-salmeterol (ADVAIR) 100-50 MCG/DOSE AEROSOL POWDER, BREATH ACTIVATED, Inhale 1 Puff every 12 hours., Disp: 3 Each, Rfl: 3    albuterol (VENTOLIN HFA) 108 (90 Base) MCG/ACT Aero Soln inhalation aerosol, Inhale 2 Puffs every four hours as needed for Shortness of Breath., Disp: 3 Each, Rfl: 3    doxycycline (VIBRAMYCIN) 100 MG Tab, Take 1 Tablet by mouth 2 times a day., Disp: 20 Tablet, Rfl: 0    losartan (COZAAR) 100 MG Tab, Take 100 mg by mouth every day., Disp: , Rfl:     Levocetirizine Dihydrochloride 5 MG Tab, Take  by mouth., Disp: , Rfl:     montelukast (SINGULAIR) 10 MG Tab, Take 1 tablet by mouth every day., Disp: 90 tablet, Rfl: 3    hydrOXYzine HCl (ATARAX) 25 MG Tab, Take 1 Tab by mouth 3 times a day as needed for Itching., Disp: 20 Tab, Rfl: 0    esomeprazole (NEXIUM) 20 MG capsule, Take 40 mg by mouth every day., Disp: , Rfl:     amLODIPine (NORVASC) 5 MG Tab, Take 5 mg by mouth every day., Disp: , Rfl:     tizanidine (ZANAFLEX) 4 MG Tab, Take 4 mg by mouth at bedtime as needed., Disp: , Rfl:     fexofenadine (ALLEGRA) 180 MG tablet, Take 180 mg by mouth 1 time daily as needed., Disp: , Rfl:     fenofibrate (TRICOR) 145 MG Tab, Take 145 mg by mouth every evening., Disp: , Rfl:     ibuprofen (MOTRIN) 200 MG Tab, Take 800 mg by mouth 1 time daily as needed for Mild Pain., Disp: , Rfl:     zolpidem (AMBIEN) 10 MG TABS, Take 1 Tab by mouth at bedtime as needed for Sleep., Disp: 90 Each, Rfl: 3    methylPREDNISolone (MEDROL DOSEPAK) 4 MG Tablet Therapy Pack, Take as directed. (Patient not taking: Reported on 8/30/2022), Disp: 21 Tablet, Rfl: 0  ALLERGIES: No Known Allergies  SURGHX:   Past Surgical History:   Procedure Laterality Date    COLONOSCOPY - ENDO  8/9/2019    Procedure: COLONOSCOPY - AVERAGE RISK SCREENING;  Surgeon: Gopal Dinh M.D.;  Location: SURGERY [facility];  Service: Gastroenterology    HARDWARE REMOVAL ORTHO Right 2017    right hand    CERVICAL DISK AND FUSION  "ANTERIOR  2017    C5,6,7    APPENDECTOMY  2001    OTHER ORTHOPEDIC SURGERY Left 1993    L wrist, R hand-4th and 5th metacarpal    LIGAMENT RECONSTRUCTION Left 1991    ulna    ORIF, ANKLE Right 1987    HARDWARE REMOVAL ORTHO Right 1987    right ankle    TONSILLECTOMY  1983    TONSILLECTOMY      VASECTOMY      x2     SOCHX:  reports that he quit smoking about 40 years ago. His smoking use included cigarettes. He has a 0.25 pack-year smoking history. He has never used smokeless tobacco. He reports current alcohol use of about 1.2 oz per week. He reports that he does not use drugs.  FH: Family history was reviewed, no pertinent findings to report    Review of Systems   Constitutional:  Negative for fever.   HENT:  Negative for congestion, ear pain and sore throat.    Eyes:  Negative for discharge and redness.   Respiratory:  Negative for cough and shortness of breath.    Cardiovascular:  Negative for chest pain and leg swelling.   Gastrointestinal:  Negative for nausea and vomiting.   Musculoskeletal:  Positive for joint pain.        + injury to right hand   Skin:  Negative for rash.        + abrasions of right arm   Neurological:  Negative for headaches.            Objective     BP (!) 164/90   Pulse (!) 114   Temp 36.9 °C (98.4 °F) (Temporal)   Resp 16   Ht 1.727 m (5' 8\")   Wt (!) 138 kg (304 lb)   SpO2 97%   BMI 46.22 kg/m²      Physical Exam  Constitutional:       General: He is not in acute distress.     Appearance: Normal appearance. He is well-developed. He is not ill-appearing.   HENT:      Head: Normocephalic and atraumatic.      Right Ear: External ear normal.      Left Ear: External ear normal.   Eyes:      Extraocular Movements: Extraocular movements intact.      Conjunctiva/sclera: Conjunctivae normal.   Cardiovascular:      Rate and Rhythm: Normal rate.   Pulmonary:      Effort: Pulmonary effort is normal.   Musculoskeletal:      Cervical back: Normal range of motion and neck supple.      Comments: "   Right Hand:  Tenderness to the lateral aspect of the right hand overlying the fourth and fifth metacarpals with localized edema.  No ecchymosis.  No overlying erythema.  No increased warmth.  No additional tenderness to the right hand.  No tenderness of the right wrist  And old surgical scar is present overlying the fourth and fifth metacarpals.  Superficial abrasions are present to the proximal aspect of the fourth and fifth digits.  Decreased ROM -patient demonstrates limited range of motion of the right hand  Neurovascular intact distally  Strength -not assessed secondary to pain  Right Arm:  Superficial abrasions are present to the lateral aspect of the right forearm.  No tenderness to palpation.  No bony tenderness.  No edema.  No ecchymosis.  No overlying erythema.  No increased warmth.  No active bleeding.  No visible foreign bodies.  ROM intact - the patient demonstrates full active range of motion of the right elbow  Neurovascular intact distally  Strength 5/5 -lection/extension of the right elbow against resistance   Skin:     General: Skin is warm and dry.   Neurological:      Mental Status: He is alert and oriented to person, place, and time.             Progress:  Right Hand XR:  COMPARISON: Right hand x-ray 10/2/2015     FINDINGS:  There is a transverse fracture of the mid left 5th metacarpal.     There are lucencies in the 4th and 5th metacarpals from prior fracture.     There is osteoarthritis of the 1st metacarpophalangeal, 1st interphalangeal joints, and multiple distal interphalangeal joint.     IMPRESSION:  1.  Acute, transverse fracture of the right 5th metacarpal diaphysis     2.  No other acute fracture     3.  Posttraumatic and postoperative changes of the 4th and 5th metacarpals     4.  Multiple sites of osteoarthritis     Reviewed x-ray results with the patient in clinic.    Placed the patient in an ulnar gutter splint for his acute fracture.    Will place referral to orthopedics for  further evaluation and management of the patient's acute fracture.         Assessment & Plan          1. Injury of right hand, initial encounter  - DX-HAND 3+ RIGHT; Future  - Referral to Orthopedics    2. Closed nondisplaced fracture of shaft of fifth metacarpal bone of right hand, initial encounter  - Referral to Orthopedics    Differential diagnoses, supportive care, and indications for immediate follow-up discussed with patient.   Instructed to return to clinic or nearest emergency department for any change in condition, further concerns, or worsening of symptoms.    OTC NSAIDs for pain/discomfort   RICE  Wear splint for additional support/stabilization of acute fracture  Referral to orthopedics  Follow-up with PCP   Work note provided  Return to clinic or go tot he ED if symptoms worsen or fail to improve, or if the patient should develop worsening/increasing pain/tenderness, swelling, bruising, redness or warmth to the affected area, decreased ROM, numbness, tingling or weakness, difficulty walking, fever/chills, and/or any concerning symptoms.     Discussed plan with the patient, and he agrees to the above.    I personally reviewed prior external notes and test results pertinent to today's visit.  I have independently reviewed and interpreted all diagnostics ordered during this urgent care visit.     Please note that this dictation was created using voice recognition software. I have made every reasonable attempt to correct obvious errors, but I expect that there may be errors of grammar and possibly content that I did not discover before finalizing the note.     This note was electronically signed by Taylor Knox PA-C

## 2022-10-07 ENCOUNTER — TELEPHONE (OUTPATIENT)
Dept: SCHEDULING | Facility: IMAGING CENTER | Age: 57
End: 2022-10-07

## 2022-10-19 ENCOUNTER — OFFICE VISIT (OUTPATIENT)
Dept: MEDICAL GROUP | Facility: PHYSICIAN GROUP | Age: 57
End: 2022-10-19
Payer: COMMERCIAL

## 2022-10-19 VITALS
RESPIRATION RATE: 20 BRPM | OXYGEN SATURATION: 97 % | HEART RATE: 95 BPM | BODY MASS INDEX: 47.74 KG/M2 | HEIGHT: 68 IN | WEIGHT: 315 LBS | TEMPERATURE: 98.3 F

## 2022-10-19 DIAGNOSIS — J45.20 MILD INTERMITTENT ASTHMA WITHOUT COMPLICATION: Chronic | ICD-10-CM

## 2022-10-19 DIAGNOSIS — R53.83 OTHER FATIGUE: ICD-10-CM

## 2022-10-19 DIAGNOSIS — L40.9 PSORIASIS: ICD-10-CM

## 2022-10-19 DIAGNOSIS — K21.9 GASTROESOPHAGEAL REFLUX DISEASE WITHOUT ESOPHAGITIS: Chronic | ICD-10-CM

## 2022-10-19 DIAGNOSIS — R10.12 LUQ PAIN: ICD-10-CM

## 2022-10-19 DIAGNOSIS — E78.5 DYSLIPIDEMIA: ICD-10-CM

## 2022-10-19 DIAGNOSIS — I10 PRIMARY HYPERTENSION: ICD-10-CM

## 2022-10-19 DIAGNOSIS — R19.5 LOOSE STOOLS: ICD-10-CM

## 2022-10-19 DIAGNOSIS — G47.00 INSOMNIA: ICD-10-CM

## 2022-10-19 DIAGNOSIS — E11.9 CONTROLLED TYPE 2 DIABETES MELLITUS WITHOUT COMPLICATION, WITHOUT LONG-TERM CURRENT USE OF INSULIN (HCC): ICD-10-CM

## 2022-10-19 DIAGNOSIS — G47.33 OBSTRUCTIVE SLEEP APNEA: Chronic | ICD-10-CM

## 2022-10-19 PROBLEM — L03.116 CELLULITIS OF LEFT LOWER EXTREMITY: Status: RESOLVED | Noted: 2020-10-23 | Resolved: 2022-10-19

## 2022-10-19 PROBLEM — R73.03 PREDIABETES: Status: RESOLVED | Noted: 2020-10-24 | Resolved: 2022-10-19

## 2022-10-19 PROBLEM — S62.329A CLOSED FRACTURE OF SHAFT OF METACARPAL BONE: Status: ACTIVE | Noted: 2022-10-11

## 2022-10-19 PROCEDURE — 99214 OFFICE O/P EST MOD 30 MIN: CPT | Performed by: PHYSICIAN ASSISTANT

## 2022-10-19 RX ORDER — TRIAMCINOLONE ACETONIDE 5 MG/G
CREAM TOPICAL
Qty: 454 G | Refills: 1 | Status: SHIPPED | OUTPATIENT
Start: 2022-10-19

## 2022-10-19 RX ORDER — ZOLPIDEM TARTRATE 10 MG/1
10 TABLET ORAL NIGHTLY PRN
Qty: 90 EACH | Refills: 1 | Status: SHIPPED | OUTPATIENT
Start: 2022-10-19 | End: 2022-11-18

## 2022-10-19 RX ORDER — DULAGLUTIDE 1.5 MG/.5ML
1.5 INJECTION, SOLUTION SUBCUTANEOUS
COMMUNITY
End: 2022-10-19 | Stop reason: SINTOL

## 2022-10-19 RX ORDER — ESOMEPRAZOLE MAGNESIUM 40 MG/1
40 CAPSULE, DELAYED RELEASE ORAL DAILY
COMMUNITY
End: 2024-01-18 | Stop reason: SDUPTHER

## 2022-10-19 RX ORDER — METFORMIN HYDROCHLORIDE 500 MG/1
1000 TABLET, EXTENDED RELEASE ORAL DAILY
Qty: 180 TABLET | Refills: 3 | Status: SHIPPED | OUTPATIENT
Start: 2022-10-19 | End: 2024-01-18 | Stop reason: SDUPTHER

## 2022-10-19 RX ORDER — DULAGLUTIDE 0.75 MG/.5ML
0.75 INJECTION, SOLUTION SUBCUTANEOUS
COMMUNITY
End: 2022-10-19

## 2022-10-19 RX ORDER — MONTELUKAST SODIUM 10 MG/1
10 TABLET ORAL DAILY
Qty: 90 TABLET | Refills: 3 | Status: SHIPPED | OUTPATIENT
Start: 2022-10-19 | End: 2024-01-18 | Stop reason: SDUPTHER

## 2022-10-19 RX ORDER — ATORVASTATIN CALCIUM 20 MG/1
20 TABLET, FILM COATED ORAL DAILY
COMMUNITY
End: 2023-04-10 | Stop reason: SDUPTHER

## 2022-10-19 RX ORDER — CHOLESTYRAMINE 4 G/9G
1 POWDER, FOR SUSPENSION ORAL DAILY
Qty: 30 EACH | Refills: 0 | Status: SHIPPED | OUTPATIENT
Start: 2022-10-19 | End: 2022-10-19

## 2022-10-19 RX ORDER — METFORMIN HYDROCHLORIDE 500 MG/1
1000 TABLET, EXTENDED RELEASE ORAL DAILY
COMMUNITY
Start: 2022-08-11 | End: 2022-10-19 | Stop reason: SDUPTHER

## 2022-10-19 RX ORDER — CHOLESTYRAMINE 4 G/9G
1 POWDER, FOR SUSPENSION ORAL DAILY
Qty: 30 EACH | Refills: 0 | Status: SHIPPED | OUTPATIENT
Start: 2022-10-19

## 2022-10-19 ASSESSMENT — ENCOUNTER SYMPTOMS
DIARRHEA: 0
FEVER: 0
ABDOMINAL PAIN: 0
COUGH: 0
MYALGIAS: 0
DIZZINESS: 0
BRUISES/BLEEDS EASILY: 0
BLURRED VISION: 0
DEPRESSION: 0
HEADACHES: 0
PALPITATIONS: 0
VOMITING: 0
WEAKNESS: 0
SHORTNESS OF BREATH: 0
ORTHOPNEA: 0
CHILLS: 0
WEIGHT LOSS: 0
DIAPHORESIS: 0
FALLS: 0
NAUSEA: 0
SORE THROAT: 0
NERVOUS/ANXIOUS: 0

## 2022-10-19 ASSESSMENT — FIBROSIS 4 INDEX: FIB4 SCORE: 1.51

## 2022-10-19 NOTE — PROGRESS NOTES
Subjective:     CC: To establish as a new patient.    HISTORY OF THE PRESENT ILLNESS: Patient is a 57 y.o. male. This pleasant patient is here today to establish care and discuss the following:    Problem   Dyslipidemia    Chronic, stable.  Latest Labs:   Lab Results   Component Value Date/Time    CHOLSTRLTOT 206 (H) 02/17/2022 09:18 AM     (H) 02/17/2022 09:18 AM    HDL 39 (A) 02/17/2022 09:18 AM    TRIGLYCERIDE 150 (H) 02/17/2022 09:18 AM      Medications: Atorvastatin 80 mg, fenofibrate 145 mg  Medication side effects: None  Diet/Exercise: No exercise currently due to a recently fractured hand; tries to make good food choices.  Risk calculator: The 10-year ASCVD risk score (Jojo ROJAS, et al., 2019) is: 12.8%        Controlled Type 2 Diabetes Mellitus Without Complication, Without Long-Term Current Use of Insulin (Hcc)    Chronic, stable.  Current medications:  Insulin:   Biguanide: Metformin XR 1000 mg daily  GLP1-RA: Trulicity caused loose stool; may still try Ozempic, given the help with weight loss.    SGLT-2i:      DPP4-I:   TZD:   Maria Victoria:   Sulfonyluria:     Last A1c: 6.3% (2/2022)  Last Microalb/Cr ratio:  Last diabetic foot exam:  Last retinal eye exam:  ACEi/ARB?  Losartan 100 mg.  Statin?  Atorvastatin 80 mg  Aspirin?  Advised 81 mg daily  Concomitant HTN?  Yes  Nightly foot checks?  No       Loose Stools    Chronic, stable.  Patient reports urgent loose stools immediately following eating most meals.  Has not noticed any particular foods, stating it happens with most food.  Reports discomfort in the left upper quadrant before this happens.  Has been going on for at least a year or so.     Closed Fracture of Shaft of Metacarpal Bone   Psoriasis    Chronic, stable.  Uses triamcinolone as needed.  Not currently having a flare.     Mild Intermittent Asthma Without Complication    Chronic, stable.  Tolerating Advair 100/50.  Followed by pulmonology.  Has an appointment tomorrow.     BMI 45.0-49.9, adult  "(HCC)    Chronic, stable.  Knows he needs to lose weight.  Was exercising using a rower but then broke his hand.     Gerd (Gastroesophageal Reflux Disease)    Chronic, stable.  Tolerated Nexium.     Insomnia    Chronic, stable.  Tolerating Ambien 10 mg.  Having issues with the pharmacy giving him enough pills.  Asking to try sending this to his mail order pharmacy.     Hypertension    Chronic, stable.  Tolerating amlodipine 5 mg and losartan 100 mg.     Obstructive Sleep Apnea    Chronic, stable.  On CPAP.  Followed by pulmonology.  Appointment tomorrow.         Health Maintenance: Completed    ROS:   Review of Systems   Constitutional:  Positive for malaise/fatigue. Negative for chills, diaphoresis, fever and weight loss.   HENT:  Negative for hearing loss and sore throat.    Eyes:  Negative for blurred vision.   Respiratory:  Negative for cough and shortness of breath.    Cardiovascular:  Negative for chest pain, palpitations, orthopnea and leg swelling.   Gastrointestinal:  Negative for abdominal pain, diarrhea, nausea and vomiting.   Genitourinary:  Negative for dysuria, frequency, hematuria and urgency.   Musculoskeletal:  Negative for falls, joint pain and myalgias.   Skin:  Negative for rash.   Neurological:  Negative for dizziness, weakness and headaches.   Endo/Heme/Allergies:  Does not bruise/bleed easily.   Psychiatric/Behavioral:  Negative for depression. The patient is not nervous/anxious.        Objective:     Unable to obtain an accurate blood pressure as a long enough blood pressure cuff is not available at this time.    Exam: Pulse 95   Temp 36.8 °C (98.3 °F) (Temporal)   Resp 20   Ht 1.727 m (5' 8\")   Wt (!) 147 kg (324 lb 8 oz)   SpO2 97%  Body mass index is 49.34 kg/m².    Physical Exam  Constitutional:       Appearance: Normal appearance.   HENT:      Head: Normocephalic.   Eyes:      Extraocular Movements: Extraocular movements intact.      Pupils: Pupils are equal, round, and reactive to " light.   Cardiovascular:      Rate and Rhythm: Normal rate and regular rhythm.      Pulses: Normal pulses.      Heart sounds: No murmur heard.    No gallop.   Pulmonary:      Effort: No respiratory distress.      Breath sounds: No wheezing.   Abdominal:      Palpations: Abdomen is soft. There is no mass.      Tenderness: There is no abdominal tenderness.      Comments: No hepatosplenomegaly noted.  Exam is somewhat limited by body habitus.   Musculoskeletal:         General: No swelling.   Skin:     General: Skin is warm and dry.   Neurological:      General: No focal deficit present.      Mental Status: He is alert and oriented to person, place, and time.   Psychiatric:         Mood and Affect: Mood normal.         Behavior: Behavior normal.       Assessment & Plan:   57 y.o. male with the following -    1. Controlled type 2 diabetes mellitus without complication, without long-term current use of insulin (HCC)  Chronic, stable.  Metformin is causing mild loose stool.  Did not tolerate Trulicity.  It caused loose stool as well.  May still try Ozempic given its potential benefit of weight loss.  Continue metformin for now.    - HEMOGLOBIN A1C; Future  - MICROALBUMIN CREAT RATIO URINE; Future  - metFORMIN ER (GLUCOPHAGE XR) 500 MG TABLET SR 24 HR; Take 2 Tablets by mouth every day.  Dispense: 180 Tablet; Refill: 3    2. Primary hypertension  Chronic, stable.  Tolerating amlodipine 5 mg and losartan 100 mg.    - Comp Metabolic Panel; Future    3. Dyslipidemia  Chronic, stable.  Tolerating fenofibrate 145 mg and atorvastatin 20 mg.    - Lipid Profile; Future    4. Obstructive sleep apnea  Chronic, stable.  Followed by pulmonology.  Has an appointment 10/20.    5. Mild intermittent asthma without complication  Chronic, stable.  Tolerating inhalers.  Has an appoint with pulmonology tomorrow.  - montelukast (SINGULAIR) 10 MG Tab; Take 1 Tablet by mouth every day.  Dispense: 90 Tablet; Refill: 3    6. Gastroesophageal  reflux disease without esophagitis  Chronic, stable.  Tolerating Nexium.    7. BMI 45.0-49.9, adult (HCC)  Chronic, stable.  Discussed increasing activity.  Patient has a healing hand fracture.    - TSH WITH REFLEX TO FT4; Future    8. Insomnia  Chronic, stable.  Tolerating Ambien.  Asking for this to be sent to his mail order pharmacy as a local pharmacies will only give him 21 pills at a time.    - zolpidem (AMBIEN) 10 MG Tab; Take 1 Tablet by mouth at bedtime as needed for Sleep for up to 30 days.  Dispense: 90 Each; Refill: 1    9. Psoriasis  Chronic, stable.  Uses triamcinolone as needed.    - triamcinolone acetonide (KENALOG) 0.5 % Cream; Apply to affected  area up to 4 times daily as needed for rash/itching. Max use is 14 days in a row.  Dispense: 454 g; Refill: 1    10. LUQ pain  11. Loose stools  Chronic, stable.  Trial cholestyramine to see if this helps alleviate some of the symptoms.    - US-RUQ; Future  - cholestyramine (QUESTRAN) 4 g packet; Take 4 g by mouth every day.  Dispense: 30 Each; Refill: 0    12. Other fatigue  Chronic, stable.    - CBC WITH DIFFERENTIAL; Future      Return for To discuss labs.    Please note that this dictation was created using voice recognition software. I have made every reasonable attempt to correct obvious errors, but I expect that there are errors of grammar and possibly content that I did not discover before finalizing the note.

## 2022-10-20 ENCOUNTER — APPOINTMENT (OUTPATIENT)
Dept: SLEEP MEDICINE | Facility: MEDICAL CENTER | Age: 57
End: 2022-10-20
Payer: COMMERCIAL

## 2022-11-15 ENCOUNTER — APPOINTMENT (OUTPATIENT)
Dept: SLEEP MEDICINE | Facility: MEDICAL CENTER | Age: 57
End: 2022-11-15
Payer: COMMERCIAL

## 2022-11-23 ENCOUNTER — OFFICE VISIT (OUTPATIENT)
Dept: SLEEP MEDICINE | Facility: MEDICAL CENTER | Age: 57
End: 2022-11-23
Payer: COMMERCIAL

## 2022-11-23 VITALS
BODY MASS INDEX: 47.74 KG/M2 | HEART RATE: 67 BPM | HEIGHT: 68 IN | WEIGHT: 315 LBS | RESPIRATION RATE: 16 BRPM | DIASTOLIC BLOOD PRESSURE: 86 MMHG | SYSTOLIC BLOOD PRESSURE: 136 MMHG | OXYGEN SATURATION: 96 %

## 2022-11-23 DIAGNOSIS — G47.33 OBSTRUCTIVE SLEEP APNEA: ICD-10-CM

## 2022-11-23 DIAGNOSIS — J45.20 MILD INTERMITTENT ASTHMA WITHOUT COMPLICATION: ICD-10-CM

## 2022-11-23 DIAGNOSIS — K21.9 GASTROESOPHAGEAL REFLUX DISEASE WITHOUT ESOPHAGITIS: ICD-10-CM

## 2022-11-23 DIAGNOSIS — Z91.09 ENVIRONMENTAL ALLERGIES: ICD-10-CM

## 2022-11-23 PROCEDURE — 99213 OFFICE O/P EST LOW 20 MIN: CPT

## 2022-11-23 RX ORDER — ALBUTEROL SULFATE 2.5 MG/3ML
2.5 SOLUTION RESPIRATORY (INHALATION) EVERY 4 HOURS PRN
Qty: 120 ML | Refills: 11 | Status: SHIPPED | OUTPATIENT
Start: 2022-11-23

## 2022-11-23 RX ORDER — ALBUTEROL SULFATE 90 UG/1
2 AEROSOL, METERED RESPIRATORY (INHALATION) EVERY 4 HOURS PRN
Qty: 3 EACH | Refills: 3 | Status: SHIPPED | OUTPATIENT
Start: 2022-11-23

## 2022-11-23 RX ORDER — FLUTICASONE PROPIONATE AND SALMETEROL 100; 50 UG/1; UG/1
1 POWDER RESPIRATORY (INHALATION) 2 TIMES DAILY
Qty: 3 EACH | Refills: 3 | Status: SHIPPED | OUTPATIENT
Start: 2022-11-23 | End: 2024-01-18 | Stop reason: SDUPTHER

## 2022-11-23 ASSESSMENT — ENCOUNTER SYMPTOMS
WEAKNESS: 0
SPUTUM PRODUCTION: 0
VOMITING: 0
DIZZINESS: 0
CHILLS: 0
HEARTBURN: 0
WHEEZING: 0
SHORTNESS OF BREATH: 0
MYALGIAS: 0
DIARRHEA: 0
FALLS: 0
SINUS PAIN: 0
COUGH: 0
PALPITATIONS: 0
HEMOPTYSIS: 0
FEVER: 0
DIAPHORESIS: 0
HEADACHES: 0
NAUSEA: 0

## 2022-11-23 ASSESSMENT — FIBROSIS 4 INDEX: FIB4 SCORE: 1.51

## 2022-11-23 NOTE — PROGRESS NOTES
Pulmonary Clinic Note    Date of Visit: 11/23/2022     Chief Complaint:  Chief Complaint   Patient presents with    Follow-Up     Last seen 5/27/22 Dr. Marques     HPI:   French Hurd is a very pleasant 57 y.o. year old male former smoker (0.25 pack-years, quit in 1982), with a PMHx of asthma, GERD, environmental allergies, JOSSY on CPAP, insomnia who presented to the Pulmonary Clinic for a regular follow up. Last seen in the office on 5/27/2022 with Dr. Marques.     Patient is followed by the pulmonary office for asthma.  PFTs in 2021 show normal spirometry with a FVC of 3.84 L or 87%, FEV1 2.86 L or 82%, FEV1/FVC 74%, %, TLC 95%, and DLCO 128% predicted.  Patient has a history of childhood asthma.  Triggers include exercise, cold weather, and environmental allergies.    Interval events:  11/23/2022-  Patient presents stating that he has been doing well.  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  Patient states that he has been using his Advair as prescribed as well as his Singulair.  He continues to use his rescue albuterol inhaler at most once a day with exertion.  He uses a nebulizer rarely, usually when the weather changes.  His oxygen saturations have never dropped, he is compliant with his medication regimen, and has not needed to be emergently hospitalized during an exacerbation.  Symptomatically, he denies any shortness of breath with mMRC of 0, denies any cough or sputum production.  He does have an occasional wheezing during exercise.  He has a history of GERD, which is well controlled on Nexium.  He also has a history of seasonal and environmental allergies, which she takes Singulair and Allegra for.  This is also been well controlled.  He is due for influenza and pneumococcal vaccine, but unfortunately we are out of stock today in the office but did advise the patient to receive both of these  vaccines.      Exacerbations this year:  0    Current medication regimen: Advair 100, singulair, and albuterol inhaler/nebulizer as needed    MMRC Grade:  0- Breathless only during strenuous exercise    Past Medical History:   Diagnosis Date    Allergy     ASTHMA     Back pain     BPH (benign prostatic hypertrophy)     Breast mass     right side between 11 and 12 o'clock at nipple    Cervical spine degeneration     Chest pain     Difficulty breathing     Earache     ED (erectile dysfunction)     High cholesterol     Hypercholesteremia     Hypertension     Obesity     Osteoarthritis     Prediabetes     Right arm pain     Ringing in ears     Sleep apnea     on CPAP    Sweat, sweating, excessive     Wears glasses      Past Surgical History:   Procedure Laterality Date    COLONOSCOPY - ENDO  2019    Procedure: COLONOSCOPY - AVERAGE RISK SCREENING;  Surgeon: Gopal Dinh M.D.;  Location: SURGERY [facility];  Service: Gastroenterology    HARDWARE REMOVAL ORTHO Right     right hand    CERVICAL DISK AND FUSION ANTERIOR  2017    C5,6,7    APPENDECTOMY      OTHER ORTHOPEDIC SURGERY Left     L wrist, R hand-4th and 5th metacarpal    LIGAMENT RECONSTRUCTION Left     ulna    ORIF, ANKLE Right     HARDWARE REMOVAL ORTHO Right     right ankle    TONSILLECTOMY  1983    TONSILLECTOMY      VASECTOMY      x2     Social History     Socioeconomic History    Marital status:      Spouse name: Not on file    Number of children: Not on file    Years of education: Not on file    Highest education level: Not on file   Occupational History     Employer: OTHER     Comment: pt is adopted and does not know family history   Tobacco Use    Smoking status: Former     Packs/day: 0.50     Years: 0.50     Pack years: 0.25     Types: Cigarettes     Quit date: 1982     Years since quittin.8    Smokeless tobacco: Never   Vaping Use    Vaping Use: Never used   Substance and Sexual Activity     Alcohol use: Yes     Alcohol/week: 1.2 oz     Types: 2 Standard drinks or equivalent per week    Drug use: No     Comment: hx of ephedirn for weight loss over 2 years ago    Sexual activity: Yes     Partners: Female   Other Topics Concern    Not on file   Social History Narrative    Not on file     Social Determinants of Health     Financial Resource Strain: Not on file   Food Insecurity: Not on file   Transportation Needs: Not on file   Physical Activity: Not on file   Stress: Not on file   Social Connections: Not on file   Intimate Partner Violence: Not on file   Housing Stability: Not on file        Family History   Problem Relation Age of Onset    Sleep Apnea Father     Sleep Apnea Sister      Current Outpatient Medications on File Prior to Visit   Medication Sig Dispense Refill    atorvastatin (LIPITOR) 20 MG Tab Take 20 mg by mouth every day.      esomeprazole (NEXIUM) 40 MG delayed-release capsule Take 40 mg by mouth every day.      metFORMIN ER (GLUCOPHAGE XR) 500 MG TABLET SR 24 HR Take 2 Tablets by mouth every day. 180 Tablet 3    triamcinolone acetonide (KENALOG) 0.5 % Cream Apply to affected  area up to 4 times daily as needed for rash/itching. Max use is 14 days in a row. 454 g 1    cholestyramine (QUESTRAN) 4 g packet Take 4 g by mouth every day. 30 Each 0    montelukast (SINGULAIR) 10 MG Tab Take 1 Tablet by mouth every day. 90 Tablet 3    losartan (COZAAR) 100 MG Tab Take 100 mg by mouth every day.      Levocetirizine Dihydrochloride 5 MG Tab Take  by mouth.      hydrOXYzine HCl (ATARAX) 25 MG Tab Take 1 Tab by mouth 3 times a day as needed for Itching. 20 Tab 0    amLODIPine (NORVASC) 5 MG Tab Take 5 mg by mouth every day.      tizanidine (ZANAFLEX) 4 MG Tab Take 4 mg by mouth at bedtime as needed.      fexofenadine (ALLEGRA) 180 MG tablet Take 180 mg by mouth 1 time daily as needed.      fenofibrate (TRICOR) 145 MG Tab Take 145 mg by mouth every evening.      ibuprofen (MOTRIN) 200 MG Tab Take 800  "mg by mouth 1 time daily as needed for Mild Pain.       No current facility-administered medications on file prior to visit.     Allergies: Patient has no known allergies.    ROS:   Review of Systems   Constitutional:  Negative for chills, diaphoresis, fever and malaise/fatigue.   HENT:  Negative for congestion and sinus pain.    Respiratory:  Negative for cough, hemoptysis, sputum production, shortness of breath and wheezing.    Cardiovascular:  Negative for chest pain, palpitations and leg swelling.   Gastrointestinal:  Negative for diarrhea, heartburn, nausea and vomiting.   Musculoskeletal:  Negative for falls and myalgias.   Neurological:  Negative for dizziness, weakness and headaches.     Vitals:  /86 (BP Location: Right arm, Patient Position: Sitting, BP Cuff Size: Large adult)   Pulse 67   Resp 16   Ht 1.727 m (5' 8\")   Wt (!) 145 kg (320 lb)   SpO2 96%     Physical Exam:  Physical Exam  Constitutional:       General: He is not in acute distress.     Appearance: Normal appearance. He is not ill-appearing, toxic-appearing or diaphoretic.   Cardiovascular:      Rate and Rhythm: Normal rate and regular rhythm.      Heart sounds: No murmur heard.    No friction rub. No gallop.   Pulmonary:      Effort: No respiratory distress.      Breath sounds: Normal breath sounds. No stridor. No wheezing, rhonchi or rales.   Musculoskeletal:         General: No swelling.      Right lower leg: No edema.      Left lower leg: No edema.   Skin:     General: Skin is warm.   Neurological:      General: No focal deficit present.      Mental Status: He is alert and oriented to person, place, and time.   Psychiatric:         Mood and Affect: Mood normal.         Behavior: Behavior normal.         Thought Content: Thought content normal.         Judgment: Judgment normal.       Laboratory Data:  PFTs (Date: 12/8/2021)-      Impression:  Baseline spirometry shows low normal airflows.    No significant bronchodilator " response.  Lung volumes are within normal limits.  Diffusion capacity is mildly elevated at 128% predicted.     Overall pulmonary function testing is within normal limits.  This does not rule out reactive airways disease.  Correlate clinically.    CXR: (Date: 11/25/2020)-  Impression:  No acute cardiac or pulmonary abnormalities are identified.    ECHO: (Date: 2/19/2019)-  Impression:  TDS - Body Habitus, patient refused echo contrast.   No prior study is available for comparison.   Normal left ventricular systolic function.  Left ventricular ejection fraction is visually estimated to be 60%.  Normal diastolic function.  No significant valve abnormalities.   Unable to estimate pulmonary artery pressure due to an inadequate   tricuspid regurgitant jet.      Assessment and Plan:    Problem List Items Addressed This Visit       Mild intermittent asthma without complication     PFTs in 2021 show normal spirometry with a FVC of 3.84 L or 87%, FEV1 2.86 L or 82%, FEV1/FVC 74%, %, TLC 95%, and DLCO 128% predicted.  Patient has a history of childhood asthma.  Triggers include exercise, cold weather, and environmental allergies.  Symptoms include been well controlled and denies any significant shortness of breath with mMRC of 0, cough, sputum production.  He will have occasional wheezing with exercise.  The patient has not had any exacerbations this year.  -- Continue Advair 100  -- Continue Singulair  -- Continue albuterol inhaler and nebulizer as needed  -- Advised patient to increase exercise as tolerated  -- Educated patient to remain fully vaccinated against influenza and pneumococcal, which she is due for both.  Unfortunately, we are out of stock today in the office.  Advised the patient to receive both of these vaccines.         Relevant Medications    albuterol (PROVENTIL) 2.5mg/3ml Nebu Soln solution for nebulization    albuterol (VENTOLIN HFA) 108 (90 Base) MCG/ACT Aero Soln inhalation aerosol     fluticasone-salmeterol (ADVAIR DISKUS) 100-50 MCG/ACT AEROSOL POWDER, BREATH ACTIVATED    Environmental allergies     This is chronic condition, which is well controlled on Singulair and Allegra.  -- Continue Singulair and Allegra         Obstructive sleep apnea     Patient states he finds benefit with the CPAP machine and is compliant.  -- Continue to use CPAP machine  -- Continue follow-up sleep center         GERD (gastroesophageal reflux disease)     This is a chronic condition, symptoms have been well controlled on Nexium.  -- Continue Nexium  -- Lifestyle modifications such as: keep head of the bed elevated at 30 degrees, avoid cafeinated drinks when possible even during the day, avoid eating anything 2 hours prior to going to bed            Diagnostic studies have been reviewed with the patient.    Return in about 6 months (around 5/23/2023), or if symptoms worsen or fail to improve.     This note was generated using voice recognition software which has a chance of producing errors of grammar and possibly content.  I have made every reasonable attempt to find and correct any obvious errors, but it should be expected that some may not be found prior to finalization of this note.    Time spent in record review prior to patient arrival, reviewing results, and in face-to-face encounter totaled 29 min.  __________  JACKI Herring  Pulmonary Medicine  [facility]

## 2022-11-23 NOTE — ASSESSMENT & PLAN NOTE
Patient states he finds benefit with the CPAP machine and is compliant.  -- Continue to use CPAP machine  -- Continue follow-up sleep center

## 2022-11-23 NOTE — ASSESSMENT & PLAN NOTE
This is chronic condition, which is well controlled on Singulair and Allegra.  -- Continue Singulair and Allegra

## 2022-11-23 NOTE — ASSESSMENT & PLAN NOTE
This is a chronic condition, symptoms have been well controlled on Nexium.  -- Continue Nexium  -- Lifestyle modifications such as: keep head of the bed elevated at 30 degrees, avoid cafeinated drinks when possible even during the day, avoid eating anything 2 hours prior to going to bed

## 2022-11-23 NOTE — ASSESSMENT & PLAN NOTE
PFTs in 2021 show normal spirometry with a FVC of 3.84 L or 87%, FEV1 2.86 L or 82%, FEV1/FVC 74%, %, TLC 95%, and DLCO 128% predicted.  Patient has a history of childhood asthma.  Triggers include exercise, cold weather, and environmental allergies.  Symptoms include been well controlled and denies any significant shortness of breath with mMRC of 0, cough, sputum production.  He will have occasional wheezing with exercise.  The patient has not had any exacerbations this year.  -- Continue Advair 100  -- Continue Singulair  -- Continue albuterol inhaler and nebulizer as needed  -- Advised patient to increase exercise as tolerated  -- Educated patient to remain fully vaccinated against influenza and pneumococcal, which she is due for both.  Unfortunately, we are out of stock today in the office.  Advised the patient to receive both of these vaccines.

## 2022-11-28 DIAGNOSIS — R10.12 LUQ PAIN: ICD-10-CM

## 2022-11-30 ENCOUNTER — HOSPITAL ENCOUNTER (OUTPATIENT)
Dept: RADIOLOGY | Facility: MEDICAL CENTER | Age: 57
End: 2022-11-30
Attending: PHYSICIAN ASSISTANT
Payer: COMMERCIAL

## 2022-11-30 ENCOUNTER — HOSPITAL ENCOUNTER (OUTPATIENT)
Dept: LAB | Facility: MEDICAL CENTER | Age: 57
End: 2022-11-30
Attending: PHYSICIAN ASSISTANT
Payer: COMMERCIAL

## 2022-11-30 DIAGNOSIS — I10 PRIMARY HYPERTENSION: ICD-10-CM

## 2022-11-30 DIAGNOSIS — R53.83 OTHER FATIGUE: ICD-10-CM

## 2022-11-30 DIAGNOSIS — E11.9 CONTROLLED TYPE 2 DIABETES MELLITUS WITHOUT COMPLICATION, WITHOUT LONG-TERM CURRENT USE OF INSULIN (HCC): ICD-10-CM

## 2022-11-30 DIAGNOSIS — R10.12 LUQ PAIN: ICD-10-CM

## 2022-11-30 DIAGNOSIS — E78.5 DYSLIPIDEMIA: ICD-10-CM

## 2022-11-30 LAB
ALBUMIN SERPL BCP-MCNC: 4.3 G/DL (ref 3.2–4.9)
ALBUMIN/GLOB SERPL: 1.4 G/DL
ALP SERPL-CCNC: 80 U/L (ref 30–99)
ALT SERPL-CCNC: 35 U/L (ref 2–50)
ANION GAP SERPL CALC-SCNC: 13 MMOL/L (ref 7–16)
AST SERPL-CCNC: 36 U/L (ref 12–45)
BASOPHILS # BLD AUTO: 0.9 % (ref 0–1.8)
BASOPHILS # BLD: 0.06 K/UL (ref 0–0.12)
BILIRUB SERPL-MCNC: 0.6 MG/DL (ref 0.1–1.5)
BUN SERPL-MCNC: 10 MG/DL (ref 8–22)
CALCIUM SERPL-MCNC: 9.5 MG/DL (ref 8.5–10.5)
CHLORIDE SERPL-SCNC: 102 MMOL/L (ref 96–112)
CHOLEST SERPL-MCNC: 158 MG/DL (ref 100–199)
CO2 SERPL-SCNC: 23 MMOL/L (ref 20–33)
CREAT SERPL-MCNC: 0.55 MG/DL (ref 0.5–1.4)
CREAT UR-MCNC: 142.12 MG/DL
EOSINOPHIL # BLD AUTO: 0.15 K/UL (ref 0–0.51)
EOSINOPHIL NFR BLD: 2.2 % (ref 0–6.9)
ERYTHROCYTE [DISTWIDTH] IN BLOOD BY AUTOMATED COUNT: 42.8 FL (ref 35.9–50)
EST. AVERAGE GLUCOSE BLD GHB EST-MCNC: 128 MG/DL
GFR SERPLBLD CREATININE-BSD FMLA CKD-EPI: 117 ML/MIN/1.73 M 2
GLOBULIN SER CALC-MCNC: 3 G/DL (ref 1.9–3.5)
GLUCOSE SERPL-MCNC: 118 MG/DL (ref 65–99)
HBA1C MFR BLD: 6.1 % (ref 4–5.6)
HCT VFR BLD AUTO: 49.2 % (ref 42–52)
HDLC SERPL-MCNC: 36 MG/DL
HGB BLD-MCNC: 16.9 G/DL (ref 14–18)
IMM GRANULOCYTES # BLD AUTO: 0.03 K/UL (ref 0–0.11)
IMM GRANULOCYTES NFR BLD AUTO: 0.4 % (ref 0–0.9)
LDLC SERPL CALC-MCNC: 76 MG/DL
LYMPHOCYTES # BLD AUTO: 1.73 K/UL (ref 1–4.8)
LYMPHOCYTES NFR BLD: 25.3 % (ref 22–41)
MCH RBC QN AUTO: 30.8 PG (ref 27–33)
MCHC RBC AUTO-ENTMCNC: 34.3 G/DL (ref 33.7–35.3)
MCV RBC AUTO: 89.8 FL (ref 81.4–97.8)
MICROALBUMIN UR-MCNC: 3.7 MG/DL
MICROALBUMIN/CREAT UR: 26 MG/G (ref 0–30)
MONOCYTES # BLD AUTO: 0.71 K/UL (ref 0–0.85)
MONOCYTES NFR BLD AUTO: 10.4 % (ref 0–13.4)
NEUTROPHILS # BLD AUTO: 4.15 K/UL (ref 1.82–7.42)
NEUTROPHILS NFR BLD: 60.8 % (ref 44–72)
NRBC # BLD AUTO: 0 K/UL
NRBC BLD-RTO: 0 /100 WBC
PLATELET # BLD AUTO: 214 K/UL (ref 164–446)
PMV BLD AUTO: 11.6 FL (ref 9–12.9)
POTASSIUM SERPL-SCNC: 4.3 MMOL/L (ref 3.6–5.5)
PROT SERPL-MCNC: 7.3 G/DL (ref 6–8.2)
RBC # BLD AUTO: 5.48 M/UL (ref 4.7–6.1)
SODIUM SERPL-SCNC: 138 MMOL/L (ref 135–145)
TRIGL SERPL-MCNC: 230 MG/DL (ref 0–149)
TSH SERPL DL<=0.005 MIU/L-ACNC: 1.64 UIU/ML (ref 0.38–5.33)
WBC # BLD AUTO: 6.8 K/UL (ref 4.8–10.8)

## 2022-11-30 PROCEDURE — 82043 UR ALBUMIN QUANTITATIVE: CPT

## 2022-11-30 PROCEDURE — 80053 COMPREHEN METABOLIC PANEL: CPT

## 2022-11-30 PROCEDURE — 76705 ECHO EXAM OF ABDOMEN: CPT

## 2022-11-30 PROCEDURE — 36415 COLL VENOUS BLD VENIPUNCTURE: CPT

## 2022-11-30 PROCEDURE — 80061 LIPID PANEL: CPT

## 2022-11-30 PROCEDURE — 85025 COMPLETE CBC W/AUTO DIFF WBC: CPT

## 2022-11-30 PROCEDURE — 82570 ASSAY OF URINE CREATININE: CPT

## 2022-11-30 PROCEDURE — 84443 ASSAY THYROID STIM HORMONE: CPT

## 2022-11-30 PROCEDURE — 83036 HEMOGLOBIN GLYCOSYLATED A1C: CPT

## 2022-12-01 DIAGNOSIS — E11.9 CONTROLLED TYPE 2 DIABETES MELLITUS WITHOUT COMPLICATION, WITHOUT LONG-TERM CURRENT USE OF INSULIN (HCC): ICD-10-CM

## 2023-02-17 DIAGNOSIS — N50.819 TESTICULAR DISCOMFORT: ICD-10-CM

## 2023-02-17 DIAGNOSIS — L03.116 CELLULITIS OF LEFT LOWER EXTREMITY: ICD-10-CM

## 2023-02-17 RX ORDER — DOXYCYCLINE HYCLATE 100 MG
100 TABLET ORAL 2 TIMES DAILY
Qty: 20 TABLET | Refills: 1 | Status: SHIPPED | OUTPATIENT
Start: 2023-02-17 | End: 2023-06-05

## 2023-02-17 NOTE — PROGRESS NOTES
"2/14/23 evening: felt \"fuzzy headed\" and pan in the LLE, area of repeat cellulitis; history of hospitalizations for the same.  Started doxycycline and started feeling better within 24 hours.    Having retracting testes x 2 weeks. Only when sitting. Discomfort. No overt pain. No masses, lumps, rashes. No concerns for STDs. Will US.  "

## 2023-02-21 ENCOUNTER — HOSPITAL ENCOUNTER (OUTPATIENT)
Dept: RADIOLOGY | Facility: MEDICAL CENTER | Age: 58
End: 2023-02-21
Attending: PHYSICIAN ASSISTANT
Payer: COMMERCIAL

## 2023-02-21 DIAGNOSIS — N50.819 TESTICULAR DISCOMFORT: ICD-10-CM

## 2023-02-21 PROCEDURE — 76870 US EXAM SCROTUM: CPT

## 2023-02-22 DIAGNOSIS — Q55.22 RETRACTILE TESTIS: ICD-10-CM

## 2023-03-10 ENCOUNTER — APPOINTMENT (OUTPATIENT)
Dept: RADIOLOGY | Facility: MEDICAL CENTER | Age: 58
End: 2023-03-10
Attending: PHYSICIAN ASSISTANT
Payer: COMMERCIAL

## 2023-03-16 ENCOUNTER — HOSPITAL ENCOUNTER (OUTPATIENT)
Dept: RADIOLOGY | Facility: MEDICAL CENTER | Age: 58
End: 2023-03-16
Attending: PHYSICIAN ASSISTANT
Payer: COMMERCIAL

## 2023-03-16 DIAGNOSIS — Q55.22 RETRACTILE TESTIS: ICD-10-CM

## 2023-03-16 PROCEDURE — 74176 CT ABD & PELVIS W/O CONTRAST: CPT

## 2023-04-10 RX ORDER — ATORVASTATIN CALCIUM 20 MG/1
20 TABLET, FILM COATED ORAL DAILY
Qty: 90 TABLET | Refills: 3 | Status: SHIPPED | OUTPATIENT
Start: 2023-04-10

## 2023-04-11 RX ORDER — ZOLPIDEM TARTRATE 10 MG/1
TABLET ORAL
COMMUNITY
Start: 2023-01-14 | End: 2023-08-25 | Stop reason: SDUPTHER

## 2023-04-12 ENCOUNTER — OFFICE VISIT (OUTPATIENT)
Dept: MEDICAL GROUP | Facility: PHYSICIAN GROUP | Age: 58
End: 2023-04-12
Payer: COMMERCIAL

## 2023-04-12 ENCOUNTER — HOSPITAL ENCOUNTER (OUTPATIENT)
Dept: RADIOLOGY | Facility: MEDICAL CENTER | Age: 58
End: 2023-04-12
Attending: PHYSICIAN ASSISTANT
Payer: COMMERCIAL

## 2023-04-12 VITALS
RESPIRATION RATE: 16 BRPM | WEIGHT: 315 LBS | DIASTOLIC BLOOD PRESSURE: 68 MMHG | OXYGEN SATURATION: 94 % | SYSTOLIC BLOOD PRESSURE: 164 MMHG | TEMPERATURE: 98.8 F | HEART RATE: 86 BPM | HEIGHT: 68 IN | BODY MASS INDEX: 47.74 KG/M2

## 2023-04-12 DIAGNOSIS — Z23 NEED FOR VACCINATION: ICD-10-CM

## 2023-04-12 DIAGNOSIS — E11.9 CONTROLLED TYPE 2 DIABETES MELLITUS WITHOUT COMPLICATION, WITHOUT LONG-TERM CURRENT USE OF INSULIN (HCC): ICD-10-CM

## 2023-04-12 DIAGNOSIS — M79.89 LEFT LEG SWELLING: ICD-10-CM

## 2023-04-12 DIAGNOSIS — I10 PRIMARY HYPERTENSION: ICD-10-CM

## 2023-04-12 DIAGNOSIS — J45.20 MILD INTERMITTENT ASTHMA WITHOUT COMPLICATION: ICD-10-CM

## 2023-04-12 DIAGNOSIS — E78.5 DYSLIPIDEMIA: ICD-10-CM

## 2023-04-12 DIAGNOSIS — M79.662 PAIN OF LEFT CALF: ICD-10-CM

## 2023-04-12 PROCEDURE — 93971 EXTREMITY STUDY: CPT | Mod: LT

## 2023-04-12 PROCEDURE — 90471 IMMUNIZATION ADMIN: CPT | Performed by: PHYSICIAN ASSISTANT

## 2023-04-12 PROCEDURE — 90677 PCV20 VACCINE IM: CPT | Performed by: PHYSICIAN ASSISTANT

## 2023-04-12 PROCEDURE — 99214 OFFICE O/P EST MOD 30 MIN: CPT | Mod: 25 | Performed by: PHYSICIAN ASSISTANT

## 2023-04-12 RX ORDER — PREDNISONE 20 MG/1
60 TABLET ORAL DAILY
Qty: 15 TABLET | Refills: 0 | Status: SHIPPED | OUTPATIENT
Start: 2023-04-12 | End: 2023-04-17

## 2023-04-12 ASSESSMENT — PATIENT HEALTH QUESTIONNAIRE - PHQ9: CLINICAL INTERPRETATION OF PHQ2 SCORE: 0

## 2023-04-12 ASSESSMENT — ENCOUNTER SYMPTOMS
FEVER: 0
SHORTNESS OF BREATH: 0
CHILLS: 0

## 2023-04-12 ASSESSMENT — FIBROSIS 4 INDEX: FIB4 SCORE: 1.56

## 2023-04-12 NOTE — PROGRESS NOTES
Subjective:     CC: Left leg pain, chronic issues    HPI:   French presents today with    Problem   Left Leg Swelling    Chronic, uncontrolled.  Symptoms started 2 to 3 weeks ago.  Patient has issues with chronically intermittent cellulitis of left lower extremity that frequently presents in the same manner.  He started doxycycline about 2 and half weeks ago and it seemed to improve his symptoms until he stopped the medication.  Now having significant pain in the left calf, feels like it is swollen.     Dyslipidemia    Chronic, uncontrolled.  May need to consider adding fenofibrate.  Latest Labs:   Lab Results   Component Value Date/Time    CHOLSTRLTOT 158 11/30/2022 10:01 AM    LDL 76 11/30/2022 10:01 AM    HDL 36 (A) 11/30/2022 10:01 AM    TRIGLYCERIDE 230 (H) 11/30/2022 10:01 AM      Medications: Atorvastatin 20 chronic, stable.  Mg, fenofibrate 145 mg  Medication side effects: None  Diet/Exercise: No exercise currently due to a recently fractured hand; tries to make good food choices.  Risk calculator: The 10-year ASCVD risk score (Jojo ROJAS, et al., 2019) is: 18.7%        Controlled Type 2 Diabetes Mellitus Without Complication, Without Long-Term Current Use of Insulin (Hcc)    Chronic, stable.  Current medications:  Insulin:   Biguanide: Metformin XR 1000 mg daily  GLP1-RA: Trulicity caused loose stool; may still try Ozempic, given the help with weight loss.    SGLT-2i:      DPP4-I:   TZD:   Maria Victoria:   Sulfonyluria:     Last A1c: 6.1% (11/30/2022); 6.3% (2/2022)  Last Microalb/Cr ratio: 3.7 (11/2022)  Last diabetic foot exam: 4/12/2023  Last retinal eye exam:  ACEi/ARB?  Losartan 100 mg.  Statin?  Atorvastatin 80 mg  Aspirin?  Advised 81 mg daily  Concomitant HTN?  Yes  Nightly foot checks?  No       Mild Intermittent Asthma Without Complication    Chronic, stable.  Tolerating Advair 100/50 when he remembers to take it.  Has been having some breathing issues over the last few weeks.  Encouraged him to take Advair  "daily as this will likely improve his breathing.  Followed by pulmonology.  Has an appointment tomorrow.     Hypertension    Chronic, stable.  Tolerating amlodipine 5 mg and losartan 100 mg.  High in clinic today but patient is in a lot of pain.  Patient will monitor at home.           Health Maintenance: Due for zoster and retinal eye screening    ROS:  Review of Systems   Constitutional:  Negative for chills and fever.   Respiratory:  Negative for shortness of breath.    Cardiovascular:  Negative for chest pain.   Musculoskeletal:  Positive for joint pain.     Objective:     Exam:  BP (!) 164/68 (BP Location: Right arm, Patient Position: Sitting, BP Cuff Size: Large adult)   Pulse 86   Temp 37.1 °C (98.8 °F) (Temporal)   Resp 16   Ht 1.727 m (5' 8\")   Wt (!) 148 kg (326 lb 3 oz)   SpO2 94%   BMI 49.60 kg/m²  Body mass index is 49.6 kg/m².    Physical Exam  Constitutional:       Appearance: Normal appearance.   HENT:      Head: Normocephalic.   Eyes:      Conjunctiva/sclera: Conjunctivae normal.   Cardiovascular:      Rate and Rhythm: Normal rate and regular rhythm.      Pulses: Normal pulses.      Heart sounds: No murmur heard.    No gallop.   Pulmonary:      Effort: No respiratory distress.      Breath sounds: No wheezing.   Musculoskeletal:         General: No swelling.      Comments: Diffuse tenderness noted of the left calf.  No rash, erythema, or other signs of infection noted.  Strong pedal pulses bilaterally.  Does not appear to be significantly larger than the right but patient states it feels swollen.  Left calf pain with any flexion of the left knee past 90 degrees.   Skin:     General: Skin is warm and dry.   Neurological:      General: No focal deficit present.      Mental Status: He is alert.   Psychiatric:         Mood and Affect: Mood normal.         Behavior: Behavior normal.       Monofilament testing with a 10 gram force: sensation intact: intact bilaterally  Visual Inspection: Feet " without maceration, ulcers, fissures.  Pedal pulses: intact bilaterally      Assessment & Plan:     57 y.o. male with the following -     1. Left leg swelling/Left calf pain  Chronic, uncontrolled.  Stat ultrasound ordered today.  Will contact patient with results.  Feel patient's leg pain may affect his work performance and cause a safety issue due to increased risk of falls.  Patient may return to work 4/17/2023.  Patient may need [facility] paperwork filled out.    - US-EXTREMITY VENOUS LOWER UNILAT LEFT; Future    2. Controlled type 2 diabetes mellitus without complication, without long-term current use of insulin (HCC)  Chronic, stable.  Due to repeat A1c.  Due for retinal screen.    - Diabetic Monofilament LE Exam  - HEMOGLOBIN A1C; Future    3. Primary hypertension  Chronic, uncontrolled today.  Blood pressure is typically controlled the patient is in a lot of pain today.  Patient will monitor at home and let me know if it still running high.    4. Mild intermittent asthma without complication  Chronic, stable.  Encouraged him to use his Advair inhaler daily.  Has follow-up with pulmonology tomorrow.    5. Need for vaccination    - Pneumococcal Conjugate Vaccine 20-Valent (19 yrs+)    6. Dyslipidemia  Chronic, uncontrolled.  Continue Lipitor 20 mg daily.  May need to consider adding fenofibrate.    - Lipid Profile; Future    Patient is under tremendous amount of stress.  His wife has chronic medical issues and also helps take care of his mother and mother-in-law.  He works most days of the week  Dedicated exercise difficult as well as making good food choices.    Return if symptoms worsen or fail to improve.    Please note that this dictation was created using voice recognition software. I have made every reasonable attempt to correct obvious errors, but I expect that there are errors of grammar and possibly content that I did not discover before finalizing the note.      1300 hrs.:  Ultrasound is negative for  DVT.  We will try steroids.  May need to consider physical therapy referral.

## 2023-04-12 NOTE — LETTER
[address] [id number]     April 12, 2023    Patient: French Hurd   YOB: 1965   Date of Visit: 4/12/2023       To Whom It May Concern:    French Hurd was seen and treated in our department on 4/12/2023. Please excuse him for missing work 4/12/2023 through 4/16/2023, returning to work without restrictions 4/17/2023.    Sincerely,     Cordelia Bond P.A.-C.

## 2023-06-02 DIAGNOSIS — L03.116 CELLULITIS OF LEFT LOWER EXTREMITY: ICD-10-CM

## 2023-06-05 RX ORDER — DOXYCYCLINE HYCLATE 100 MG
TABLET ORAL
Qty: 20 TABLET | Refills: 2 | Status: SHIPPED | OUTPATIENT
Start: 2023-06-05 | End: 2024-01-18

## 2023-06-14 ENCOUNTER — OFFICE VISIT (OUTPATIENT)
Dept: MEDICAL GROUP | Facility: PHYSICIAN GROUP | Age: 58
End: 2023-06-14
Payer: COMMERCIAL

## 2023-06-14 ENCOUNTER — HOSPITAL ENCOUNTER (OUTPATIENT)
Dept: LAB | Facility: MEDICAL CENTER | Age: 58
End: 2023-06-14
Attending: PHYSICIAN ASSISTANT
Payer: COMMERCIAL

## 2023-06-14 VITALS
TEMPERATURE: 97.2 F | RESPIRATION RATE: 16 BRPM | OXYGEN SATURATION: 97 % | WEIGHT: 315 LBS | HEIGHT: 68 IN | DIASTOLIC BLOOD PRESSURE: 70 MMHG | BODY MASS INDEX: 47.74 KG/M2 | HEART RATE: 76 BPM | SYSTOLIC BLOOD PRESSURE: 142 MMHG

## 2023-06-14 DIAGNOSIS — E78.5 DYSLIPIDEMIA: ICD-10-CM

## 2023-06-14 DIAGNOSIS — J45.20 MILD INTERMITTENT ASTHMA WITHOUT COMPLICATION: ICD-10-CM

## 2023-06-14 DIAGNOSIS — M79.89 LEFT LEG SWELLING: ICD-10-CM

## 2023-06-14 DIAGNOSIS — R07.89 OTHER CHEST PAIN: ICD-10-CM

## 2023-06-14 DIAGNOSIS — F51.01 PRIMARY INSOMNIA: ICD-10-CM

## 2023-06-14 DIAGNOSIS — R79.89 ELEVATED D-DIMER: ICD-10-CM

## 2023-06-14 DIAGNOSIS — E11.9 CONTROLLED TYPE 2 DIABETES MELLITUS WITHOUT COMPLICATION, WITHOUT LONG-TERM CURRENT USE OF INSULIN (HCC): ICD-10-CM

## 2023-06-14 DIAGNOSIS — M47.812 OSTEOARTHRITIS OF CERVICAL SPINE, UNSPECIFIED SPINAL OSTEOARTHRITIS COMPLICATION STATUS: ICD-10-CM

## 2023-06-14 LAB
D DIMER PPP IA.FEU-MCNC: 0.55 UG/ML (FEU) (ref 0–0.5)
HBA1C MFR BLD: 5.8 % (ref ?–5.8)
POCT INT CON NEG: NEGATIVE
POCT INT CON POS: POSITIVE
TROPONIN T SERPL-MCNC: 11 NG/L (ref 6–19)

## 2023-06-14 PROCEDURE — 80053 COMPREHEN METABOLIC PANEL: CPT

## 2023-06-14 PROCEDURE — 99214 OFFICE O/P EST MOD 30 MIN: CPT | Performed by: PHYSICIAN ASSISTANT

## 2023-06-14 PROCEDURE — 85379 FIBRIN DEGRADATION QUANT: CPT

## 2023-06-14 PROCEDURE — 3078F DIAST BP <80 MM HG: CPT | Performed by: PHYSICIAN ASSISTANT

## 2023-06-14 PROCEDURE — 36415 COLL VENOUS BLD VENIPUNCTURE: CPT

## 2023-06-14 PROCEDURE — 83036 HEMOGLOBIN GLYCOSYLATED A1C: CPT | Performed by: PHYSICIAN ASSISTANT

## 2023-06-14 PROCEDURE — 84484 ASSAY OF TROPONIN QUANT: CPT

## 2023-06-14 PROCEDURE — 3077F SYST BP >= 140 MM HG: CPT | Performed by: PHYSICIAN ASSISTANT

## 2023-06-14 RX ORDER — PREDNISONE 20 MG/1
TABLET ORAL
Qty: 30 TABLET | Refills: 0 | Status: SHIPPED | OUTPATIENT
Start: 2023-06-14 | End: 2024-01-18

## 2023-06-14 RX ORDER — TIZANIDINE 4 MG/1
4 TABLET ORAL
Qty: 90 TABLET | Refills: 3 | Status: SHIPPED | OUTPATIENT
Start: 2023-06-14

## 2023-06-14 ASSESSMENT — ENCOUNTER SYMPTOMS
CHILLS: 0
FEVER: 0
SHORTNESS OF BREATH: 0

## 2023-06-14 ASSESSMENT — FIBROSIS 4 INDEX: FIB4 SCORE: 1.56

## 2023-06-14 NOTE — PROGRESS NOTES
Subjective:     CC: Chronic issues    HPI:   French presents today with    Problem   Other Chest Pain    Chronic, uncontrolled.  Started 4 months ago.  At rest when he noticed it.  NO PAIN WITH EXERTION.  Feels better when he stretches.  Denies orthopnea, PND.  Intermittent SOB - history of known asthma.       Left Leg Swelling    Chronic, intermittent.  Mostly controlled.  Too hot to wear compression stockings.  Declines diuretics.  Denies orthopnea and PND.  Does have intermittently chronic shortness of breath but has significant asthma.  Patient has issues with chronically intermittent cellulitis of left lower extremity that frequently presents in the same manner.  Patient has doxycycline on hand in case this happens, as it happens frequently.       Controlled Type 2 Diabetes Mellitus Without Complication, Without Long-Term Current Use of Insulin (Hcc)    Chronic, stable.  Current medications:  Biguanide: Metformin XR 1000 mg twice daily  GLP1-RA: Trulicity caused loose stool; may still try Ozempic, given the help with weight loss.      Last A1c: 5.8% (6/2023) ; 6.1% (11/30/2022); 6.3% (2/2022)  Last Microalb/Cr ratio: 3.7 (11/2022)  Last diabetic foot exam: 4/12/2023  Last retinal eye exam:  ACEi/ARB?  Losartan 100 mg.  Statin?  Atorvastatin 80 mg  Aspirin?  Advised 81 mg daily  Concomitant HTN?  Yes  Nightly foot checks?  No       Insomnia    Chronic, stable.  Tolerating Ambien 10 mg.  Helps him fall asleep.  Has frequent awakenings in the night.  Does not feel groggy in the morning.     Cervical Spine Degeneration    Chronic, intermittent.  Uses tizanidine as needed for pain.  Needs a refill.     Knee Pain (Resolved)       Health Maintenance: Completed    ROS:  Review of Systems   Constitutional:  Negative for chills and fever.   Respiratory:  Negative for shortness of breath.    Cardiovascular:  Negative for chest pain.       Objective:     Exam:  BP (!) 142/70 (BP Location: Left arm, Patient Position:  "Sitting, BP Cuff Size: Adult long)   Pulse 76   Temp 36.2 °C (97.2 °F) (Temporal)   Resp 16   Ht 1.727 m (5' 8\")   Wt (!) 148 kg (326 lb)   SpO2 97%   BMI 49.57 kg/m²  Body mass index is 49.57 kg/m².    Physical Exam  Vitals reviewed.   Constitutional:       General: He is not in acute distress.     Appearance: Normal appearance.   Cardiovascular:      Rate and Rhythm: Normal rate and regular rhythm.      Heart sounds:      No friction rub.   Pulmonary:      Effort: Pulmonary effort is normal.      Breath sounds: No wheezing.   Musculoskeletal:      Comments: No chest wall pain on palpation.   Neurological:      General: No focal deficit present.      Mental Status: He is alert.   Psychiatric:         Mood and Affect: Mood normal.         Behavior: Behavior normal.         Judgment: Judgment normal.         Assessment & Plan:     57 y.o. male with the following -     1. Controlled type 2 diabetes mellitus without complication, without long-term current use of insulin (HCC)  Chronic, controlled.  A1c 5.8% in clinic today.  Continue metformin 1000 mg twice daily.    - POCT Hemoglobin A1C    2. Other chest pain  Chronic, intermittent.  Checking D-dimer and troponin.  Not worse with exertion.    - D-DIMER; Future  - TROPONIN; Future    3. Osteoarthritis of cervical spine, unspecified spinal osteoarthritis complication status  Chronic, intermittent.  Tizanidine helps with pain.  Refilling medication today.  Use all medication as directed.    - tizanidine (ZANAFLEX) 4 MG Tab; Take 1 Tablet by mouth at bedtime as needed (pain).  Dispense: 90 Tablet; Refill: 3    4. Mild intermittent asthma without complication  Chronic, intermittent.  Will have prednisone on hand to use as needed.  Have been working with patient for years and patient understands how to appropriately use this medication..  Patient is also followed by pulmonology.  - predniSONE (DELTASONE) 20 MG Tab; Take 60mg daily for 5 days as needed for flares.  " Dispense: 30 Tablet; Refill: 0    5. Dyslipidemia  Chronic, controlled.  The only thing that remains elevated his triglycerides.  This is likely due to alcohol use.  Patient is on fenofibrate 145 mg and atorvastatin 20 mg.  May need to consider increasing dose of atorvastatin.  Repeat labs in December.    6. Left leg swelling  Chronic, intermittent.  Currently asymptomatic.    7. Primary insomnia  Chronic, stable.  Continue Ambien 10 mg daily as needed.        Return in about 6 months (around 12/14/2023), or if symptoms worsen or fail to improve.    Please note that this dictation was created using voice recognition software. I have made every reasonable attempt to correct obvious errors, but I expect that there are errors of grammar and possibly content that I did not discover before finalizing the note.    1508 hrs.:  Spoke with patient on the telephone regarding elevated D-dimer.  Ordering CT PE, stat.  We will schedule for tomorrow morning.

## 2023-06-15 ENCOUNTER — HOSPITAL ENCOUNTER (OUTPATIENT)
Dept: RADIOLOGY | Facility: MEDICAL CENTER | Age: 58
End: 2023-06-15
Attending: PHYSICIAN ASSISTANT
Payer: COMMERCIAL

## 2023-06-15 DIAGNOSIS — I10 PRIMARY HYPERTENSION: ICD-10-CM

## 2023-06-15 LAB
ALBUMIN SERPL BCP-MCNC: 4.3 G/DL (ref 3.2–4.9)
ALBUMIN/GLOB SERPL: 1.5 G/DL
ALP SERPL-CCNC: 79 U/L (ref 30–99)
ALT SERPL-CCNC: 34 U/L (ref 2–50)
ANION GAP SERPL CALC-SCNC: 19 MMOL/L (ref 7–16)
AST SERPL-CCNC: 31 U/L (ref 12–45)
BILIRUB SERPL-MCNC: 0.4 MG/DL (ref 0.1–1.5)
BUN SERPL-MCNC: 13 MG/DL (ref 8–22)
CALCIUM ALBUM COR SERPL-MCNC: 8.7 MG/DL (ref 8.5–10.5)
CALCIUM SERPL-MCNC: 8.9 MG/DL (ref 8.5–10.5)
CHLORIDE SERPL-SCNC: 100 MMOL/L (ref 96–112)
CO2 SERPL-SCNC: 18 MMOL/L (ref 20–33)
CREAT SERPL-MCNC: 0.6 MG/DL (ref 0.5–1.4)
GFR SERPLBLD CREATININE-BSD FMLA CKD-EPI: 113 ML/MIN/1.73 M 2
GLOBULIN SER CALC-MCNC: 2.8 G/DL (ref 1.9–3.5)
GLUCOSE SERPL-MCNC: 129 MG/DL (ref 65–99)
POTASSIUM SERPL-SCNC: 3.9 MMOL/L (ref 3.6–5.5)
PROT SERPL-MCNC: 7.1 G/DL (ref 6–8.2)
SODIUM SERPL-SCNC: 137 MMOL/L (ref 135–145)

## 2023-06-15 PROCEDURE — 700117 HCHG RX CONTRAST REV CODE 255: Performed by: PHYSICIAN ASSISTANT

## 2023-06-15 PROCEDURE — 71275 CT ANGIOGRAPHY CHEST: CPT

## 2023-06-15 RX ADMIN — IOHEXOL 75 ML: 350 INJECTION, SOLUTION INTRAVENOUS at 13:10

## 2023-06-16 DIAGNOSIS — R07.89 OTHER CHEST PAIN: ICD-10-CM

## 2023-06-16 RX ORDER — DICLOFENAC SODIUM 75 MG/1
75 TABLET, DELAYED RELEASE ORAL 2 TIMES DAILY PRN
Qty: 60 TABLET | Refills: 1 | Status: SHIPPED | OUTPATIENT
Start: 2023-06-16 | End: 2023-08-22 | Stop reason: SDUPTHER

## 2023-06-16 RX ORDER — METAXALONE 800 MG/1
800 TABLET ORAL 3 TIMES DAILY PRN
Qty: 90 TABLET | Refills: 1 | Status: SHIPPED | OUTPATIENT
Start: 2023-06-16 | End: 2023-08-22

## 2023-08-22 ENCOUNTER — OFFICE VISIT (OUTPATIENT)
Dept: MEDICAL GROUP | Facility: PHYSICIAN GROUP | Age: 58
End: 2023-08-22
Payer: COMMERCIAL

## 2023-08-22 VITALS
TEMPERATURE: 98.7 F | RESPIRATION RATE: 16 BRPM | HEART RATE: 83 BPM | OXYGEN SATURATION: 93 % | HEIGHT: 68 IN | BODY MASS INDEX: 47.74 KG/M2 | DIASTOLIC BLOOD PRESSURE: 68 MMHG | SYSTOLIC BLOOD PRESSURE: 152 MMHG | WEIGHT: 315 LBS

## 2023-08-22 DIAGNOSIS — Z23 NEED FOR VACCINATION: ICD-10-CM

## 2023-08-22 DIAGNOSIS — R22.2 MASS OF LEFT CHEST WALL: ICD-10-CM

## 2023-08-22 DIAGNOSIS — R21 RASH: ICD-10-CM

## 2023-08-22 DIAGNOSIS — M19.90 OSTEOARTHRITIS, UNSPECIFIED OSTEOARTHRITIS TYPE, UNSPECIFIED SITE: ICD-10-CM

## 2023-08-22 PROCEDURE — 90471 IMMUNIZATION ADMIN: CPT | Performed by: PHYSICIAN ASSISTANT

## 2023-08-22 PROCEDURE — 90715 TDAP VACCINE 7 YRS/> IM: CPT | Performed by: PHYSICIAN ASSISTANT

## 2023-08-22 PROCEDURE — 3077F SYST BP >= 140 MM HG: CPT | Performed by: PHYSICIAN ASSISTANT

## 2023-08-22 PROCEDURE — 99214 OFFICE O/P EST MOD 30 MIN: CPT | Mod: 25 | Performed by: PHYSICIAN ASSISTANT

## 2023-08-22 PROCEDURE — 3078F DIAST BP <80 MM HG: CPT | Performed by: PHYSICIAN ASSISTANT

## 2023-08-22 RX ORDER — CLOTRIMAZOLE AND BETAMETHASONE DIPROPIONATE 10; .64 MG/G; MG/G
CREAM TOPICAL
Qty: 45 G | Refills: 1 | Status: SHIPPED | OUTPATIENT
Start: 2023-08-22

## 2023-08-22 RX ORDER — AZITHROMYCIN 250 MG/1
TABLET, FILM COATED ORAL
COMMUNITY

## 2023-08-22 RX ORDER — DICLOFENAC SODIUM 75 MG/1
75 TABLET, DELAYED RELEASE ORAL 2 TIMES DAILY PRN
Qty: 180 TABLET | Refills: 1 | Status: SHIPPED | OUTPATIENT
Start: 2023-08-22

## 2023-08-22 RX ORDER — DOXYCYCLINE HYCLATE 100 MG
1 TABLET ORAL 2 TIMES DAILY
COMMUNITY
End: 2023-08-22

## 2023-08-22 ASSESSMENT — ENCOUNTER SYMPTOMS
FEVER: 0
SHORTNESS OF BREATH: 0
CHILLS: 0

## 2023-08-22 ASSESSMENT — FIBROSIS 4 INDEX: FIB4 SCORE: 1.37

## 2023-08-22 NOTE — PROGRESS NOTES
"Subjective:     CC:     HPI:   French presents today with    No problems updated.      ROS:  Review of Systems   Constitutional:  Negative for chills and fever.   Respiratory:  Negative for shortness of breath.    Cardiovascular:  Negative for chest pain.       Objective:     Exam:  BP (!) 152/68 (BP Location: Right arm, Patient Position: Sitting, BP Cuff Size: Large adult)   Pulse 83   Temp 37.1 °C (98.7 °F) (Temporal)   Resp 16   Ht 1.727 m (5' 8\")   Wt (!) 147 kg (324 lb 6 oz)   SpO2 93%   BMI 49.32 kg/m²  Body mass index is 49.32 kg/m².    Physical Exam  Vitals reviewed.   Constitutional:       General: He is not in acute distress.     Appearance: Normal appearance.   Pulmonary:      Effort: Pulmonary effort is normal.   Skin:     Comments: Scattered 1-2 mm erythematous lesions over the right hip/lateral right upper leg.  Raised.  Nontender to palpation.  None weeping.  Tinea noted in the right hip flexor region.   Neurological:      General: No focal deficit present.      Mental Status: He is alert.   Psychiatric:         Mood and Affect: Mood normal.         Behavior: Behavior normal.         Judgment: Judgment normal.             Assessment & Plan:     57 y.o. male with the following -     1. Rash  Acute, uncontrolled.  Lotrisone ordered.  Use as directed.  Tinea versus other.  No signs of infection noted.    2. Mass of left chest wall  Chronic, uncontrolled.  Most suspicious for lipoma.  Will ultrasound for further evaluation.    - US-ABDOMEN LTD (SOFT TISSUE); Future    3. Osteoarthritis, unspecified osteoarthritis type, unspecified site  Chronic, uncontrolled.  Complains of diffuse joint pain.  Refilling diclofenac as the patient was only taking it occasionally.  Advised taking up to twice daily to see if this helps alleviate some of his joint pain.    - diclofenac DR (VOLTAREN) 75 MG Tablet Delayed Response; Take 1 Tablet by mouth 2 times a day as needed (pain).  Dispense: 180 Tablet; Refill: " 1    4. Need for vaccination    - Tdap Vaccine =>6YO IM        Healthcare Maintenance:    Completed:  Tdap ordered in clinic      Return in about 3 months (around 11/22/2023).    Please note that this dictation was created using voice recognition software. I have made every reasonable attempt to correct obvious errors, but I expect that there are errors of grammar and possibly content that I did not discover before finalizing the note.

## 2023-08-25 DIAGNOSIS — F51.01 PRIMARY INSOMNIA: ICD-10-CM

## 2023-08-25 RX ORDER — ZOLPIDEM TARTRATE 10 MG/1
10 TABLET ORAL NIGHTLY PRN
Qty: 90 TABLET | Refills: 0 | Status: SHIPPED | OUTPATIENT
Start: 2023-08-25 | End: 2023-11-27

## 2023-10-05 RX ORDER — DOXYCYCLINE HYCLATE 100 MG
100 TABLET ORAL 2 TIMES DAILY
Qty: 14 TABLET | Refills: 0 | Status: SHIPPED | OUTPATIENT
Start: 2023-10-05 | End: 2023-10-12

## 2023-10-05 RX ORDER — PREDNISONE 20 MG/1
60 TABLET ORAL DAILY
Qty: 15 TABLET | Refills: 0 | Status: SHIPPED | OUTPATIENT
Start: 2023-10-05 | End: 2023-10-10

## 2023-11-09 ENCOUNTER — OFFICE VISIT (OUTPATIENT)
Dept: URGENT CARE | Facility: PHYSICIAN GROUP | Age: 58
End: 2023-11-09
Payer: COMMERCIAL

## 2023-11-09 VITALS
OXYGEN SATURATION: 100 % | HEIGHT: 68 IN | HEART RATE: 102 BPM | TEMPERATURE: 98.2 F | DIASTOLIC BLOOD PRESSURE: 68 MMHG | BODY MASS INDEX: 47.74 KG/M2 | SYSTOLIC BLOOD PRESSURE: 162 MMHG | WEIGHT: 315 LBS | RESPIRATION RATE: 18 BRPM

## 2023-11-09 DIAGNOSIS — J45.20 MILD INTERMITTENT ASTHMA WITHOUT COMPLICATION: ICD-10-CM

## 2023-11-09 DIAGNOSIS — U07.1 COVID-19: ICD-10-CM

## 2023-11-09 DIAGNOSIS — E11.9 CONTROLLED TYPE 2 DIABETES MELLITUS WITHOUT COMPLICATION, WITHOUT LONG-TERM CURRENT USE OF INSULIN (HCC): ICD-10-CM

## 2023-11-09 LAB
FLUAV RNA SPEC QL NAA+PROBE: NEGATIVE
FLUBV RNA SPEC QL NAA+PROBE: NEGATIVE
RSV RNA SPEC QL NAA+PROBE: NEGATIVE
SARS-COV-2 RNA RESP QL NAA+PROBE: POSITIVE

## 2023-11-09 PROCEDURE — 99214 OFFICE O/P EST MOD 30 MIN: CPT | Performed by: FAMILY MEDICINE

## 2023-11-09 PROCEDURE — 0241U POCT CEPHEID COV-2, FLU A/B, RSV - PCR: CPT | Performed by: FAMILY MEDICINE

## 2023-11-09 PROCEDURE — 3077F SYST BP >= 140 MM HG: CPT | Performed by: FAMILY MEDICINE

## 2023-11-09 PROCEDURE — 3078F DIAST BP <80 MM HG: CPT | Performed by: FAMILY MEDICINE

## 2023-11-09 ASSESSMENT — ENCOUNTER SYMPTOMS: FEVER: 0

## 2023-11-09 ASSESSMENT — FIBROSIS 4 INDEX: FIB4 SCORE: 1.39

## 2023-11-09 NOTE — PROGRESS NOTES
Subjective:     French Hurd is a 58 y.o. male who presents for Congestion and Sinus Problem (Right side face pressure. X 1 day)    HPI  Pt presents for evaluation of an acute problem  Patient with nasal congestion and sinus pain for the past day  Having myalgias, fatigue, and having headaches   Has had a productive cough   No vomiting or diarrhea   Sore throat is mild   Feeling chills    Review of Systems   Constitutional:  Negative for fever.   Skin:  Negative for rash.     PMH:  has a past medical history of Allergy, ASTHMA, Back pain, BPH (benign prostatic hypertrophy), Breast mass, Cervical spine degeneration, Chest pain, Difficulty breathing, Earache, ED (erectile dysfunction), High cholesterol, Hypercholesteremia, Hypertension, Obesity, Osteoarthritis, Prediabetes, Right arm pain, Ringing in ears, Sleep apnea, Sweat, sweating, excessive, and Wears glasses.  MEDS:   Current Outpatient Medications:     molnupiravir 200 MG capsule, Take 4 Capsules by mouth 2 times a day for 5 days., Disp: 40 Capsule, Rfl: 0    zolpidem (AMBIEN) 10 MG Tab, Take 1 Tablet by mouth at bedtime as needed for Sleep for up to 90 days. 90 DAY SUPPLY, Disp: 90 Tablet, Rfl: 0    azithromycin (ZITHROMAX) 250 MG Tab, TAKE 2 TABLETS BY MOUTH FOR 1 DAY THEN TAKE 1 TABLET BY MOUTH EVERY DAY FOR 4 DAYS, Disp: , Rfl:     clotrimazole-betamethasone (LOTRISONE) 1-0.05 % Cream, Apply to affected area up to 3 times daily as needed for rash., Disp: 45 g, Rfl: 1    diclofenac DR (VOLTAREN) 75 MG Tablet Delayed Response, Take 1 Tablet by mouth 2 times a day as needed (pain)., Disp: 180 Tablet, Rfl: 1    tizanidine (ZANAFLEX) 4 MG Tab, Take 1 Tablet by mouth at bedtime as needed (pain)., Disp: 90 Tablet, Rfl: 3    predniSONE (DELTASONE) 20 MG Tab, Take 60mg daily for 5 days as needed for flares., Disp: 30 Tablet, Rfl: 0    doxycycline (VIBRAMYCIN) 100 MG Tab, TAKE 1 TABLET TWICE A DAY, Disp: 20 Tablet, Rfl: 2    atorvastatin (LIPITOR) 20 MG Tab,  Take 1 Tablet by mouth every day., Disp: 90 Tablet, Rfl: 3    albuterol (PROVENTIL) 2.5mg/3ml Nebu Soln solution for nebulization, Take 3 mL by nebulization every four hours as needed for Shortness of Breath., Disp: 120 mL, Rfl: 11    albuterol (VENTOLIN HFA) 108 (90 Base) MCG/ACT Aero Soln inhalation aerosol, Inhale 2 Puffs every four hours as needed for Shortness of Breath., Disp: 3 Each, Rfl: 3    fluticasone-salmeterol (ADVAIR DISKUS) 100-50 MCG/ACT AEROSOL POWDER, BREATH ACTIVATED, Inhale 1 Puff 2 times a day. Rinse mouth after each use., Disp: 3 Each, Rfl: 3    esomeprazole (NEXIUM) 40 MG delayed-release capsule, Take 40 mg by mouth every day., Disp: , Rfl:     metFORMIN ER (GLUCOPHAGE XR) 500 MG TABLET SR 24 HR, Take 2 Tablets by mouth every day., Disp: 180 Tablet, Rfl: 3    triamcinolone acetonide (KENALOG) 0.5 % Cream, Apply to affected  area up to 4 times daily as needed for rash/itching. Max use is 14 days in a row., Disp: 454 g, Rfl: 1    cholestyramine (QUESTRAN) 4 g packet, Take 4 g by mouth every day., Disp: 30 Each, Rfl: 0    montelukast (SINGULAIR) 10 MG Tab, Take 1 Tablet by mouth every day., Disp: 90 Tablet, Rfl: 3    losartan (COZAAR) 100 MG Tab, Take 100 mg by mouth every day., Disp: , Rfl:     Levocetirizine Dihydrochloride 5 MG Tab, Take  by mouth., Disp: , Rfl:     hydrOXYzine HCl (ATARAX) 25 MG Tab, Take 1 Tab by mouth 3 times a day as needed for Itching., Disp: 20 Tab, Rfl: 0    amLODIPine (NORVASC) 5 MG Tab, Take 5 mg by mouth every day., Disp: , Rfl:     fexofenadine (ALLEGRA) 180 MG tablet, Take 180 mg by mouth 1 time daily as needed., Disp: , Rfl:     fenofibrate (TRICOR) 145 MG Tab, Take 145 mg by mouth every evening., Disp: , Rfl:   ALLERGIES: No Known Allergies  SURGHX:   Past Surgical History:   Procedure Laterality Date    COLONOSCOPY - ENDO  8/9/2019    Procedure: COLONOSCOPY - AVERAGE RISK SCREENING;  Surgeon: Gopal Dinh M.D.;  Location: SURGERY [facility]   "Service: Gastroenterology    HARDWARE REMOVAL ORTHO Right 2017    right hand    CERVICAL DISK AND FUSION ANTERIOR  2017    C5,6,7    APPENDECTOMY  2001    OTHER ORTHOPEDIC SURGERY Left 1993    L wrist, R hand-4th and 5th metacarpal    LIGAMENT RECONSTRUCTION Left 1991    ulna    ORIF, ANKLE Right 1987    HARDWARE REMOVAL ORTHO Right 1987    right ankle    TONSILLECTOMY  1983    TONSILLECTOMY      VASECTOMY      x2     SOCHX:  reports that he quit smoking about 41 years ago. His smoking use included cigarettes. He started smoking about 42 years ago. He has a 0.2 pack-year smoking history. He has never used smokeless tobacco. He reports current alcohol use of about 1.2 oz of alcohol per week. He reports that he does not use drugs.     Objective:   BP (!) 162/68   Pulse (!) 102   Temp 36.8 °C (98.2 °F) (Temporal)   Resp 18   Ht 1.727 m (5' 8\")   Wt (!) 147 kg (323 lb)   SpO2 100%   BMI 49.11 kg/m²     Physical Exam  Constitutional:       Appearance: He is well-developed.      Comments: Appears fatigued      HENT:      Head: Normocephalic and atraumatic.      Right Ear: Tympanic membrane, ear canal and external ear normal.      Left Ear: Tympanic membrane, ear canal and external ear normal.      Nose: Congestion present.      Mouth/Throat:      Mouth: Mucous membranes are moist.      Pharynx: Oropharynx is clear. No oropharyngeal exudate or posterior oropharyngeal erythema.   Neck:      Trachea: No tracheal deviation.   Cardiovascular:      Rate and Rhythm: Normal rate and regular rhythm.      Heart sounds: Normal heart sounds.   Pulmonary:      Effort: Pulmonary effort is normal. No respiratory distress.      Breath sounds: Normal breath sounds. No wheezing or rales.   Musculoskeletal:         General: Normal range of motion.      Cervical back: Normal range of motion and neck supple. No tenderness.   Lymphadenopathy:      Cervical: No cervical adenopathy.   Skin:     General: Skin is warm and dry.      " Findings: No rash.   Neurological:      Mental Status: He is alert.   Psychiatric:         Behavior: Behavior normal.         Thought Content: Thought content normal.         Judgment: Judgment normal.       Assessment/Plan:   Assessment    1. COVID-19  - POCT CoV-2, Flu A/B, RSV by PCR  - molnupiravir 200 MG capsule; Take 4 Capsules by mouth 2 times a day for 5 days.  Dispense: 40 Capsule; Refill: 0    2. Controlled type 2 diabetes mellitus without complication, without long-term current use of insulin ([facility])    3. Mild intermittent asthma without complication    4. BMI 45.0-49.9, adult (HCC)    Patient with COVID-19.  Patient is high risk for hospitalization and severe infection course.  Recommended treatment with antiviral medication.  Patient agreeable to this and molnupiravir sent to pharmacy.  All questions answered and will follow-up if not making great improvements.

## 2023-11-13 ENCOUNTER — APPOINTMENT (OUTPATIENT)
Dept: MEDICAL GROUP | Facility: PHYSICIAN GROUP | Age: 58
End: 2023-11-13
Payer: COMMERCIAL

## 2023-11-22 DIAGNOSIS — F51.01 PRIMARY INSOMNIA: ICD-10-CM

## 2023-11-27 RX ORDER — ZOLPIDEM TARTRATE 10 MG/1
TABLET ORAL
Qty: 90 TABLET | Refills: 0 | Status: SHIPPED | OUTPATIENT
Start: 2023-11-27 | End: 2024-01-18 | Stop reason: SDUPTHER

## 2023-12-11 ENCOUNTER — APPOINTMENT (OUTPATIENT)
Dept: MEDICAL GROUP | Facility: PHYSICIAN GROUP | Age: 58
End: 2023-12-11
Payer: COMMERCIAL

## 2024-01-18 ENCOUNTER — OFFICE VISIT (OUTPATIENT)
Dept: MEDICAL GROUP | Facility: PHYSICIAN GROUP | Age: 59
End: 2024-01-18
Payer: COMMERCIAL

## 2024-01-18 VITALS
HEART RATE: 62 BPM | HEIGHT: 68 IN | RESPIRATION RATE: 16 BRPM | DIASTOLIC BLOOD PRESSURE: 80 MMHG | WEIGHT: 315 LBS | TEMPERATURE: 97.5 F | SYSTOLIC BLOOD PRESSURE: 158 MMHG | BODY MASS INDEX: 47.74 KG/M2 | OXYGEN SATURATION: 95 %

## 2024-01-18 DIAGNOSIS — E55.9 VITAMIN D DEFICIENCY: ICD-10-CM

## 2024-01-18 DIAGNOSIS — M15.9 OSTEOARTHRITIS OF MULTIPLE JOINTS, UNSPECIFIED OSTEOARTHRITIS TYPE: ICD-10-CM

## 2024-01-18 DIAGNOSIS — I15.2 HYPERTENSION ASSOCIATED WITH TYPE 2 DIABETES MELLITUS (HCC): ICD-10-CM

## 2024-01-18 DIAGNOSIS — F51.01 PRIMARY INSOMNIA: ICD-10-CM

## 2024-01-18 DIAGNOSIS — E11.69 DYSLIPIDEMIA DUE TO TYPE 2 DIABETES MELLITUS (HCC): ICD-10-CM

## 2024-01-18 DIAGNOSIS — Z12.5 ENCOUNTER FOR SCREENING FOR MALIGNANT NEOPLASM OF PROSTATE: ICD-10-CM

## 2024-01-18 DIAGNOSIS — Z11.3 SCREENING EXAMINATION FOR SEXUALLY TRANSMITTED DISEASE: ICD-10-CM

## 2024-01-18 DIAGNOSIS — G89.29 OTHER CHRONIC PAIN: ICD-10-CM

## 2024-01-18 DIAGNOSIS — E11.8 CONTROLLED TYPE 2 DIABETES MELLITUS WITH COMPLICATION, WITHOUT LONG-TERM CURRENT USE OF INSULIN (HCC): ICD-10-CM

## 2024-01-18 DIAGNOSIS — L03.119 RECURRENT CELLULITIS OF LOWER EXTREMITY: ICD-10-CM

## 2024-01-18 DIAGNOSIS — E78.5 DYSLIPIDEMIA DUE TO TYPE 2 DIABETES MELLITUS (HCC): ICD-10-CM

## 2024-01-18 DIAGNOSIS — E11.59 HYPERTENSION ASSOCIATED WITH TYPE 2 DIABETES MELLITUS (HCC): ICD-10-CM

## 2024-01-18 DIAGNOSIS — J45.20 MILD INTERMITTENT ASTHMA WITHOUT COMPLICATION: ICD-10-CM

## 2024-01-18 DIAGNOSIS — K21.9 GASTROESOPHAGEAL REFLUX DISEASE WITHOUT ESOPHAGITIS: ICD-10-CM

## 2024-01-18 DIAGNOSIS — R07.89 OTHER CHEST PAIN: ICD-10-CM

## 2024-01-18 PROBLEM — R19.5 LOOSE STOOLS: Status: RESOLVED | Noted: 2022-10-19 | Resolved: 2024-01-18

## 2024-01-18 PROBLEM — S62.329A CLOSED FRACTURE OF SHAFT OF METACARPAL BONE: Status: RESOLVED | Noted: 2022-10-11 | Resolved: 2024-01-18

## 2024-01-18 PROCEDURE — 3079F DIAST BP 80-89 MM HG: CPT | Performed by: PHYSICIAN ASSISTANT

## 2024-01-18 PROCEDURE — 3077F SYST BP >= 140 MM HG: CPT | Performed by: PHYSICIAN ASSISTANT

## 2024-01-18 PROCEDURE — 99214 OFFICE O/P EST MOD 30 MIN: CPT | Performed by: PHYSICIAN ASSISTANT

## 2024-01-18 RX ORDER — ESOMEPRAZOLE MAGNESIUM 40 MG/1
40 CAPSULE, DELAYED RELEASE ORAL DAILY
Qty: 90 CAPSULE | Refills: 3 | Status: SHIPPED | OUTPATIENT
Start: 2024-01-18

## 2024-01-18 RX ORDER — LOSARTAN POTASSIUM 100 MG/1
100 TABLET ORAL DAILY
Qty: 90 TABLET | Refills: 3 | Status: SHIPPED | OUTPATIENT
Start: 2024-01-18

## 2024-01-18 RX ORDER — METFORMIN HYDROCHLORIDE 500 MG/1
1000 TABLET, EXTENDED RELEASE ORAL DAILY
Qty: 180 TABLET | Refills: 3 | Status: SHIPPED | OUTPATIENT
Start: 2024-01-18

## 2024-01-18 RX ORDER — DOXYCYCLINE HYCLATE 100 MG
TABLET ORAL
Qty: 80 TABLET | Refills: 1 | Status: SHIPPED | OUTPATIENT
Start: 2024-01-18

## 2024-01-18 RX ORDER — FLUTICASONE PROPIONATE AND SALMETEROL 100; 50 UG/1; UG/1
1 POWDER RESPIRATORY (INHALATION) 2 TIMES DAILY
Qty: 3 EACH | Refills: 3 | Status: SHIPPED | OUTPATIENT
Start: 2024-01-18

## 2024-01-18 RX ORDER — MONTELUKAST SODIUM 10 MG/1
10 TABLET ORAL DAILY
Qty: 90 TABLET | Refills: 3 | Status: SHIPPED | OUTPATIENT
Start: 2024-01-18

## 2024-01-18 RX ORDER — AMLODIPINE BESYLATE 10 MG/1
10 TABLET ORAL DAILY
Qty: 90 TABLET | Refills: 1 | Status: SHIPPED | OUTPATIENT
Start: 2024-01-18

## 2024-01-18 RX ORDER — LORAZEPAM 0.5 MG/1
.5-1 TABLET ORAL NIGHTLY PRN
Qty: 10 TABLET | Refills: 0 | Status: SHIPPED | OUTPATIENT
Start: 2024-01-18 | End: 2024-01-26

## 2024-01-18 RX ORDER — PREDNISONE 20 MG/1
TABLET ORAL
Qty: 90 TABLET | Refills: 1 | Status: SHIPPED | OUTPATIENT
Start: 2024-01-18

## 2024-01-18 RX ORDER — ZOLPIDEM TARTRATE 10 MG/1
TABLET ORAL
Qty: 90 TABLET | Refills: 0 | Status: SHIPPED | OUTPATIENT
Start: 2024-01-18 | End: 2024-04-18

## 2024-01-18 RX ORDER — CELECOXIB 200 MG/1
200 CAPSULE ORAL 2 TIMES DAILY
Qty: 180 CAPSULE | Refills: 1 | Status: SHIPPED | OUTPATIENT
Start: 2024-01-18

## 2024-01-18 ASSESSMENT — ENCOUNTER SYMPTOMS
FEVER: 0
SHORTNESS OF BREATH: 0
CHILLS: 0

## 2024-01-18 ASSESSMENT — FIBROSIS 4 INDEX: FIB4 SCORE: 1.39

## 2024-01-18 NOTE — PROGRESS NOTES
Subjective:     CC: Follow-up on chronic issues    HPI:   French presents today with the following:    Problem   Recurrent Cellulitis of Lower Extremity    Chronic, intermittent.  Mostly controlled.  Too hot to wear compression stockings.  Declines diuretics.  Denies orthopnea and PND.  Does have intermittently chronic shortness of breath but has significant asthma.  Patient has issues with chronically intermittent cellulitis of left lower extremity that frequently presents in the same manner.  Patient has doxycycline on hand in case this happens, as it happens frequently.     Other Chest Pain    Chronic, uncontrolled.  Started about a year ago, around February 2023.  Continues to have intermittent issues.  At rest when he noticed it.  NO PAIN WITH EXERTION.  Feels better when he stretches.  Denies orthopnea, PND.  Intermittent SOB - history of known asthma.    CT cardiac score 2019: Total Calcium Score: 13.4     Nuc med stress test 2019:          Left Leg Swelling    Chronic, intermittent.  Mostly controlled.  Too hot to wear compression stockings.  Declines diuretics.   Denies orthopnea and PND.  Does have intermittently chronic shortness of breath but has significant asthma.  Patient has issues with chronically intermittent cellulitis of left lower extremity that frequently presents in the same manner.  Patient has doxycycline on hand in case this happens, as it happens frequently.       Dyslipidemia Due to Type 2 Diabetes Mellitus (Hcc)    Chronic, uncontrolled.  LDL controlled.  Triglycerides remain elevated.    Latest Labs:   Lab Results   Component Value Date/Time    CHOLSTRLTOT 158 11/30/2022 10:01 AM    LDL 76 11/30/2022 10:01 AM    HDL 36 (A) 11/30/2022 10:01 AM    TRIGLYCERIDE 230 (H) 11/30/2022 10:01 AM      Medications: Atorvastatin 20 chronic, may need to add fenofibrate 145 mg  Medication side effects: None  Diet/Exercise: No exercise currently due to a recently fractured hand; tries to make good food  choices.  Risk calculator: The 10-year ASCVD risk score (Jojo ROJAS, et al., 2019) is: 20%        Controlled Type 2 Diabetes Mellitus With Complication, Without Long-Term Current Use of Insulin (Hcc)    Chronic, stable.     Current medications:  Biguanide: Metformin XR 1000 mg twice daily  GLP1-RA: Trulicity caused loose stool; may still try Ozempic, given the help with weight loss.      Last A1c: 5.8% (6/2023) ; 6.1% (11/30/2022); 6.3% (2/2022)  Last Microalb/Cr ratio: 3.7 (11/2022)  Last diabetic foot exam: 4/12/2023  Last retinal eye exam: 8/2023  ACEi/ARB?  Losartan 100 mg.  Statin?  Atorvastatin 80 mg  Aspirin?  Advised 81 mg daily  Concomitant HTN?  Yes  Nightly foot checks?  No       Mild Intermittent Asthma Without Complication    Chronic, stable.  Tolerating Advair 100/50 when he remembers to take it.  Has been having some breathing issues over the last few weeks.  Encouraged him to take Advair daily as this will likely improve his breathing.  Followed by pulmonology.        Vitamin D Deficiency    Chronic, control unknown.     Gerd (Gastroesophageal Reflux Disease)    Chronic, stable.  Tolerating Nexium 40 mg daily.     Insomnia    Chronic, uncontrolled.  Patient is tolerating Ambien 10 mg but it does not always help and his insurance companies only covering 45 pills every 90 days.  Helps him fall asleep but is frequently waking up in the morning and feeling groggy during the day.  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  He is not able to try controlled release Ambien.  Will have him try lorazepam 0.5-1 mg nightly as needed for sleep, when he has the following day off.  He will let me know if this works or not.    May need to consider other medications     Hypertension Associated With Type 2 Diabetes Mellitus (Hcc)    Chronic, uncontrolled.  Continue losartan 100 mg daily.  Increasing amlodipine to 10 mg daily.  Follow-up in 2-3  "months.     Osteoarthritis    Chronic, intermittent.  Patient has diffuse joint pain intermittently that seems to be worsening over the years.  He has tried meloxicam and diclofenac without any change in symptoms.  He does not take it every day but when he needs it it does not seem to help.  Will try Celebrex 200 mg up to twice daily as needed for pain     Obstructive Sleep Apnea    Chronic, stable.  On CPAP.  Followed by pulmonology.        Loose Stools (Resolved)    Chronic, stable.  Patient reports urgent loose stools immediately following eating most meals.  Has not noticed any particular foods, stating it happens with most food.  Reports discomfort in the left upper quadrant before this happens.  Has been going on for at least a year or so.    Has used cholestyramine in the past but states everything is going fine without the medication.     Closed Fracture of Shaft of Metacarpal Bone (Resolved)   Leg Pain, Left (Resolved)         ROS:  Review of Systems   Constitutional:  Negative for chills and fever.   Respiratory:  Negative for shortness of breath.    Cardiovascular:  Negative for chest pain.       Objective:     Exam:  BP (!) 158/80 (BP Location: Left arm, Patient Position: Sitting, BP Cuff Size: Adult)   Pulse 62   Temp 36.4 °C (97.5 °F) (Temporal)   Resp 16   Ht 1.727 m (5' 8\")   Wt (!) 148 kg (326 lb 4.8 oz)   SpO2 95%   BMI 49.61 kg/m²  Body mass index is 49.61 kg/m².    Physical Exam  Vitals reviewed.   Constitutional:       General: He is not in acute distress.     Appearance: Normal appearance.   Pulmonary:      Effort: Pulmonary effort is normal.   Neurological:      General: No focal deficit present.      Mental Status: He is alert.   Psychiatric:         Mood and Affect: Mood normal.         Behavior: Behavior normal.         Judgment: Judgment normal.                     Monofilament testing with a 10 gram force: sensation intact: intact bilaterally  Visual Inspection: Feet without " maceration, ulcers, fissures.  Pedal pulses: intact bilaterally      Assessment & Plan:     58 y.o. male with the following -     1. Hypertension associated with type 2 diabetes mellitus (HCC)  Chronic, uncontrolled.  Continue losartan 100 mg daily.  Increase amlodipine to 10 mg daily.    - amLODIPine (NORVASC) 10 MG Tab; Take 1 Tablet by mouth every day.  Dispense: 90 Tablet; Refill: 1  - losartan (COZAAR) 100 MG Tab; Take 1 Tablet by mouth every day.  Dispense: 90 Tablet; Refill: 3    2. Controlled type 2 diabetes mellitus with complication, without long-term current use of insulin (HCC)  Chronic, controlled.  Continue metformin 1000 mg daily.    - HEMOGLOBIN A1C; Future  - MICROALBUMIN CREAT RATIO URINE; Future  - Diabetic Monofilament LE Exam  - metFORMIN ER (GLUCOPHAGE XR) 500 MG TABLET SR 24 HR; Take 2 Tablets by mouth every day.  Dispense: 180 Tablet; Refill: 3    3. Primary insomnia  Chronic, uncontrolled.  Continue Ambien 10 mg as needed and as directed but when patient has a day off he is going to try lorazepam instead.  Patient is not sleeping well with Ambien but it still works better than taking nothing.  His insurance is also only sending in 45 tablets per 90 days.    - LORazepam (ATIVAN) 0.5 MG Tab; Take 1-2 Tablets by mouth at bedtime as needed (sleep) for up to 10 days.  Dispense: 10 Tablet; Refill: 0  - zolpidem (AMBIEN) 10 MG Tab; TAKE 1 TABLET AT BEDTIME AS NEEDED FOR SLEEP FOR UP TO 90 DAYS  Dispense: 90 Tablet; Refill: 0    4. Recurrent cellulitis of lower extremity  Chronic, intermittent.  Patient has doxycycline and prednisone at home as needed for flares.  Patient just completed 1 course of doxycycline and while the redness and warmth has resolved, the pain is still mildly there.    - doxycycline (VIBRAMYCIN) 100 MG Tab; Take 1 tab by mouth twice daily for 10 days as needed for recurring cellulitis  Dispense: 80 Tablet; Refill: 1  - predniSONE (DELTASONE) 20 MG Tab; 3 tabs by mouth daily for  5 days as needed for flares (asthma or dermatitis)  Dispense: 90 Tablet; Refill: 1    5. Other chronic pain  Chronic, intermittent.  Trying Celebrex 200 mg twice daily as needed for pain.    6. Dyslipidemia due to type 2 diabetes mellitus (HCC)  Chronic, uncontrolled.  Due to repeat labs.  Continue atorvastatin 20 mg daily.  May need to consider adding fenofibrate 145 mg if triglycerides remain elevated.    - Lipid Profile; Future  - CT-CARDIAC SCORING; Future    7. Other chest pain  Chronic, intermittent.  Continue to monitor symptoms.  Repeating CT cardiac score.  Negative nuc med stress test 2019.  No pain with exertion.    8. Osteoarthritis of multiple joints, unspecified osteoarthritis type  Chronic, intermittent.  Trying Celebrex 200 mg up to twice daily as needed pain.    - celecoxib (CELEBREX) 200 MG Cap; Take 1 Capsule by mouth 2 times a day.  Dispense: 180 Capsule; Refill: 1    9. Mild intermittent asthma without complication  Chronic, currently stable.  Continue Singulair 10 mg daily, Advair 100-50, 1 puff 2 times daily.  Managed by pulmonology.    - montelukast (SINGULAIR) 10 MG Tab; Take 1 Tablet by mouth every day.  Dispense: 90 Tablet; Refill: 3  - fluticasone-salmeterol (ADVAIR DISKUS) 100-50 MCG/ACT AEROSOL POWDER, BREATH ACTIVATED; Inhale 1 Puff 2 times a day. Rinse mouth after each use.  Dispense: 3 Each; Refill: 3    10. BMI 45.0-49.9, adult (HCC)  Chronic, uncontrolled.  Check labs.    - CBC WITH DIFFERENTIAL; Future  - Comp Metabolic Panel; Future  - TSH WITH REFLEX TO FT4; Future    11. Gastroesophageal reflux disease without esophagitis  Chronic, stable.  Continue Nexium 40 mg daily.    - esomeprazole (NEXIUM) 40 MG delayed-release capsule; Take 1 Capsule by mouth every day.  Dispense: 90 Capsule; Refill: 3    12. Vitamin D deficiency  Chronic, control unknown.  Checking labs.    - VITAMIN D,25 HYDROXY (DEFICIENCY); Future    13. Encounter for screening for malignant neoplasm of prostate    -  PROSTATE SPECIFIC AG SCREENING; Future    14. Screening examination for sexually transmitted disease    - HIV AG/AB COMBO ASSAY SCREENING; Future      Repeat labs in the next few months.    Healthcare Maintenance:        Return in about 3 months (around 4/18/2024) for lab discussion, blood pressure.    Please note that this dictation was created using voice recognition software. I have made every reasonable attempt to correct obvious errors, but I expect that there are errors of grammar and possibly content that I did not discover before finalizing the note.

## 2024-01-22 ENCOUNTER — APPOINTMENT (OUTPATIENT)
Dept: RADIOLOGY | Facility: MEDICAL CENTER | Age: 59
End: 2024-01-22
Attending: PHYSICIAN ASSISTANT
Payer: COMMERCIAL

## 2024-01-22 ENCOUNTER — APPOINTMENT (OUTPATIENT)
Dept: RADIOLOGY | Facility: MEDICAL CENTER | Age: 59
End: 2024-01-22
Attending: PHYSICIAN ASSISTANT

## 2024-01-25 ENCOUNTER — HOSPITAL ENCOUNTER (OUTPATIENT)
Dept: RADIOLOGY | Facility: MEDICAL CENTER | Age: 59
End: 2024-01-25
Attending: PHYSICIAN ASSISTANT
Payer: COMMERCIAL

## 2024-01-25 DIAGNOSIS — E78.5 DYSLIPIDEMIA DUE TO TYPE 2 DIABETES MELLITUS (HCC): ICD-10-CM

## 2024-01-25 DIAGNOSIS — E11.69 DYSLIPIDEMIA DUE TO TYPE 2 DIABETES MELLITUS (HCC): ICD-10-CM

## 2024-01-25 PROCEDURE — 4410556 CT-CARDIAC SCORING (SELF PAY ONLY)

## 2024-01-26 DIAGNOSIS — F51.01 PRIMARY INSOMNIA: ICD-10-CM

## 2024-01-26 RX ORDER — LORAZEPAM 1 MG/1
1 TABLET ORAL NIGHTLY PRN
Qty: 90 TABLET | Refills: 0 | Status: SHIPPED | OUTPATIENT
Start: 2024-01-26 | End: 2024-04-25

## 2024-04-17 ENCOUNTER — HOSPITAL ENCOUNTER (OUTPATIENT)
Dept: LAB | Facility: MEDICAL CENTER | Age: 59
End: 2024-04-17
Attending: PHYSICIAN ASSISTANT
Payer: COMMERCIAL

## 2024-04-17 DIAGNOSIS — E55.9 VITAMIN D DEFICIENCY: ICD-10-CM

## 2024-04-17 DIAGNOSIS — E11.8 CONTROLLED TYPE 2 DIABETES MELLITUS WITH COMPLICATION, WITHOUT LONG-TERM CURRENT USE OF INSULIN (HCC): ICD-10-CM

## 2024-04-17 DIAGNOSIS — Z12.5 ENCOUNTER FOR SCREENING FOR MALIGNANT NEOPLASM OF PROSTATE: ICD-10-CM

## 2024-04-17 DIAGNOSIS — E78.5 DYSLIPIDEMIA DUE TO TYPE 2 DIABETES MELLITUS (HCC): ICD-10-CM

## 2024-04-17 DIAGNOSIS — Z11.3 SCREENING EXAMINATION FOR SEXUALLY TRANSMITTED DISEASE: ICD-10-CM

## 2024-04-17 DIAGNOSIS — E11.69 DYSLIPIDEMIA DUE TO TYPE 2 DIABETES MELLITUS (HCC): ICD-10-CM

## 2024-04-17 DIAGNOSIS — I10 PRIMARY HYPERTENSION: ICD-10-CM

## 2024-04-17 LAB
25(OH)D3 SERPL-MCNC: 29 NG/ML (ref 30–100)
ALBUMIN SERPL BCP-MCNC: 4.4 G/DL (ref 3.2–4.9)
ALBUMIN/GLOB SERPL: 1.6 G/DL
ALP SERPL-CCNC: 72 U/L (ref 30–99)
ALT SERPL-CCNC: 38 U/L (ref 2–50)
ANION GAP SERPL CALC-SCNC: 15 MMOL/L (ref 7–16)
AST SERPL-CCNC: 50 U/L (ref 12–45)
BASOPHILS # BLD AUTO: 0.7 % (ref 0–1.8)
BASOPHILS # BLD: 0.05 K/UL (ref 0–0.12)
BILIRUB SERPL-MCNC: 1 MG/DL (ref 0.1–1.5)
BUN SERPL-MCNC: 16 MG/DL (ref 8–22)
CALCIUM ALBUM COR SERPL-MCNC: 8.9 MG/DL (ref 8.5–10.5)
CALCIUM SERPL-MCNC: 9.2 MG/DL (ref 8.5–10.5)
CHLORIDE SERPL-SCNC: 100 MMOL/L (ref 96–112)
CHOLEST SERPL-MCNC: 176 MG/DL (ref 100–199)
CO2 SERPL-SCNC: 21 MMOL/L (ref 20–33)
CREAT SERPL-MCNC: 0.74 MG/DL (ref 0.5–1.4)
EOSINOPHIL # BLD AUTO: 0.19 K/UL (ref 0–0.51)
EOSINOPHIL NFR BLD: 2.5 % (ref 0–6.9)
ERYTHROCYTE [DISTWIDTH] IN BLOOD BY AUTOMATED COUNT: 45.5 FL (ref 35.9–50)
EST. AVERAGE GLUCOSE BLD GHB EST-MCNC: 140 MG/DL
FASTING STATUS PATIENT QL REPORTED: NORMAL
GFR SERPLBLD CREATININE-BSD FMLA CKD-EPI: 106 ML/MIN/1.73 M 2
GLOBULIN SER CALC-MCNC: 2.8 G/DL (ref 1.9–3.5)
GLUCOSE SERPL-MCNC: 123 MG/DL (ref 65–99)
HBA1C MFR BLD: 6.5 % (ref 4–5.6)
HCT VFR BLD AUTO: 46.4 % (ref 42–52)
HDLC SERPL-MCNC: 35 MG/DL
HGB BLD-MCNC: 16.5 G/DL (ref 14–18)
HIV 1+2 AB+HIV1 P24 AG SERPL QL IA: NORMAL
IMM GRANULOCYTES # BLD AUTO: 0.07 K/UL (ref 0–0.11)
IMM GRANULOCYTES NFR BLD AUTO: 0.9 % (ref 0–0.9)
LDLC SERPL CALC-MCNC: 91 MG/DL
LYMPHOCYTES # BLD AUTO: 1.97 K/UL (ref 1–4.8)
LYMPHOCYTES NFR BLD: 26.3 % (ref 22–41)
MCH RBC QN AUTO: 32.4 PG (ref 27–33)
MCHC RBC AUTO-ENTMCNC: 35.6 G/DL (ref 32.3–36.5)
MCV RBC AUTO: 91 FL (ref 81.4–97.8)
MONOCYTES # BLD AUTO: 0.92 K/UL (ref 0–0.85)
MONOCYTES NFR BLD AUTO: 12.3 % (ref 0–13.4)
NEUTROPHILS # BLD AUTO: 4.3 K/UL (ref 1.82–7.42)
NEUTROPHILS NFR BLD: 57.3 % (ref 44–72)
NRBC # BLD AUTO: 0 K/UL
NRBC BLD-RTO: 0 /100 WBC (ref 0–0.2)
PLATELET # BLD AUTO: 233 K/UL (ref 164–446)
PMV BLD AUTO: 12 FL (ref 9–12.9)
POTASSIUM SERPL-SCNC: 4.4 MMOL/L (ref 3.6–5.5)
PROT SERPL-MCNC: 7.2 G/DL (ref 6–8.2)
PSA SERPL-MCNC: 0.66 NG/ML (ref 0–4)
RBC # BLD AUTO: 5.1 M/UL (ref 4.7–6.1)
SODIUM SERPL-SCNC: 136 MMOL/L (ref 135–145)
TRIGL SERPL-MCNC: 248 MG/DL (ref 0–149)
TSH SERPL DL<=0.005 MIU/L-ACNC: 1.94 UIU/ML (ref 0.38–5.33)
WBC # BLD AUTO: 7.5 K/UL (ref 4.8–10.8)

## 2024-04-17 PROCEDURE — 80053 COMPREHEN METABOLIC PANEL: CPT

## 2024-04-17 PROCEDURE — 85025 COMPLETE CBC W/AUTO DIFF WBC: CPT

## 2024-04-17 PROCEDURE — 82043 UR ALBUMIN QUANTITATIVE: CPT

## 2024-04-17 PROCEDURE — 83036 HEMOGLOBIN GLYCOSYLATED A1C: CPT

## 2024-04-17 PROCEDURE — 80061 LIPID PANEL: CPT

## 2024-04-17 PROCEDURE — 82570 ASSAY OF URINE CREATININE: CPT

## 2024-04-17 PROCEDURE — 36415 COLL VENOUS BLD VENIPUNCTURE: CPT

## 2024-04-17 PROCEDURE — 84443 ASSAY THYROID STIM HORMONE: CPT

## 2024-04-17 PROCEDURE — 82306 VITAMIN D 25 HYDROXY: CPT

## 2024-04-17 PROCEDURE — 87389 HIV-1 AG W/HIV-1&-2 AB AG IA: CPT

## 2024-04-17 PROCEDURE — 84153 ASSAY OF PSA TOTAL: CPT

## 2024-04-18 ENCOUNTER — OFFICE VISIT (OUTPATIENT)
Dept: MEDICAL GROUP | Facility: PHYSICIAN GROUP | Age: 59
End: 2024-04-18
Payer: COMMERCIAL

## 2024-04-18 VITALS
HEART RATE: 90 BPM | SYSTOLIC BLOOD PRESSURE: 122 MMHG | HEIGHT: 68 IN | RESPIRATION RATE: 16 BRPM | DIASTOLIC BLOOD PRESSURE: 68 MMHG | BODY MASS INDEX: 47.74 KG/M2 | TEMPERATURE: 97.4 F | WEIGHT: 315 LBS | OXYGEN SATURATION: 96 %

## 2024-04-18 DIAGNOSIS — R07.89 OTHER CHEST PAIN: ICD-10-CM

## 2024-04-18 DIAGNOSIS — I15.2 HYPERTENSION ASSOCIATED WITH TYPE 2 DIABETES MELLITUS (HCC): ICD-10-CM

## 2024-04-18 DIAGNOSIS — J45.20 MILD INTERMITTENT ASTHMA WITHOUT COMPLICATION: ICD-10-CM

## 2024-04-18 DIAGNOSIS — E78.5 DYSLIPIDEMIA DUE TO TYPE 2 DIABETES MELLITUS (HCC): ICD-10-CM

## 2024-04-18 DIAGNOSIS — F51.01 PRIMARY INSOMNIA: ICD-10-CM

## 2024-04-18 DIAGNOSIS — M47.812 OSTEOARTHRITIS OF CERVICAL SPINE, UNSPECIFIED SPINAL OSTEOARTHRITIS COMPLICATION STATUS: ICD-10-CM

## 2024-04-18 DIAGNOSIS — E11.59 HYPERTENSION ASSOCIATED WITH TYPE 2 DIABETES MELLITUS (HCC): ICD-10-CM

## 2024-04-18 DIAGNOSIS — L03.119 RECURRENT CELLULITIS OF LOWER EXTREMITY: ICD-10-CM

## 2024-04-18 DIAGNOSIS — L40.9 PSORIASIS: ICD-10-CM

## 2024-04-18 DIAGNOSIS — E11.8 CONTROLLED TYPE 2 DIABETES MELLITUS WITH COMPLICATION, WITHOUT LONG-TERM CURRENT USE OF INSULIN (HCC): ICD-10-CM

## 2024-04-18 DIAGNOSIS — E55.9 VITAMIN D DEFICIENCY: ICD-10-CM

## 2024-04-18 DIAGNOSIS — E11.69 DYSLIPIDEMIA DUE TO TYPE 2 DIABETES MELLITUS (HCC): ICD-10-CM

## 2024-04-18 LAB
CREAT UR-MCNC: 51.04 MG/DL
MICROALBUMIN UR-MCNC: 4.5 MG/DL
MICROALBUMIN/CREAT UR: 88 MG/G (ref 0–30)

## 2024-04-18 PROCEDURE — 3078F DIAST BP <80 MM HG: CPT | Performed by: PHYSICIAN ASSISTANT

## 2024-04-18 PROCEDURE — 99214 OFFICE O/P EST MOD 30 MIN: CPT | Performed by: PHYSICIAN ASSISTANT

## 2024-04-18 PROCEDURE — 3074F SYST BP LT 130 MM HG: CPT | Performed by: PHYSICIAN ASSISTANT

## 2024-04-18 RX ORDER — FENOFIBRATE 145 MG/1
145 TABLET, COATED ORAL EVERY EVENING
Qty: 90 TABLET | Refills: 3 | Status: SHIPPED | OUTPATIENT
Start: 2024-04-18

## 2024-04-18 RX ORDER — ZOLPIDEM TARTRATE 10 MG/1
10 TABLET ORAL NIGHTLY PRN
Qty: 90 TABLET | Refills: 0 | Status: SHIPPED | OUTPATIENT
Start: 2024-04-18 | End: 2024-07-17

## 2024-04-18 RX ORDER — SEMAGLUTIDE 0.68 MG/ML
0.5 INJECTION, SOLUTION SUBCUTANEOUS
Qty: 3 ML | Refills: 11 | Status: SHIPPED | OUTPATIENT
Start: 2024-04-18 | End: 2024-04-18

## 2024-04-18 RX ORDER — ALPRAZOLAM 0.25 MG/1
0.25 TABLET ORAL NIGHTLY PRN
Qty: 90 TABLET | Refills: 0 | Status: SHIPPED | OUTPATIENT
Start: 2024-04-18 | End: 2024-07-17

## 2024-04-18 RX ORDER — ERGOCALCIFEROL 1.25 MG/1
50000 CAPSULE ORAL
Qty: 12 CAPSULE | Refills: 3 | Status: CANCELLED | OUTPATIENT
Start: 2024-04-18

## 2024-04-18 RX ORDER — SEMAGLUTIDE 0.68 MG/ML
0.25 INJECTION, SOLUTION SUBCUTANEOUS
Qty: 6 ML | Refills: 2 | Status: SHIPPED | OUTPATIENT
Start: 2024-04-18

## 2024-04-18 ASSESSMENT — ENCOUNTER SYMPTOMS
CHILLS: 0
SHORTNESS OF BREATH: 0
FEVER: 0

## 2024-04-18 ASSESSMENT — FIBROSIS 4 INDEX: FIB4 SCORE: 1.95

## 2024-04-18 ASSESSMENT — PATIENT HEALTH QUESTIONNAIRE - PHQ9: CLINICAL INTERPRETATION OF PHQ2 SCORE: 0

## 2024-04-18 NOTE — ASSESSMENT & PLAN NOTE
Chronic, uncontrolled.  Lorazepam 1 mg was working better but no longer.  Going back to Ambien 10mg.  Employer regulations won't allow Ambien CR.  Still waking in the middle of the night.  Trial (on a day off) adding alprazolam 0.25mg for middle of the night awakenings.

## 2024-04-18 NOTE — ASSESSMENT & PLAN NOTE
Chronic, controlled.  While A1C is at 6.5, it has increased slowing over the last 2 years.  Recommend ozempic.  Tolerating/continue:  Biguanide: Metformin XR 1000 mg twice daily (might remember once/week; work schedule makes this difficult)  GLP1-RA: Trulicity caused loose stool; will try Ozempic, given the help with weight loss.

## 2024-04-18 NOTE — ASSESSMENT & PLAN NOTE
Chronic, uncontrolled.  Has not had the fenofibrate 145 mg.   Refilling fenofibrate.  Tolerating/continue atorvastatin 20mg, although he has been forgetting to take.

## 2024-04-18 NOTE — PROGRESS NOTES
SUBJECTIVE:     CC: follow up labs    HPI:   French presents today with the following:    ASSESSMENT & PLAN by Problem:       Problem List Items Addressed This Visit       BMI 45.0-49.9, adult (HCC)     Chronic, stable.  Knows he needs to lose weight.  Sending Ozempic to try due to DMII.         Relevant Medications    Semaglutide,0.25 or 0.5MG/DOS, (OZEMPIC, 0.25 OR 0.5 MG/DOSE,) 2 MG/3ML Solution Pen-injector    Cervical spine degeneration     Chronic, intermittent.  Tolerating/continue tizanidine as needed for pain.         Controlled type 2 diabetes mellitus with complication, without long-term current use of insulin (HCC)     Chronic, controlled.  While A1C is at 6.5, it has increased slowing over the last 2 years.  Recommend ozempic.  Tolerating/continue:  Biguanide: Metformin XR 1000 mg twice daily (might remember once/week; work schedule makes this difficult)  GLP1-RA: Trulicity caused loose stool; will try Ozempic, given the help with weight loss.           Relevant Medications    Semaglutide,0.25 or 0.5MG/DOS, (OZEMPIC, 0.25 OR 0.5 MG/DOSE,) 2 MG/3ML Solution Pen-injector    Other Relevant Orders    MICROALBUMIN CREAT RATIO URINE    HEMOGLOBIN A1C    Dyslipidemia due to type 2 diabetes mellitus (HCC)     Chronic, uncontrolled.  Has not had the fenofibrate 145 mg.   Refilling fenofibrate.  Tolerating/continue atorvastatin 20mg, although he has been forgetting to take.          Relevant Medications    Semaglutide,0.25 or 0.5MG/DOS, (OZEMPIC, 0.25 OR 0.5 MG/DOSE,) 2 MG/3ML Solution Pen-injector    Other Relevant Orders    Lipid Profile    Hypertension associated with type 2 diabetes mellitus (HCC)     Chronic, controlled.   Tolerating/continue losartan 100 mg and amlodipine to 10 mgdaily.            Relevant Medications    fenofibrate (TRICOR) 145 MG Tab    Semaglutide,0.25 or 0.5MG/DOS, (OZEMPIC, 0.25 OR 0.5 MG/DOSE,) 2 MG/3ML Solution Pen-injector    Insomnia     Chronic, uncontrolled.  Lorazepam 1 mg was  working better but no longer.  Going back to Ambien 10mg.  Employer regulations won't allow Ambien CR.  Still waking in the middle of the night.  Trial (on a day off) adding alprazolam 0.25mg for middle of the night awakenings.           Relevant Medications    ALPRAZolam (XANAX) 0.25 MG Tab    zolpidem (AMBIEN) 10 MG Tab    Mild intermittent asthma without complication     Chronic, controlled.   Tolerating Advair 100/50 when he remembers to take it.         Other chest pain     Chronic, controlled.   Tizanidine does help with the chest pain.         Psoriasis     Chronic, controlled.   Tolerating/continue triamcinolone as needed and as directed.         Recurrent cellulitis of lower extremity     Chronic, intermittent.  Most recent flare was 1 week ago, just finished antibiotic.           Vitamin D deficiency     Chronic, uncontrolled.  29 (4/2024).  Restarted 2 OTC tabs, unsure of dose.         Relevant Orders    VITAMIN D,25 HYDROXY (DEFICIENCY)       Repeat labs in 3 months.      Return in about 3 months (around 7/18/2024), or if symptoms worsen or fail to improve, for lab discussion.        Healthcare Maintenance:      HPI:     Problem   Recurrent Cellulitis of Lower Extremity    Chronic, intermittent.  LLQ.  Mostly controlled.    Too hot to wear compression stockings.  Declines diuretics.  Denies orthopnea and PND.  Does have intermittently chronic shortness of breath but has significant asthma.  Patient has issues with chronically intermittent cellulitis of left lower extremity that frequently presents in the same manner.  Patient has doxycycline on hand in case this happens, as it happens frequently.     Other Chest Pain    Chronic, uncontrolled.  Started about a year ago, around February 2023.    Continues to have intermittent issues.  At rest when he noticed it.  NO PAIN WITH EXERTION.  Feels better when he stretches.  Denies orthopnea, PND.  Intermittent SOB - history of known asthma.  Tizanidine DOES  help the chest pain.    CT cardiac score 2019: Total Calcium Score: 13.4     Nuc med stress test 2019:          Dyslipidemia Due to Type 2 Diabetes Mellitus (Hcc)    Chronic, uncontrolled.  LDL controlled.  Triglycerides remain elevated.    Latest Labs:   Lab Results   Component Value Date/Time    CHOLSTRLTOT 176 04/17/2024 11:56 AM    LDL 91 04/17/2024 11:56 AM    HDL 35 (A) 04/17/2024 11:56 AM    TRIGLYCERIDE 248 (H) 04/17/2024 11:56 AM      Medications: Has not had the fenofibrate 145 mg. Tolerating/continue atorvastatin 20mg, although he has been forgetting to take.    Medication side effects: None  Diet/Exercise: No exercise currently due to a recently fractured hand; tries to make good food choices.  Risk calculator: The 10-year ASCVD risk score (Jojo ROJAS, et al., 2019) is: 21.9%        Controlled Type 2 Diabetes Mellitus With Complication, Without Long-Term Current Use of Insulin (Hcc)    Chronic, stable.     Current medications:  Biguanide: Metformin XR 1000 mg twice daily (might remember once/week; work schedule makes this difficult)  GLP1-RA: Trulicity caused loose stool; will try Ozempic, given the help with weight loss.      Last A1c: 6.5 (4/2024); 5.8% (6/2023) ; 6.1% (11/30/2022); 6.3% (2/2022)  Last Microalb/Cr ratio: 3.7 (11/2022)  Last diabetic foot exam: 4/12/2023  Last retinal eye exam: 8/2023  ACEi/ARB?  Losartan 100 mg.  Statin?  Atorvastatin 80 mg  Aspirin?  Advised 81 mg daily  Concomitant HTN?  Yes  Nightly foot checks?  No       Psoriasis    Chronic, stable.  Uses triamcinolone as needed.    Not currently having a flare.     Mild Intermittent Asthma Without Complication    Chronic, stable.  Tolerating Advair 100/50 when he remembers to take it.  Has been having some breathing issues over the last few weeks.  Encouraged him to take Advair daily as this will likely improve his breathing.  Followed by pulmonology.          Bmi 45.0-49.9, Adult (Hcc)    Chronic, stable.  Knows he needs to lose  "weight.  Considering Ozempic due to DMII.  8/2023: 324#  1/2024: 326#  4/2024: 323#     Vitamin D Deficiency    Chronic, uncontrolled.  29 (4/2024).  Restarted 2 OTC tabs, unsure of dose.     Insomnia    Chronic, uncontrolled.  Lorazepam 1 mg was working better but no longer.  Going back to Ambien 10mg.  Employer regulations won't allow Ambien CR.  Still waking in the middle of the night.  Trial (on a day off) adding alprazolam 0.25mg for middle of the night awakenings.    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       Hypertension Associated With Type 2 Diabetes Mellitus (Hcc)    Chronic, controlled.   Tolerating/continue losartan 100 mg and amlodipine to 10 mgdaily.        Cervical Spine Degeneration    Chronic, intermittent.  Tolerating/continue tizanidine as needed for pain.                  ROS:  Review of Systems   Constitutional:  Negative for chills and fever.   Respiratory:  Negative for shortness of breath.    Cardiovascular:  Negative for chest pain.       OBJECTIVE:     Exam:  /68 (BP Location: Left arm, Patient Position: Sitting, BP Cuff Size: Large adult)   Pulse 90   Temp 36.3 °C (97.4 °F) (Temporal)   Resp 16   Ht 1.727 m (5' 8\")   Wt (!) 147 kg (323 lb 11.2 oz)   SpO2 96%   BMI 49.22 kg/m²  Body mass index is 49.22 kg/m².    Physical Exam  Vitals reviewed.   Constitutional:       General: He is not in acute distress.     Appearance: Normal appearance.   Pulmonary:      Effort: Pulmonary effort is normal.   Neurological:      General: No focal deficit present.      Mental Status: He is alert.   Psychiatric:         Mood and Affect: Mood normal.         Behavior: Behavior normal.         Judgment: Judgment normal.           CHART REVIEW:     Labs:                     Please note that this dictation was created using voice recognition software. I have made every reasonable attempt to correct obvious errors, but I " expect that there are errors of grammar and possibly content that I did not discover before finalizing the note.

## 2024-04-23 ENCOUNTER — TELEPHONE (OUTPATIENT)
Dept: MEDICAL GROUP | Facility: PHYSICIAN GROUP | Age: 59
End: 2024-04-23
Payer: COMMERCIAL

## 2024-04-23 NOTE — TELEPHONE ENCOUNTER
FINAL PRIOR AUTHORIZATION STATUS:    1.  Name of Medication & Dose: Ozempic .25 Pen Injector     2. Prior Auth Status: Approved through 3/23/24-4/22/25

## 2024-05-01 RX ORDER — ATORVASTATIN CALCIUM 20 MG/1
20 TABLET, FILM COATED ORAL DAILY
Qty: 90 TABLET | Refills: 3 | Status: SHIPPED | OUTPATIENT
Start: 2024-05-01

## 2024-05-05 ENCOUNTER — OFFICE VISIT (OUTPATIENT)
Dept: URGENT CARE | Facility: PHYSICIAN GROUP | Age: 59
End: 2024-05-05
Payer: COMMERCIAL

## 2024-05-05 VITALS
HEIGHT: 68 IN | WEIGHT: 315 LBS | DIASTOLIC BLOOD PRESSURE: 70 MMHG | OXYGEN SATURATION: 97 % | RESPIRATION RATE: 20 BRPM | BODY MASS INDEX: 47.74 KG/M2 | HEART RATE: 86 BPM | TEMPERATURE: 98.1 F | SYSTOLIC BLOOD PRESSURE: 122 MMHG

## 2024-05-05 DIAGNOSIS — K04.7 DENTAL INFECTION: ICD-10-CM

## 2024-05-05 PROCEDURE — 3078F DIAST BP <80 MM HG: CPT | Performed by: FAMILY MEDICINE

## 2024-05-05 PROCEDURE — 99213 OFFICE O/P EST LOW 20 MIN: CPT | Performed by: FAMILY MEDICINE

## 2024-05-05 PROCEDURE — 3074F SYST BP LT 130 MM HG: CPT | Performed by: FAMILY MEDICINE

## 2024-05-05 RX ORDER — KETOROLAC TROMETHAMINE 30 MG/ML
30 INJECTION, SOLUTION INTRAMUSCULAR; INTRAVENOUS ONCE
Status: COMPLETED | OUTPATIENT
Start: 2024-05-05 | End: 2024-05-05

## 2024-05-05 RX ORDER — KETOROLAC TROMETHAMINE 30 MG/ML
60 INJECTION, SOLUTION INTRAMUSCULAR; INTRAVENOUS ONCE
Status: DISCONTINUED | OUTPATIENT
Start: 2024-05-05 | End: 2024-05-05

## 2024-05-05 RX ORDER — AMOXICILLIN AND CLAVULANATE POTASSIUM 875; 125 MG/1; MG/1
TABLET, FILM COATED ORAL
Qty: 20 TABLET | Refills: 0 | Status: SHIPPED | OUTPATIENT
Start: 2024-05-05 | End: 2024-05-15

## 2024-05-05 RX ADMIN — KETOROLAC TROMETHAMINE 30 MG: 30 INJECTION, SOLUTION INTRAMUSCULAR; INTRAVENOUS at 13:51

## 2024-05-05 RX ADMIN — KETOROLAC TROMETHAMINE 30 MG: 30 INJECTION, SOLUTION INTRAMUSCULAR; INTRAVENOUS at 13:50

## 2024-05-05 ASSESSMENT — FIBROSIS 4 INDEX: FIB4 SCORE: 1.95

## 2024-05-05 NOTE — PROGRESS NOTES
Chief Complaint:    Chief Complaint   Patient presents with    Facial Swelling     Pt had three teeth pulled Thursday, pt has swelling and numbness left side of his face and can't open his jaw very far pt sts he is having pain pt was given hydrocodone but sts it didn't help much       History of Present Illness:    Had dental extractions done on 5/2/24 - 2 on right side, 1 on left. On 5/3/24, he started with pain and swelling in left cheek which have progressively worsened. Review of chart shows he has tolerated Ceftriaxone IM, Augmentin, and Toradol IM in the past.      Past Medical History:    Past Medical History:   Diagnosis Date    Allergy     ASTHMA     Back pain     BPH (benign prostatic hypertrophy)     Breast mass     right side between 11 and 12 o'clock at nipple    Cervical spine degeneration     Chest pain     Difficulty breathing     Earache     ED (erectile dysfunction)     High cholesterol     Hypercholesteremia     Hypertension     Obesity     Osteoarthritis     Prediabetes     Right arm pain     Ringing in ears     Sleep apnea     on CPAP    Sweat, sweating, excessive     Wears glasses      Past Surgical History:    Past Surgical History:   Procedure Laterality Date    COLONOSCOPY - ENDO  8/9/2019    Procedure: COLONOSCOPY - AVERAGE RISK SCREENING;  Surgeon: Gopal Dinh M.D.;  Location: SURGERY [facility];  Service: Gastroenterology    HARDWARE REMOVAL ORTHO Right 2017    right hand    CERVICAL DISK AND FUSION ANTERIOR  2017    C5,6,7    APPENDECTOMY  2001    OTHER ORTHOPEDIC SURGERY Left 1993    L wrist, R hand-4th and 5th metacarpal    LIGAMENT RECONSTRUCTION Left 1991    ulna    ORIF, ANKLE Right 1987    HARDWARE REMOVAL ORTHO Right 1987    right ankle    TONSILLECTOMY  1983    TONSILLECTOMY      VASECTOMY      x2     Social History:    Social History     Socioeconomic History    Marital status:      Spouse name: Not on file    Number of children: Not on file    Years of  education: Not on file    Highest education level: Not on file   Occupational History     Employer: OTHER     Comment: pt is adopted and does not know family history   Tobacco Use    Smoking status: Former     Current packs/day: 0.00     Average packs/day: 0.5 packs/day for 0.5 years (0.2 ttl pk-yrs)     Types: Cigarettes     Start date: 1981     Quit date: 1982     Years since quittin.2    Smokeless tobacco: Never   Vaping Use    Vaping Use: Never used   Substance and Sexual Activity    Alcohol use: Yes     Alcohol/week: 1.2 oz     Types: 2 Standard drinks or equivalent per week    Drug use: No     Comment: hx of ephedirn for weight loss over 2 years ago    Sexual activity: Yes     Partners: Female   Other Topics Concern    Not on file   Social History Narrative    Not on file     Social Determinants of Health     Financial Resource Strain: Not on file   Food Insecurity: Not on file   Transportation Needs: Not on file   Physical Activity: Not on file   Stress: Not on file   Social Connections: Not on file   Intimate Partner Violence: Not on file   Housing Stability: Not on file     Family History:    Family History   Problem Relation Age of Onset    Sleep Apnea Father     Sleep Apnea Sister      Medications:    Current Outpatient Medications on File Prior to Visit   Medication Sig Dispense Refill    atorvastatin (LIPITOR) 20 MG Tab TAKE 1 TABLET DAILY 90 Tablet 3    ALPRAZolam (XANAX) 0.25 MG Tab Take 1 Tablet by mouth at bedtime as needed for Sleep for up to 90 days. DO NOT TAKE WITH AMBIEN. USE FOR MIDDLE OF THE NIGHT AWAKENINGS ONLY. 90 Tablet 0    zolpidem (AMBIEN) 10 MG Tab Take 1 Tablet by mouth at bedtime as needed for Sleep for up to 90 days. DO NOT TAKE WITH ALPRAZOLAM 90 Tablet 0    fenofibrate (TRICOR) 145 MG Tab Take 1 Tablet by mouth every evening. TRIGLYCERIDES 90 Tablet 3    Semaglutide,0.25 or 0.5MG/DOS, (OZEMPIC, 0.25 OR 0.5 MG/DOSE,) 2 MG/3ML Solution Pen-injector Inject 0.25 mg  under the skin every 7 days. 6 mL 2    celecoxib (CELEBREX) 200 MG Cap Take 1 Capsule by mouth 2 times a day. 180 Capsule 1    amLODIPine (NORVASC) 10 MG Tab Take 1 Tablet by mouth every day. 90 Tablet 1    esomeprazole (NEXIUM) 40 MG delayed-release capsule Take 1 Capsule by mouth every day. 90 Capsule 3    montelukast (SINGULAIR) 10 MG Tab Take 1 Tablet by mouth every day. 90 Tablet 3    fluticasone-salmeterol (ADVAIR DISKUS) 100-50 MCG/ACT AEROSOL POWDER, BREATH ACTIVATED Inhale 1 Puff 2 times a day. Rinse mouth after each use. 3 Each 3    metFORMIN ER (GLUCOPHAGE XR) 500 MG TABLET SR 24 HR Take 2 Tablets by mouth every day. 180 Tablet 3    doxycycline (VIBRAMYCIN) 100 MG Tab Take 1 tab by mouth twice daily for 10 days as needed for recurring cellulitis 80 Tablet 1    predniSONE (DELTASONE) 20 MG Tab 3 tabs by mouth daily for 5 days as needed for flares (asthma or dermatitis) 90 Tablet 1    azithromycin (ZITHROMAX) 250 MG Tab TAKE 2 TABLETS BY MOUTH FOR 1 DAY THEN TAKE 1 TABLET BY MOUTH EVERY DAY FOR 4 DAYS      clotrimazole-betamethasone (LOTRISONE) 1-0.05 % Cream Apply to affected area up to 3 times daily as needed for rash. 45 g 1    tizanidine (ZANAFLEX) 4 MG Tab Take 1 Tablet by mouth at bedtime as needed (pain). 90 Tablet 3    albuterol (PROVENTIL) 2.5mg/3ml Nebu Soln solution for nebulization Take 3 mL by nebulization every four hours as needed for Shortness of Breath. 120 mL 11    albuterol (VENTOLIN HFA) 108 (90 Base) MCG/ACT Aero Soln inhalation aerosol Inhale 2 Puffs every four hours as needed for Shortness of Breath. 3 Each 3    triamcinolone acetonide (KENALOG) 0.5 % Cream Apply to affected  area up to 4 times daily as needed for rash/itching. Max use is 14 days in a row. 454 g 1    cholestyramine (QUESTRAN) 4 g packet Take 4 g by mouth every day. 30 Each 0    Levocetirizine Dihydrochloride 5 MG Tab Take  by mouth.      hydrOXYzine HCl (ATARAX) 25 MG Tab Take 1 Tab by mouth 3 times a day as needed  "for Itching. 20 Tab 0    fexofenadine (ALLEGRA) 180 MG tablet Take 180 mg by mouth 1 time daily as needed.      losartan (COZAAR) 100 MG Tab Take 1 Tablet by mouth every day. 90 Tablet 3     No current facility-administered medications on file prior to visit.     Allergies:    No Known Allergies      Vitals:    Vitals:    24 1311   BP: 122/70   BP Location: Left arm   Patient Position: Sitting   BP Cuff Size: Large adult   Pulse: 86   Resp: 20   Temp: 36.7 °C (98.1 °F)   TempSrc: Temporal   SpO2: 97%   Weight: (!) 147 kg (323 lb 12.8 oz)   Height: 1.727 m (5' 8\")       Physical Exam:    Constitutional: Vital signs reviewed. Appears well-developed and well-nourished. No acute distress.   Eyes: Sclera white, conjunctivae clear.   ENT: Left cheek is markedly swollen compared to right cheek. Left cheek is indurated and at least moderately tender to palpation. Has trouble opening mouth due to pain in left cheek. Recent extraction site left lower posterior molar. External ears normal. Hearing normal. Lips are normal. Oral mucosa pink and moist.   Neck: Neck supple.   Pulmonary/Chest: Respirations non-labored.   Musculoskeletal: Normal gait. No muscular atrophy or weakness.  Neurological: Alert and oriented to person, place, and time. Muscle tone normal. Coordination normal.   Skin: No rashes or lesions. Warm, dry, normal turgor.  Psychiatric: Normal mood and affect. Behavior is normal. Judgment and thought content normal.       Assessment / Plan & Medical Decision Makin. Dental infection  - cefTRIAXone (Rocephin) 1 g in lidocaine (Xylocaine) 1 % 4 mL for IM use  - amoxicillin-clavulanate (AUGMENTIN) 875-125 MG Tab; 1 TAB BY MOUTH TWICE A DAY X 10 DAYS. TAKE WITH FOOD.  Dispense: 20 Tablet; Refill: 0  - ketorolac (Toradol) injection 30 mg  - ketorolac (Toradol) injection 30 mg        Work note given - excuse for 5/5 thru and including 24.    Discussed with him DDX, management options, and risks, benefits, " and alternatives to treatment plan agreed upon.    Had dental extractions done on 5/2/24 - 2 on right side, 1 on left. On 5/3/24, he started with pain and swelling in left cheek which have progressively worsened. Review of chart shows he has tolerated Ceftriaxone IM, Augmentin, and Toradol IM in the past.    Left cheek is markedly swollen compared to right cheek. Left cheek is indurated and at least moderately tender to palpation. Has trouble opening mouth due to pain in left cheek. Recent extraction site left lower posterior molar.     Due to severity of symptoms and exam findings, offered aggressive antibiotic treatment with IM and p.o. antibiotics and potent anti-inflammatory treatment with Toradol IM. He would like all.    Agreeable to medications given and prescribed.    Discussed expected course of duration, time for improvement, and to seek follow-up in Emergency Room, urgent care, or with PCP if getting worse at any time or not improving within expected time frame.

## 2024-05-05 NOTE — LETTER
May 5, 2024         Patient: French Hurd   YOB: 1965   Date of Visit: 5/5/2024           To Whom it May Concern:    French Hurd was seen in my clinic on 5/5/2024.     Please excuse from work for 5/5 thru and including 5/8/24 due to medical condition.     If you have any questions or concerns, please don't hesitate to call.        Sincerely,           Beau Kapoor M.D.  Electronically Signed

## 2024-05-07 DIAGNOSIS — K08.89 PAIN, DENTAL: ICD-10-CM

## 2024-05-07 RX ORDER — OXYCODONE HYDROCHLORIDE AND ACETAMINOPHEN 5; 325 MG/1; MG/1
1 TABLET ORAL EVERY 8 HOURS PRN
Qty: 20 TABLET | Refills: 0 | Status: SHIPPED | OUTPATIENT
Start: 2024-05-07 | End: 2024-05-14

## 2024-05-07 RX ORDER — OXYCODONE HYDROCHLORIDE AND ACETAMINOPHEN 5; 325 MG/1; MG/1
1 TABLET ORAL EVERY 8 HOURS PRN
Qty: 20 TABLET | Refills: 0 | Status: SHIPPED | OUTPATIENT
Start: 2024-05-07 | End: 2024-05-07

## 2024-05-15 DIAGNOSIS — F51.01 PRIMARY INSOMNIA: ICD-10-CM

## 2024-05-15 RX ORDER — ZOLPIDEM TARTRATE 10 MG/1
10 TABLET ORAL NIGHTLY PRN
Qty: 90 TABLET | Refills: 1 | Status: SHIPPED | OUTPATIENT
Start: 2024-05-15 | End: 2024-08-13

## 2024-05-22 DIAGNOSIS — M47.812 OSTEOARTHRITIS OF CERVICAL SPINE, UNSPECIFIED SPINAL OSTEOARTHRITIS COMPLICATION STATUS: ICD-10-CM

## 2024-05-22 DIAGNOSIS — F51.01 PRIMARY INSOMNIA: ICD-10-CM

## 2024-05-23 RX ORDER — ZOLPIDEM TARTRATE 10 MG/1
TABLET ORAL
Qty: 90 TABLET | Refills: 0 | OUTPATIENT
Start: 2024-05-23

## 2024-05-23 RX ORDER — SEMAGLUTIDE 0.68 MG/ML
0.5 INJECTION, SOLUTION SUBCUTANEOUS
Qty: 6 ML | Refills: 2 | Status: SHIPPED | OUTPATIENT
Start: 2024-05-23

## 2024-05-28 RX ORDER — TIZANIDINE 4 MG/1
4 TABLET ORAL
Qty: 90 TABLET | Refills: 3 | Status: SHIPPED | OUTPATIENT
Start: 2024-05-28

## 2024-06-12 RX ORDER — SEMAGLUTIDE 1.34 MG/ML
1 INJECTION, SOLUTION SUBCUTANEOUS
Qty: 9 ML | Refills: 1 | Status: SHIPPED | OUTPATIENT
Start: 2024-06-12

## 2024-07-25 ENCOUNTER — OFFICE VISIT (OUTPATIENT)
Dept: URGENT CARE | Facility: PHYSICIAN GROUP | Age: 59
End: 2024-07-25
Payer: COMMERCIAL

## 2024-07-25 VITALS
DIASTOLIC BLOOD PRESSURE: 82 MMHG | HEART RATE: 70 BPM | OXYGEN SATURATION: 98 % | TEMPERATURE: 97.6 F | WEIGHT: 315 LBS | SYSTOLIC BLOOD PRESSURE: 130 MMHG | HEIGHT: 68 IN | BODY MASS INDEX: 47.74 KG/M2 | RESPIRATION RATE: 16 BRPM

## 2024-07-25 DIAGNOSIS — M54.16 LUMBAR RADICULOPATHY: ICD-10-CM

## 2024-07-25 PROCEDURE — 3075F SYST BP GE 130 - 139MM HG: CPT | Performed by: FAMILY MEDICINE

## 2024-07-25 PROCEDURE — 99213 OFFICE O/P EST LOW 20 MIN: CPT | Performed by: FAMILY MEDICINE

## 2024-07-25 PROCEDURE — 3079F DIAST BP 80-89 MM HG: CPT | Performed by: FAMILY MEDICINE

## 2024-07-25 RX ORDER — PREDNISONE 20 MG/1
TABLET ORAL
Qty: 11 TABLET | Refills: 0 | Status: SHIPPED | OUTPATIENT
Start: 2024-07-25

## 2024-07-25 ASSESSMENT — ENCOUNTER SYMPTOMS: FEVER: 0

## 2024-07-25 ASSESSMENT — FIBROSIS 4 INDEX: FIB4 SCORE: 1.95

## 2024-08-21 ENCOUNTER — HOSPITAL ENCOUNTER (OUTPATIENT)
Dept: LAB | Facility: MEDICAL CENTER | Age: 59
End: 2024-08-21
Attending: PHYSICIAN ASSISTANT
Payer: COMMERCIAL

## 2024-08-21 DIAGNOSIS — E11.8 CONTROLLED TYPE 2 DIABETES MELLITUS WITH COMPLICATION, WITHOUT LONG-TERM CURRENT USE OF INSULIN (HCC): ICD-10-CM

## 2024-08-21 DIAGNOSIS — E55.9 VITAMIN D DEFICIENCY: ICD-10-CM

## 2024-08-21 DIAGNOSIS — E11.69 DYSLIPIDEMIA DUE TO TYPE 2 DIABETES MELLITUS (HCC): ICD-10-CM

## 2024-08-21 DIAGNOSIS — E78.5 DYSLIPIDEMIA DUE TO TYPE 2 DIABETES MELLITUS (HCC): ICD-10-CM

## 2024-08-21 LAB
25(OH)D3 SERPL-MCNC: 37 NG/ML (ref 30–100)
CHOLEST SERPL-MCNC: 169 MG/DL (ref 100–199)
EST. AVERAGE GLUCOSE BLD GHB EST-MCNC: 120 MG/DL
FASTING STATUS PATIENT QL REPORTED: NORMAL
HBA1C MFR BLD: 5.8 % (ref 4–5.6)
HDLC SERPL-MCNC: 37 MG/DL
LDLC SERPL CALC-MCNC: 107 MG/DL
TRIGL SERPL-MCNC: 126 MG/DL (ref 0–149)

## 2024-08-21 PROCEDURE — 36415 COLL VENOUS BLD VENIPUNCTURE: CPT

## 2024-08-21 PROCEDURE — 82306 VITAMIN D 25 HYDROXY: CPT

## 2024-08-21 PROCEDURE — 83036 HEMOGLOBIN GLYCOSYLATED A1C: CPT

## 2024-08-21 PROCEDURE — 80061 LIPID PANEL: CPT

## 2024-08-26 ENCOUNTER — OFFICE VISIT (OUTPATIENT)
Dept: MEDICAL GROUP | Facility: PHYSICIAN GROUP | Age: 59
End: 2024-08-26
Payer: COMMERCIAL

## 2024-08-26 VITALS
SYSTOLIC BLOOD PRESSURE: 136 MMHG | DIASTOLIC BLOOD PRESSURE: 66 MMHG | OXYGEN SATURATION: 96 % | BODY MASS INDEX: 47.74 KG/M2 | WEIGHT: 315 LBS | TEMPERATURE: 97.2 F | RESPIRATION RATE: 16 BRPM | HEIGHT: 68 IN | HEART RATE: 89 BPM

## 2024-08-26 DIAGNOSIS — R07.81 RIB PAIN: ICD-10-CM

## 2024-08-26 DIAGNOSIS — E11.59 HYPERTENSION ASSOCIATED WITH TYPE 2 DIABETES MELLITUS (HCC): ICD-10-CM

## 2024-08-26 DIAGNOSIS — E11.8 CONTROLLED TYPE 2 DIABETES MELLITUS WITH COMPLICATION, WITHOUT LONG-TERM CURRENT USE OF INSULIN (HCC): ICD-10-CM

## 2024-08-26 DIAGNOSIS — E55.9 VITAMIN D DEFICIENCY: ICD-10-CM

## 2024-08-26 DIAGNOSIS — I15.2 HYPERTENSION ASSOCIATED WITH TYPE 2 DIABETES MELLITUS (HCC): ICD-10-CM

## 2024-08-26 DIAGNOSIS — R19.5 LOOSE STOOLS: ICD-10-CM

## 2024-08-26 DIAGNOSIS — E78.5 DYSLIPIDEMIA DUE TO TYPE 2 DIABETES MELLITUS (HCC): ICD-10-CM

## 2024-08-26 DIAGNOSIS — J30.2 SEASONAL ALLERGIES: ICD-10-CM

## 2024-08-26 DIAGNOSIS — E11.69 DYSLIPIDEMIA DUE TO TYPE 2 DIABETES MELLITUS (HCC): ICD-10-CM

## 2024-08-26 PROCEDURE — 99214 OFFICE O/P EST MOD 30 MIN: CPT | Performed by: PHYSICIAN ASSISTANT

## 2024-08-26 PROCEDURE — 3075F SYST BP GE 130 - 139MM HG: CPT | Performed by: PHYSICIAN ASSISTANT

## 2024-08-26 PROCEDURE — 3078F DIAST BP <80 MM HG: CPT | Performed by: PHYSICIAN ASSISTANT

## 2024-08-26 RX ORDER — LEVOCETIRIZINE DIHYDROCHLORIDE 5 MG/1
1 TABLET, FILM COATED ORAL DAILY
Qty: 90 TABLET | Refills: 3 | Status: SHIPPED | OUTPATIENT
Start: 2024-08-26

## 2024-08-26 RX ORDER — TIRZEPATIDE 7.5 MG/.5ML
7.5 INJECTION, SOLUTION SUBCUTANEOUS
Qty: 6 ML | Refills: 1 | Status: SHIPPED | OUTPATIENT
Start: 2024-08-26

## 2024-08-26 ASSESSMENT — ENCOUNTER SYMPTOMS
CHILLS: 0
FEVER: 0
SHORTNESS OF BREATH: 0

## 2024-08-26 ASSESSMENT — FIBROSIS 4 INDEX: FIB4 SCORE: 1.98

## 2024-08-26 NOTE — ASSESSMENT & PLAN NOTE
Chronic, stable.  Knows he needs to lose weight.  Considering Ozempic due to DMII.  8/2023: 324#  1/2024: 326#  4/2024: 323#  8/2024: 321#

## 2024-08-26 NOTE — ASSESSMENT & PLAN NOTE
Chronic, uncontrolled.  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     (2) good, crying

## 2024-08-26 NOTE — PROGRESS NOTES
SUBJECTIVE:     CC: follow up chronic issues    HPI:   French presents today with the following:    ASSESSMENT & PLAN by Problem:     Problem   Rib Pain    Chronic, uncontrolled.  RUQ US ordered.  Suspect musculoskeletal.  Patient will let me know if symptoms worsen.     Dyslipidemia Due to Type 2 Diabetes Mellitus (Hcc)    Chronic, uncontrolled.   LDL, mildly elevated.  Triglycerides improved since restarting fenofibrate.  Tolerating/continue fenofibrate 145 mg and atorvastatin 20mg.       Controlled Type 2 Diabetes Mellitus With Complication, Without Long-Term Current Use of Insulin (Hcc)    Chronic, stable.     Current medications:  Biguanide: Metformin XR 1000 mg twice daily (might remember once/week; work schedule makes this difficult)  GLP1-RA: Trulicity caused loose stool; Ozempic seemed to worsen stools.  Trial Mounjaro.     Loose Stools    Chronic, uncontrolled.  Check RUQ US.     Bmi 45.0-49.9, Adult (Hcc)    Chronic, stable.  Knows he needs to lose weight.  Considering Ozempic due to DMII.  8/2023: 324#  1/2024: 326#  4/2024: 323#  8/2024: 321#     Vitamin D Deficiency    Chronic, stable.   Tolerating/continue 2 OTC tabs, unsure of dose.     Hypertension Associated With Type 2 Diabetes Mellitus (Hcc)    Chronic, controlled.   Tolerating/continue losartan 100 mg and amlodipine to 10 mgdaily.            Normal PSA 4/17/2024.      Return in about 10 months (around 6/26/2025), or if symptoms worsen or fail to improve.      HPI:     Problem List Items Addressed This Visit       BMI 45.0-49.9, adult (HCC)     Chronic, stable.  Knows he needs to lose weight.  Considering Ozempic due to DMII.  8/2023: 324#  1/2024: 326#  4/2024: 323#  8/2024: 321#         Relevant Medications    Tirzepatide (MOUNJARO) 7.5 MG/0.5ML Solution Pen-injector    Controlled type 2 diabetes mellitus with complication, without long-term current use of insulin (HCC)     Chronic, stable.     Current medications:  Biguanide: Metformin XR 1000  mg twice daily (might remember once/week; work schedule makes this difficult)  GLP1-RA: Trulicity caused loose stool; Ozempic seemed to worsen stools.  Trial Mounjaro.    Last A1c: 6.5 (4/2024); 5.8% (6/2023) ; 6.1% (11/30/2022); 6.3% (2/2022)  Last Microalb/Cr ratio: 88 (4/2024); 3.7 (11/2022)  Last diabetic foot exam: 4/12/2023  Last retinal eye exam: 8/2023  ACEi/ARB?  Losartan 100 mg.  Statin?  Atorvastatin 80 mg  Aspirin?  Advised 81 mg daily  Concomitant HTN?  Yes  Nightly foot checks?  No           Relevant Medications    Tirzepatide (MOUNJARO) 7.5 MG/0.5ML Solution Pen-injector    Other Relevant Orders    POCT Retinal Eye Exam    Dyslipidemia due to type 2 diabetes mellitus (HCC)     Chronic, uncontrolled.   LDL, mildly elevated.  Triglycerides improved since restarting fenofibrate.    Latest Labs:   Lab Results   Component Value Date/Time    CHOLSTRLTOT 169 08/21/2024 10:29 AM     (H) 08/21/2024 10:29 AM    HDL 37 (A) 08/21/2024 10:29 AM    TRIGLYCERIDE 126 08/21/2024 10:29 AM      Medications: Tolerating/continue fenofibrate 145 mg and atorvastatin 20mg.    Medication side effects: None  Diet/Exercise: No exercise currently due to a recently fractured hand; tries to make good food choices.  Risk calculator: The 10-year ASCVD risk score (Jojo DK, et al., 2019) is: 15.9%          Relevant Medications    Tirzepatide (MOUNJARO) 7.5 MG/0.5ML Solution Pen-injector    Hypertension associated with type 2 diabetes mellitus (HCC)     Chronic, controlled.   Tolerating/continue losartan 100 mg and amlodipine to 10 mgdaily.            Relevant Medications    Tirzepatide (MOUNJARO) 7.5 MG/0.5ML Solution Pen-injector    Loose stools     Chronic, uncontrolled.  Symptom onset: months  Started prior to restarting metformin  Worsened with ozempic (now on 1mg)  Loose stool 15 minutes after eating  No issues with drinking  Will switch to Mounjaro  Will also order RUQ US    Interval history 1/2024:  Chronic,  "stable.  Patient reports urgent loose stools immediately following eating most meals.  Has not noticed any particular foods, stating it happens with most food.  Reports discomfort in the left upper quadrant before this happens.  Has been going on for at least a year or so.    Has used cholestyramine in the past but states everything is going fine without the medication.         Relevant Orders    US-RUQ    Rib pain     Chronic, uncontrolled.  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           Vitamin D deficiency     Chronic, stable.   Tolerating/continue 2 OTC tabs, unsure of dose.          Other Visit Diagnoses       Seasonal allergies        Relevant Medications    Levocetirizine Dihydrochloride 5 MG Tab                   ROS:  Review of Systems   Constitutional:  Negative for chills and fever.   Respiratory:  Negative for shortness of breath.    Cardiovascular:  Negative for chest pain.       OBJECTIVE:     Exam:  /66 (BP Location: Left arm, Patient Position: Sitting, BP Cuff Size: Large adult)   Pulse 89   Temp 36.2 °C (97.2 °F) (Temporal)   Resp 16   Ht 1.727 m (5' 8\")   Wt (!) 146 kg (321 lb 4.8 oz)   SpO2 96%   BMI 48.85 kg/m²  Body mass index is 48.85 kg/m².    Physical Exam  Vitals reviewed.   Constitutional:       General: He is not in acute distress.     Appearance: Normal appearance.   Pulmonary:      Effort: Pulmonary effort is normal.   Neurological:      General: No focal deficit present.      Mental Status: He is alert.   Psychiatric:         Mood and Affect: Mood normal.         Behavior: Behavior normal.         Judgment: Judgment normal.           CHART REVIEW:     Labs:                       Please note that this dictation was created using voice recognition software. I have made every reasonable attempt to correct obvious errors, but I expect that there are errors of grammar and possibly content " that I did not discover before finalizing the note.

## 2024-08-26 NOTE — ASSESSMENT & PLAN NOTE
Chronic, uncontrolled.   LDL, mildly elevated.  Triglycerides improved since restarting fenofibrate.    Latest Labs:   Lab Results   Component Value Date/Time    CHOLSTRLTOT 169 08/21/2024 10:29 AM     (H) 08/21/2024 10:29 AM    HDL 37 (A) 08/21/2024 10:29 AM    TRIGLYCERIDE 126 08/21/2024 10:29 AM      Medications: Tolerating/continue fenofibrate 145 mg and atorvastatin 20mg.    Medication side effects: None  Diet/Exercise: No exercise currently due to a recently fractured hand; tries to make good food choices.  Risk calculator: The 10-year ASCVD risk score (Jojo DK, et al., 2019) is: 15.9%

## 2024-08-26 NOTE — ASSESSMENT & PLAN NOTE
Chronic, stable.     Current medications:  Biguanide: Metformin XR 1000 mg twice daily (might remember once/week; work schedule makes this difficult)  GLP1-RA: Trulicity caused loose stool; Ozempic seemed to worsen stools.  Trial Mounjaro.    Last A1c: 6.5 (4/2024); 5.8% (6/2023) ; 6.1% (11/30/2022); 6.3% (2/2022)  Last Microalb/Cr ratio: 88 (4/2024); 3.7 (11/2022)  Last diabetic foot exam: 4/12/2023  Last retinal eye exam: 8/2023  ACEi/ARB?  Losartan 100 mg.  Statin?  Atorvastatin 80 mg  Aspirin?  Advised 81 mg daily  Concomitant HTN?  Yes  Nightly foot checks?  No

## 2024-08-28 ENCOUNTER — TELEPHONE (OUTPATIENT)
Dept: MEDICAL GROUP | Facility: PHYSICIAN GROUP | Age: 59
End: 2024-08-28
Payer: COMMERCIAL

## 2024-08-28 NOTE — TELEPHONE ENCOUNTER
FINAL PRIOR AUTHORIZATION STATUS:    1.  Name of Medication & Dose: Mounjaro 7.5 mg/0.5 Pen Injector     2. Prior Auth Status: Approved through 7/29/24-8/28/2025

## 2024-09-03 LAB — RETINAL SCREEN: NEGATIVE

## 2024-09-04 ENCOUNTER — HOSPITAL ENCOUNTER (OUTPATIENT)
Dept: RADIOLOGY | Facility: MEDICAL CENTER | Age: 59
End: 2024-09-04
Attending: PHYSICIAN ASSISTANT
Payer: COMMERCIAL

## 2024-09-04 DIAGNOSIS — R19.5 LOOSE STOOLS: ICD-10-CM

## 2024-09-04 PROCEDURE — 76705 ECHO EXAM OF ABDOMEN: CPT

## 2024-10-30 DIAGNOSIS — F51.01 PRIMARY INSOMNIA: ICD-10-CM

## 2024-10-30 RX ORDER — ZOLPIDEM TARTRATE 10 MG/1
10 TABLET ORAL NIGHTLY PRN
Qty: 90 EACH | Refills: 1 | Status: SHIPPED | OUTPATIENT
Start: 2024-10-30 | End: 2025-04-28

## 2024-11-21 DIAGNOSIS — F51.01 PRIMARY INSOMNIA: ICD-10-CM

## 2024-11-21 RX ORDER — ZOLPIDEM TARTRATE 10 MG/1
10 TABLET ORAL NIGHTLY PRN
Qty: 90 EACH | Refills: 1 | Status: SHIPPED | OUTPATIENT
Start: 2024-11-21 | End: 2025-05-20

## 2024-12-28 ENCOUNTER — OFFICE VISIT (OUTPATIENT)
Dept: URGENT CARE | Facility: PHYSICIAN GROUP | Age: 59
End: 2024-12-28
Payer: COMMERCIAL

## 2024-12-28 VITALS
TEMPERATURE: 97.4 F | RESPIRATION RATE: 16 BRPM | SYSTOLIC BLOOD PRESSURE: 130 MMHG | BODY MASS INDEX: 47.74 KG/M2 | HEIGHT: 68 IN | WEIGHT: 315 LBS | OXYGEN SATURATION: 97 % | HEART RATE: 70 BPM | DIASTOLIC BLOOD PRESSURE: 80 MMHG

## 2024-12-28 DIAGNOSIS — R09.81 NASAL CONGESTION: ICD-10-CM

## 2024-12-28 PROCEDURE — 3079F DIAST BP 80-89 MM HG: CPT

## 2024-12-28 PROCEDURE — 3075F SYST BP GE 130 - 139MM HG: CPT

## 2024-12-28 PROCEDURE — 99212 OFFICE O/P EST SF 10 MIN: CPT

## 2024-12-28 ASSESSMENT — ENCOUNTER SYMPTOMS
CHILLS: 0
HEADACHES: 0
FEVER: 0
DIZZINESS: 0
DIARRHEA: 0
VOMITING: 0
ABDOMINAL PAIN: 0
NAUSEA: 0
PALPITATIONS: 0
HEMOPTYSIS: 0
MYALGIAS: 0
WHEEZING: 0

## 2024-12-28 ASSESSMENT — FIBROSIS 4 INDEX: FIB4 SCORE: 1.98

## 2024-12-28 NOTE — PROGRESS NOTES
Office Note    Benjamin Calero  : 1953  PCP: Rosemary Delaney DO    Reason for Visit:  Chief Complaint   Patient presents with   • Follow-up     No complaints        History of Present Illness:  Benjamin Calero is a 70 year old man with PMHx of  4V CABG in , ICM EF now 50%, PAF and prior maze and TD ligation.  He is doing well clinically.   He notes some mild fatigue.  Holter from  showed sinus throughout and no afib.  Atrial ectopy was < 1%.  Peak HRs on holter were blunted 83 but cate appropriately to 129 bpm on ETT.  There was no ischemic changes on his stress ekg test.   He has not had recent afib and he has had prior TD ligation.   We stopped his eliquis and he is now on ecasa daily.      He denies angina, SOB, palpitations, presyncope or syncope.         PMHx:  Past Medical History:   Diagnosis Date   • A-fib (CMD)    • Alcoholism (CMD)     15 yrs sober   • Arthritis    • Asthma    • Atrial flutter (CMD)    • Congestive cardiac failure (CMD)     echo-ef @ 30%   • HTN (hypertension)    • SOB (shortness of breath)        PSHx:  Past Surgical History:   Procedure Laterality Date   • Cardiac catherization     • Coronary angiogram/possible ptca - cv  09/10/2021    RIGHT HEART CATH - CV.LEFT HEART CATH - CV. LV ANGIOGRAM. ULTRASOUND ACCESS.   • Coronary artery bypass graft  10/06/2021    CORONARY ARTERY BYPASS GRAFT X 4 WITH LEFT INTERNAL MAMMARY ARTERY AND ENDOSCOPIC  VEIN HARVESTING FROM LEFT LEG; ABLATION WITH LEFT ATRIAL APPENDAGE RESECTION.   • Knee arthroscopy w/ meniscal repair         Family Hx:  Family History   Problem Relation Age of Onset   • Hypertension Mother    • Hyperlipidemia Mother    • Dementia/Alzheimers Mother    • Hypertension Father    • Heart disease Father    • Hyperlipidemia Father    • Diabetes Maternal Grandmother        Social Hx:   reports that he quit smoking about 33 years ago. His smoking use included cigarettes. He has never used smokeless tobacco. He  Subjective:     Pleasant patient presenting with sinus congestion, throat irritation causing him to have a mild cough  x4 days. Denies SOB. No fevers/chills. PMH asthma, he feels it is well controlled at this time, uses albuterol / nebulizers at home which helps. Denies history of pneumonia, COPD.     Review of Systems   Constitutional:  Negative for chills and fever.   HENT:  Negative for ear pain.    Respiratory:  Negative for hemoptysis and wheezing.    Cardiovascular:  Negative for chest pain and palpitations.   Gastrointestinal:  Negative for abdominal pain, diarrhea, nausea and vomiting.   Musculoskeletal:  Negative for myalgias.   Neurological:  Negative for dizziness and headaches.   All other systems reviewed and are negative.      Medications:    albuterol Aers  albuterol Nebu  amLODIPine Tabs  atorvastatin Tabs  celecoxib Caps  cholestyramine  clotrimazole-betamethasone Crea  doxycycline Tabs  esomeprazole  fenofibrate Tabs  fexofenadine  fluticasone-salmeterol Aepb  hydrOXYzine HCl Tabs  Levocetirizine Dihydrochloride Tabs  losartan Tabs  metFORMIN ER Tb24  montelukast Tabs  Mounjaro Sopn  predniSONE Tabs  tizanidine Tabs  triamcinolone acetonide Crea  zolpidem Tabs    Allergies: Patient has no known allergies.    Problem List: French Morejon Vickey does not have any pertinent problems on file.    Surgical History:  Past Surgical History:   Procedure Laterality Date    COLONOSCOPY - ENDO  8/9/2019    Procedure: COLONOSCOPY - AVERAGE RISK SCREENING;  Surgeon: Gopal Dinh M.D.;  Location: SURGERY [facility];  Service: Gastroenterology    HARDWARE REMOVAL ORTHO Right 2017    right hand    CERVICAL DISK AND FUSION ANTERIOR  2017    C5,6,7    APPENDECTOMY  2001    OTHER ORTHOPEDIC SURGERY Left 1993    L wrist, R hand-4th and 5th metacarpal    LIGAMENT RECONSTRUCTION Left 1991    ulna    ORIF, ANKLE Right 1987    HARDWARE REMOVAL ORTHO Right 1987    right ankle    TONSILLECTOMY  1983     "TONSILLECTOMY      VASECTOMY      x2       Past Social Hx: French Hurd  reports that he quit smoking about 42 years ago. His smoking use included cigarettes. He started smoking about 43 years ago. He has a 0.2 pack-year smoking history. He has never used smokeless tobacco. He reports current alcohol use of about 1.2 oz of alcohol per week. He reports that he does not use drugs.     Past Family Hx:  French Hurd family history includes Sleep Apnea in his father and sister.     Problem list, medications, and allergies reviewed by myself today in Epic.     Objective:     /80 (BP Location: Right arm, Patient Position: Sitting, BP Cuff Size: Large adult)   Pulse 70   Temp 36.3 °C (97.4 °F)   Resp 16   Ht 1.727 m (5' 8\")   Wt (!) 147 kg (323 lb 6.6 oz)   SpO2 97%   BMI 49.18 kg/m²     Physical Exam  Vitals reviewed.   Constitutional:       Appearance: Normal appearance.   HENT:      Head: Normocephalic and atraumatic.      Right Ear: External ear normal.      Left Ear: External ear normal.      Nose: Congestion present.      Mouth/Throat:      Mouth: Mucous membranes are moist.      Pharynx: Postnasal drip present.   Eyes:      Conjunctiva/sclera: Conjunctivae normal.   Cardiovascular:      Rate and Rhythm: Normal rate.   Pulmonary:      Effort: Pulmonary effort is normal.   Skin:     General: Skin is warm and dry.      Capillary Refill: Capillary refill takes less than 2 seconds.   Neurological:      Mental Status: He is alert and oriented to person, place, and time.       Assessment/Plan:     Diagnosis and associated orders:     1. Nasal congestion           Comments/MDM:     Politely declined Viral testing and breathing treatment. He is not in acute exacerbation at this time. VSS, not in apparent distress. Lungs clear, no adventitious lung sound heard - no wheezing or diminished bases. Well-appearing and states he feels well in general.   Home in stable condition, continue taking inhalers as " reports that he does not currently use drugs. He reports that he does not drink alcohol.    Allergies:  ALLERGIES:   Allergen Reactions   • Cat Dander Other (See Comments)     Starts asthma attacks   • Pollen Other (See Comments)       Medications:  Current Outpatient Medications   Medication Sig Dispense Refill   • bumetanide (BUMEX) 1 MG tablet TAKE ONE TABLET BY MOUTH TWICE DAILY 180 tablet 0   • atorvastatin (LIPITOR) 80 MG tablet TAKE ONE TABLET BY MOUTH ONE TIME DAILY 90 tablet 1   • metoPROLOL succinate (TOPROL-XL) 25 MG 24 hr tablet TAKE ONE TABLET BY MOUTH AT BEDTIME 90 tablet 1   • doxazosin (CARDURA) 2 MG tablet Take 2 mg by mouth nightly.     • triamcinolone (ARISTOCORT) 0.1 % ointment Use to apply to the red patches on the lower legs twice daily on wet skin for 4 weeks. Stop when clear and apply Vaseline. 80 g 0   • lisinopril (ZESTRIL) 5 MG tablet Take 1 tablet by mouth daily. 90 tablet 1   • aspirin (ECOTRIN) 81 MG EC tablet Take 81 mg by mouth daily.     • albuterol 108 (90 Base) MCG/ACT inhaler INHALE 2 PUFFS BY MOUTH EVERY 4 HOURS AS NEEDED FOR WHEEZE OR SHORTNESS OF BREATH (Patient taking differently: Inhale 2 puffs into the lungs every 4 hours as needed for Shortness of Breath or Wheezing.) 18 each 2   • acetaminophen (TYLENOL) 325 MG tablet Take 650 mg by mouth Every 6 hours as needed (pain).        No current facility-administered medications for this visit.       Review of Systems:  Review of Systems   Constitutional: Positive for malaise/fatigue. Negative for chills, fever, weight gain and weight loss.   HENT: Negative for hearing loss.    Eyes: Negative for visual disturbance.        Patient denies significant visual changes   Cardiovascular: Negative for chest pain, claudication, dyspnea on exertion, irregular heartbeat, near-syncope, palpitations and syncope.        Negative except for what's indicated in HPI   Respiratory: Negative for cough, hemoptysis and shortness of breath.     Hematologic/Lymphatic: Negative for bleeding problem. Does not bruise/bleed easily.   Skin: Negative for rash and suspicious lesions.   Musculoskeletal: Negative for arthritis and falls.   Gastrointestinal: Negative for hematochezia and melena.   Genitourinary: Negative for hematuria.   Neurological: Negative for light-headedness and loss of balance.        No localized deficits   Psychiatric/Behavioral: Negative for depression and hallucinations.   Allergic/Immunologic: Negative for environmental allergies.        No new food allergies     All other systems were reviewed and were negative.       Physical Exam:  Visit Vitals  Ht 6' (1.829 m)   Wt 86 kg (189 lb 7.8 oz)   BMI 25.70 kg/m²       Gen: no acute distress, with his wife.  He looks good in the office today  Neuro: alert and oriented x 3  HEENT: symmetric, wears glasses  Mouth:  membranes moist  Neck: no jvd  CV: regular s1s2 no murmur  Chest well healed cabg scar  Pulm: clear  GI: soft, non-tender, no rebound or guarding  :  no cva tenderness  Gait:  normal  Ext: no sig edema  Skin: no rashes      Data:   HOLTER 8/24/21:  Aflutter 80% of study, intermittent NSR.  Average HR was 76 bpm    ECHO 8/2021:  EF 30%, moderate LAE, RVSP 30 mmHg, mild AI, mild MR    POST CABG ECHO 10/10/21:  EF 47%;  mod LAE    Labs July 2021:  , amio labs okay    Amio labs and lipids ordered for 2/8/22    ECHO 2/22:  EF 53%, mild AI, mild MR, PA 32 mmHg    Holter 2/22:  NSR 50;   HR range 39 to 64 bpm.    No ectopy, no afib    HOLTER 3/22:  Sinus 53;  range 41 to 104 bpm.  Rare insignificant ectopy < 1% total.     ETT 5/23:  Peak  bpm (84%).  No ischemia    HOLTER 5/23:  NSR 63 bpm;  HR range 52 to 83 bpm.   Atrial ectopy < 1%, PVCs 0.2%.      Labs 2/23:  LDL 61;  LFTs normal;  creat 1.1;  Hgb 13      Assessment/Plan:  Benjamin Calero is a 70 year old man with PMHx of 4V CABG in 2021, ICM EF now 50%, PAF and prior maze and TD ligation.  He is doing well clinically.    needed - discussed SMART therapy. May continue to use OTC Flonase and Citirizine.   Discussed typical length and natural progression. Instructed to return to clinic or nearest emergency department for any change in condition, further concerns, or worsening symptoms.         Differential diagnosis, natural history, supportive care, and indications for immediate follow-up discussed. Advised the patient to follow-up with PCP for recheck, reevaluation, and consideration of further management.    Adele La DNP, APRN, FNP-BC   He notes some mild fatigue.  Holter from 5/23 showed sinus throughout and no afib.  Atrial ectopy was < 1%.  Peak HRs on holter were blunted 83 but cate appropriately to 129 bpm on ETT.  There was no ischemic changes on his stress ekg test.   He has not had recent afib and he has had prior TD ligation.   We stopped his eliquis and he is now on ecasa daily.      Diagnosis:  Patient Active Problem List   Diagnosis   • Mild intermittent asthma without complication   • Hypertension, essential   • Alcohol abuse, in remission   • Tobacco abuse, in remission   • Benign non-nodular prostatic hyperplasia with lower urinary tract symptoms   • Vitiligo   • Former smoker   • Epistaxis   • History of colon polyps   • Elevated PSA   • Uncomplicated asthma   • Paroxysmal atrial flutter (CMD)   • Long term current use of anticoagulant   • Cardiomyopathy (CMD)   • Chronic systolic congestive heart failure (CMD)   • Hx of CABG   • Coronary artery disease involving native coronary artery of native heart with angina pectoris (CMD)   • Ischemic cardiomyopathy   • S/P left atrial appendage ligation   • Post pericardiotomy syndrome   • Paroxysmal atrial fibrillation (CMD)   • Hyperlipidemia, mixed   • Near syncope   • Other fatigue   • History of maze procedure   • Drug-induced bradycardia   • Mild mitral and aortic regurgitation         IMPRESSION:  4V CABG 10/6/21  Maze and TD resection Oct 2021  ICM EF 30% preop, now 53%  PAF, post MAZE 10/2021  Bradycardia, resolved off meds  Eliquis  HTN  Hyperlipidemia, LDL at goal  Former smoker  Asthma  Mild AI and MR        PLAN:    Holter and ETT 5/23 reviewed;   low peak HRs on holter but peak  bpm on ETT.  Atrial ectopy <1%, no afib.     No role for pacer    Eliquis stopped 6/23.  No recent afib, prior TD ligation and maze.  Continue ecasa    Repeat lipids CBC and CMP in 3 months and 9 months    Repeat echo 5/24  (mild AI and MR)    SHARAN VAN one year        Dougie Van MD  08/08/23

## 2025-01-02 ENCOUNTER — APPOINTMENT (OUTPATIENT)
Dept: SLEEP MEDICINE | Facility: MEDICAL CENTER | Age: 60
End: 2025-01-02
Attending: NURSE PRACTITIONER
Payer: COMMERCIAL

## 2025-01-06 ENCOUNTER — OFFICE VISIT (OUTPATIENT)
Dept: SLEEP MEDICINE | Facility: MEDICAL CENTER | Age: 60
End: 2025-01-06
Attending: NURSE PRACTITIONER
Payer: COMMERCIAL

## 2025-01-06 VITALS
HEART RATE: 85 BPM | HEIGHT: 68 IN | WEIGHT: 315 LBS | BODY MASS INDEX: 47.74 KG/M2 | OXYGEN SATURATION: 96 % | RESPIRATION RATE: 12 BRPM | SYSTOLIC BLOOD PRESSURE: 138 MMHG | DIASTOLIC BLOOD PRESSURE: 68 MMHG

## 2025-01-06 DIAGNOSIS — G47.33 OSA (OBSTRUCTIVE SLEEP APNEA): ICD-10-CM

## 2025-01-06 DIAGNOSIS — E66.01 SEVERE OBESITY (HCC): ICD-10-CM

## 2025-01-06 PROCEDURE — 99213 OFFICE O/P EST LOW 20 MIN: CPT | Performed by: NURSE PRACTITIONER

## 2025-01-06 PROCEDURE — 3075F SYST BP GE 130 - 139MM HG: CPT | Performed by: NURSE PRACTITIONER

## 2025-01-06 PROCEDURE — 99214 OFFICE O/P EST MOD 30 MIN: CPT | Performed by: NURSE PRACTITIONER

## 2025-01-06 PROCEDURE — 3078F DIAST BP <80 MM HG: CPT | Performed by: NURSE PRACTITIONER

## 2025-01-06 ASSESSMENT — FIBROSIS 4 INDEX: FIB4 SCORE: 1.98

## 2025-01-06 ASSESSMENT — PATIENT HEALTH QUESTIONNAIRE - PHQ9: CLINICAL INTERPRETATION OF PHQ2 SCORE: 0

## 2025-01-06 NOTE — PROGRESS NOTES
Chief Complaint   Patient presents with    Follow-Up     SLEEP - ESTABLISHED PT CHART PREP COMPLETED ON 12/31/2024     Last Office Visit 03/22/2022 with Dr Mobley    1st Compliance: No     DME: [facility]    PAP/O2/OAT: AirSense 11 AutoSet Min Pressure (cmH2O)  16.0  Max Pressure (cmH2O)  20.0     Wireless Data? YES [employer]       HPI:  French Hurd is a 59 y.o. year old male here today for follow-up on JOSSY follow-up.  Last seen 11/23/2022 for pulmonary clinic and 3/22/2022 for sleep clinic. PMHx of asthma, GERD, environmental allergies, JOSSY on CPAP, insomnia.      Currently using nasal pillows with CPAP.  Denies any difficulty with mask fit or pressures.  Does have 2 different devices.  He does use 1 while sleeping at his job and uses the other at home.  Notes improvement in sleep quality and denies any excessive daytime sleepiness, morning headaches, palpitations, concentration or memory problems.     Today compliance reviewed with patient shows 70% use with an average time of 9 hours and 28 minutes and a residual AHI of 0.4.  No evidence of persistent mask leak.    First sleep study results:  TYPE: Split-night in lab  DATE: May 30, 2016  Diagnostic:AHI: 102.3   diagnostic  Oxygen Juan: 50.0%  TREATMENT AHI: 7.8  TREATMENT Oxygen Juan: 83.0% and 9.4 minutes under 88% in the total sleep time  TITRATION: CPAP from 9-18 cmH2O.     Based on that study the patient was started on CPAP of 18 cm water pressure.  Patient started Pap therapy in 2016.      ROS: As per HPI and otherwise negative if not stated.    Past Medical History:   Diagnosis Date    Allergy     ASTHMA     Back pain     BPH (benign prostatic hypertrophy)     Breast mass     right side between 11 and 12 o'clock at nipple    Cervical spine degeneration     Chest pain     Difficulty breathing     Earache     ED (erectile dysfunction)     High cholesterol     Hypercholesteremia     Hypertension     Obesity     Osteoarthritis     Prediabetes     Right arm  "pain     Ringing in ears     Sleep apnea     on CPAP    Sweat, sweating, excessive     Wears glasses        Past Surgical History:   Procedure Laterality Date    COLONOSCOPY - ENDO  8/9/2019    Procedure: COLONOSCOPY - AVERAGE RISK SCREENING;  Surgeon: Gopal Dinh M.D.;  Location: SURGERY [facility];  Service: Gastroenterology    HARDWARE REMOVAL ORTHO Right 2017    right hand    CERVICAL DISK AND FUSION ANTERIOR  2017    C5,6,7    APPENDECTOMY  2001    OTHER ORTHOPEDIC SURGERY Left 1993    L wrist, R hand-4th and 5th metacarpal    LIGAMENT RECONSTRUCTION Left 1991    ulna    ORIF, ANKLE Right 1987    HARDWARE REMOVAL ORTHO Right 1987    right ankle    TONSILLECTOMY  1983    TONSILLECTOMY      VASECTOMY      x2       Family History   Problem Relation Age of Onset    Sleep Apnea Father     Sleep Apnea Sister        Allergies as of 01/06/2025    (No Known Allergies)        Vitals:  /68 (BP Location: Left arm, Patient Position: Sitting, BP Cuff Size: Adult)   Pulse 85   Resp 12   Ht 1.727 m (5' 8\")   Wt (!) 145 kg (320 lb)   SpO2 96%     Current medications as of today   Current Outpatient Medications   Medication Sig Dispense Refill    zolpidem (AMBIEN) 10 MG Tab Take 1 Tablet by mouth at bedtime as needed for Sleep for up to 180 days. Each prescription is a 90 day supply 90 Each 1    Levocetirizine Dihydrochloride 5 MG Tab Take 1 Tablet by mouth every day. 90 Tablet 3    Tirzepatide (MOUNJARO) 7.5 MG/0.5ML Solution Pen-injector Inject 7.5 mg under the skin every 7 days. 6 mL 1    tizanidine (ZANAFLEX) 4 MG Tab TAKE 1 TABLET AT BEDTIME AS NEEDED FOR PAIN 90 Tablet 3    atorvastatin (LIPITOR) 20 MG Tab TAKE 1 TABLET DAILY 90 Tablet 3    fenofibrate (TRICOR) 145 MG Tab Take 1 Tablet by mouth every evening. TRIGLYCERIDES 90 Tablet 3    amLODIPine (NORVASC) 10 MG Tab Take 1 Tablet by mouth every day. 90 Tablet 1    esomeprazole (NEXIUM) 40 MG delayed-release capsule Take 1 Capsule by mouth every " day. 90 Capsule 3    losartan (COZAAR) 100 MG Tab Take 1 Tablet by mouth every day. 90 Tablet 3    montelukast (SINGULAIR) 10 MG Tab Take 1 Tablet by mouth every day. 90 Tablet 3    fluticasone-salmeterol (ADVAIR DISKUS) 100-50 MCG/ACT AEROSOL POWDER, BREATH ACTIVATED Inhale 1 Puff 2 times a day. Rinse mouth after each use. 3 Each 3    metFORMIN ER (GLUCOPHAGE XR) 500 MG TABLET SR 24 HR Take 2 Tablets by mouth every day. 180 Tablet 3    doxycycline (VIBRAMYCIN) 100 MG Tab Take 1 tab by mouth twice daily for 10 days as needed for recurring cellulitis 80 Tablet 1    clotrimazole-betamethasone (LOTRISONE) 1-0.05 % Cream Apply to affected area up to 3 times daily as needed for rash. 45 g 1    albuterol (PROVENTIL) 2.5mg/3ml Nebu Soln solution for nebulization Take 3 mL by nebulization every four hours as needed for Shortness of Breath. 120 mL 11    albuterol (VENTOLIN HFA) 108 (90 Base) MCG/ACT Aero Soln inhalation aerosol Inhale 2 Puffs every four hours as needed for Shortness of Breath. 3 Each 3    cholestyramine (QUESTRAN) 4 g packet Take 4 g by mouth every day. 30 Each 0    hydrOXYzine HCl (ATARAX) 25 MG Tab Take 1 Tab by mouth 3 times a day as needed for Itching. 20 Tab 0    fexofenadine (ALLEGRA) 180 MG tablet Take 180 mg by mouth 1 time daily as needed.      predniSONE (DELTASONE) 20 MG Tab Take 2 tabs (40mg) daily x 3 days, then take 1 tab (20mg) daily x 3 days, then take 1/2 tab (10mg) daily x 4 days 11 Tablet 0    celecoxib (CELEBREX) 200 MG Cap Take 1 Capsule by mouth 2 times a day. 180 Capsule 1    triamcinolone acetonide (KENALOG) 0.5 % Cream Apply to affected  area up to 4 times daily as needed for rash/itching. Max use is 14 days in a row. 454 g 1     No current facility-administered medications for this visit.         Physical Exam:   Gen:           Alert and oriented, No apparent distress. Mood and affect appropriate, normal interaction with examiner.  Eyes:          PERRL, EOM intact, sclere white,  conjunctive moist.  Ears:          Not examined.   Hearing:     Grossly intact.  Nose:          Normal, no lesions or deformities.  Dentition:    Good dentition.  Oropharynx:   Tongue normal, posterior pharynx without erythema or exudate.  Neck:        Supple, trachea midline, no masses.  Respiratory Effort: No intercostal retractions or use of accessory muscles.   Lung Auscultation:      Clear to auscultation bilaterally; no rales, rhonchi or wheezing.  CV:            Regular rate and rhythm. No murmurs, rubs or gallops.  Abd:           Not examined.   Lymphadenopathy: Not examined.  Gait and Station: Normal.  Digits and Nails: No clubbing, cyanosis, petechiae, or nodes.   Cranial Nerves: II-XII grossly intact.  Skin:        No rashes, lesions or ulcers noted.               Ext:           No cyanosis or edema.      Assessment:  1. JOSSY (obstructive sleep apnea)  DME Mask and Supplies      2. Body mass index (BMI) of 45.0-49.9 in adult ([facility])        3. Severe obesity ([facility])            Plan:  Using and benefiting from CPAP therapy.  Compliance shows adequate use and control of JOSSY.  Order placed for mask and supplies through [employer].  Advised patient to follow-up in 1 year.  Compliant with CPAP and recommend continue current settings.  [facility] Gap Form    Diagnosis: E66.01 - Severe obesity (HCC)  Z68.42 - Body mass index (BMI) of 45.0-49.9 in adult ([facility])  The current BMI is 48.66 kg/m² as of 01/07/25.    Past BMI Values:  01/06/25 : 48.66 kg/m². 12/28/24 : 49.18 kg/m². 08/26/24 : 48.85 kg/m².   Assessment and plan: Chronic, stable. Encouraged healthy diet and physical activity changes with a goal of weight loss. Follow up at least annually.  Last edited 01/07/25 09:50 PST by Phuc Rain [email or web address].           Please note that this dictation was created using voice recognition software. I have made every reasonable attempt to correct obvious errors, but it is possible there are errors of grammar and possibly content that I  did not discover before finalizing the note.

## 2025-02-10 ENCOUNTER — OFFICE VISIT (OUTPATIENT)
Dept: SLEEP MEDICINE | Facility: MEDICAL CENTER | Age: 60
End: 2025-02-10
Attending: INTERNAL MEDICINE
Payer: COMMERCIAL

## 2025-02-10 VITALS
BODY MASS INDEX: 47.74 KG/M2 | OXYGEN SATURATION: 93 % | SYSTOLIC BLOOD PRESSURE: 124 MMHG | HEIGHT: 68 IN | DIASTOLIC BLOOD PRESSURE: 62 MMHG | HEART RATE: 92 BPM | WEIGHT: 315 LBS

## 2025-02-10 DIAGNOSIS — J45.20 MILD INTERMITTENT ASTHMA WITHOUT COMPLICATION: ICD-10-CM

## 2025-02-10 DIAGNOSIS — E11.8 CONTROLLED TYPE 2 DIABETES MELLITUS WITH COMPLICATION, WITHOUT LONG-TERM CURRENT USE OF INSULIN (HCC): ICD-10-CM

## 2025-02-10 DIAGNOSIS — I15.2 HYPERTENSION ASSOCIATED WITH TYPE 2 DIABETES MELLITUS (HCC): ICD-10-CM

## 2025-02-10 DIAGNOSIS — F51.01 PRIMARY INSOMNIA: ICD-10-CM

## 2025-02-10 DIAGNOSIS — E11.59 HYPERTENSION ASSOCIATED WITH TYPE 2 DIABETES MELLITUS (HCC): ICD-10-CM

## 2025-02-10 DIAGNOSIS — K21.9 GASTROESOPHAGEAL REFLUX DISEASE WITHOUT ESOPHAGITIS: ICD-10-CM

## 2025-02-10 DIAGNOSIS — G47.33 OSA (OBSTRUCTIVE SLEEP APNEA): ICD-10-CM

## 2025-02-10 PROCEDURE — 3074F SYST BP LT 130 MM HG: CPT | Performed by: INTERNAL MEDICINE

## 2025-02-10 PROCEDURE — 99213 OFFICE O/P EST LOW 20 MIN: CPT | Performed by: INTERNAL MEDICINE

## 2025-02-10 PROCEDURE — 3078F DIAST BP <80 MM HG: CPT | Performed by: INTERNAL MEDICINE

## 2025-02-10 PROCEDURE — 99214 OFFICE O/P EST MOD 30 MIN: CPT | Performed by: INTERNAL MEDICINE

## 2025-02-10 RX ORDER — FLUTICASONE PROPIONATE AND SALMETEROL 100; 50 UG/1; UG/1
1 POWDER RESPIRATORY (INHALATION) 2 TIMES DAILY
Qty: 3 EACH | Refills: 3 | Status: SHIPPED | OUTPATIENT
Start: 2025-02-10

## 2025-02-10 RX ORDER — METFORMIN HYDROCHLORIDE 500 MG/1
1000 TABLET, EXTENDED RELEASE ORAL DAILY
Qty: 180 TABLET | Refills: 3 | Status: SHIPPED | OUTPATIENT
Start: 2025-02-10

## 2025-02-10 RX ORDER — TIRZEPATIDE 10 MG/.5ML
10 INJECTION, SOLUTION SUBCUTANEOUS
Qty: 6 ML | Refills: 3 | Status: SHIPPED | OUTPATIENT
Start: 2025-02-10

## 2025-02-10 RX ORDER — ESOMEPRAZOLE MAGNESIUM 40 MG/1
40 CAPSULE, DELAYED RELEASE ORAL DAILY
Qty: 90 CAPSULE | Refills: 3 | Status: SHIPPED | OUTPATIENT
Start: 2025-02-10

## 2025-02-10 RX ORDER — ALBUTEROL SULFATE 90 UG/1
2 INHALANT RESPIRATORY (INHALATION) EVERY 4 HOURS PRN
Qty: 3 EACH | Refills: 3 | Status: SHIPPED | OUTPATIENT
Start: 2025-02-10

## 2025-02-10 RX ORDER — ZOLPIDEM TARTRATE 10 MG/1
10 TABLET ORAL NIGHTLY PRN
Qty: 90 EACH | Refills: 1 | Status: SHIPPED | OUTPATIENT
Start: 2025-02-10 | End: 2025-08-09

## 2025-02-10 RX ORDER — ATORVASTATIN CALCIUM 20 MG/1
20 TABLET, FILM COATED ORAL DAILY
Qty: 90 TABLET | Refills: 3 | Status: SHIPPED | OUTPATIENT
Start: 2025-02-10

## 2025-02-10 RX ORDER — MONTELUKAST SODIUM 10 MG/1
10 TABLET ORAL DAILY
Qty: 90 TABLET | Refills: 3 | Status: SHIPPED | OUTPATIENT
Start: 2025-02-10

## 2025-02-10 RX ORDER — AMLODIPINE BESYLATE 10 MG/1
10 TABLET ORAL DAILY
Qty: 90 TABLET | Refills: 3 | Status: SHIPPED | OUTPATIENT
Start: 2025-02-10

## 2025-02-10 RX ORDER — LOSARTAN POTASSIUM 100 MG/1
100 TABLET ORAL DAILY
Qty: 90 TABLET | Refills: 3 | Status: SHIPPED | OUTPATIENT
Start: 2025-02-10

## 2025-02-10 RX ORDER — FENOFIBRATE 145 MG/1
145 TABLET, COATED ORAL EVERY EVENING
Qty: 90 TABLET | Refills: 3 | Status: SHIPPED | OUTPATIENT
Start: 2025-02-10

## 2025-02-10 ASSESSMENT — ENCOUNTER SYMPTOMS
PALPITATIONS: 0
FEVER: 0
NAUSEA: 0
WEIGHT LOSS: 0
SHORTNESS OF BREATH: 0
DIAPHORESIS: 0
EYE REDNESS: 0
PND: 0
VOMITING: 0
DIARRHEA: 0
BLURRED VISION: 0
CLAUDICATION: 0
EYE PAIN: 0
TREMORS: 0
STRIDOR: 0
HEADACHES: 0
PHOTOPHOBIA: 0
FOCAL WEAKNESS: 0
ORTHOPNEA: 0
SPUTUM PRODUCTION: 0
SPEECH CHANGE: 0
EYE DISCHARGE: 0
DOUBLE VISION: 0
HEMOPTYSIS: 0
SORE THROAT: 0
SINUS PAIN: 0
HEARTBURN: 0
CONSTIPATION: 0
ABDOMINAL PAIN: 0
WHEEZING: 0
COUGH: 0
DIZZINESS: 0
NECK PAIN: 0
CHILLS: 0
MYALGIAS: 0
WEAKNESS: 0
BACK PAIN: 0
FALLS: 0
DEPRESSION: 0

## 2025-02-10 ASSESSMENT — FIBROSIS 4 INDEX: FIB4 SCORE: 1.98

## 2025-02-10 NOTE — PROGRESS NOTES
Chief Complaint   Patient presents with    Follow-Up     LAST SEEN 11/23/22 BY JACKI REID    Results     CTA CHEST 6/14/23         HPI: This patient is a 59 y.o. male whom is followed in our clinic for asthma last seen by APRN, Comfort Flores, on 11/23/22. I last saw the the pt 5/2022. The patient's past medical history is significant for asthma since childhood which was typically triggered by exercise and environmental allergies.  He reports spring and fall being typical times when symptoms would flare and he would often require treatment with antibiotics and steroids but typically only once per year. He is on advair 100 and prn ventolin. He is working on weight loss and has had some success on GLP-1. Full PFT from 12/2021 showed normal air flows, normal lung volumes and normal DLCO. He is followed by sleep medicine on CPAP. No exacerbations requiring steroids in over a year however he did have a mild case of covid in 11/2024. CTA chest from 6/14/23 showed clear lung fields.     Past Medical History:   Diagnosis Date    Allergy     ASTHMA     Back pain     BPH (benign prostatic hypertrophy)     Breast mass     right side between 11 and 12 o'clock at nipple    Cervical spine degeneration     Chest pain     Difficulty breathing     Earache     ED (erectile dysfunction)     High cholesterol     Hypercholesteremia     Hypertension     Obesity     Osteoarthritis     Prediabetes     Right arm pain     Ringing in ears     Sleep apnea     on CPAP    Sweat, sweating, excessive     Wears glasses        Social History     Socioeconomic History    Marital status:      Spouse name: Not on file    Number of children: Not on file    Years of education: Not on file    Highest education level: Not on file   Occupational History     Employer: OTHER     Comment: pt is adopted and does not know family history   Tobacco Use    Smoking status: Former     Current packs/day: 0.00     Average packs/day: 0.5 packs/day for 0.5  years (0.2 ttl pk-yrs)     Types: Cigarettes     Start date: 1981     Quit date: 1982     Years since quittin.0    Smokeless tobacco: Never   Vaping Use    Vaping status: Never Used   Substance and Sexual Activity    Alcohol use: Yes     Alcohol/week: 1.2 oz     Types: 2 Standard drinks or equivalent per week     Comment: occ    Drug use: No     Comment: hx of ephedirn for weight loss over 2 years ago    Sexual activity: Yes     Partners: Female   Other Topics Concern    Not on file   Social History Narrative    Not on file     Social Drivers of Health     Financial Resource Strain: Not on file   Food Insecurity: Not on file   Transportation Needs: Not on file   Physical Activity: Not on file   Stress: Not on file   Social Connections: Not on file   Intimate Partner Violence: Not on file   Housing Stability: Not on file       Family History   Problem Relation Age of Onset    Sleep Apnea Father     Sleep Apnea Sister        Current Outpatient Medications on File Prior to Visit   Medication Sig Dispense Refill    zolpidem (AMBIEN) 10 MG Tab Take 1 Tablet by mouth at bedtime as needed for Sleep for up to 180 days. Each prescription is a 90 day supply 90 Each 1    Levocetirizine Dihydrochloride 5 MG Tab Take 1 Tablet by mouth every day. 90 Tablet 3    Tirzepatide (MOUNJARO) 7.5 MG/0.5ML Solution Pen-injector Inject 7.5 mg under the skin every 7 days. 6 mL 1    tizanidine (ZANAFLEX) 4 MG Tab TAKE 1 TABLET AT BEDTIME AS NEEDED FOR PAIN 90 Tablet 3    atorvastatin (LIPITOR) 20 MG Tab TAKE 1 TABLET DAILY 90 Tablet 3    fenofibrate (TRICOR) 145 MG Tab Take 1 Tablet by mouth every evening. TRIGLYCERIDES 90 Tablet 3    amLODIPine (NORVASC) 10 MG Tab Take 1 Tablet by mouth every day. 90 Tablet 1    esomeprazole (NEXIUM) 40 MG delayed-release capsule Take 1 Capsule by mouth every day. 90 Capsule 3    losartan (COZAAR) 100 MG Tab Take 1 Tablet by mouth every day. 90 Tablet 3    montelukast (SINGULAIR) 10 MG Tab  Take 1 Tablet by mouth every day. 90 Tablet 3    metFORMIN ER (GLUCOPHAGE XR) 500 MG TABLET SR 24 HR Take 2 Tablets by mouth every day. 180 Tablet 3    doxycycline (VIBRAMYCIN) 100 MG Tab Take 1 tab by mouth twice daily for 10 days as needed for recurring cellulitis 80 Tablet 1    clotrimazole-betamethasone (LOTRISONE) 1-0.05 % Cream Apply to affected area up to 3 times daily as needed for rash. 45 g 1    albuterol (PROVENTIL) 2.5mg/3ml Nebu Soln solution for nebulization Take 3 mL by nebulization every four hours as needed for Shortness of Breath. 120 mL 11    cholestyramine (QUESTRAN) 4 g packet Take 4 g by mouth every day. 30 Each 0    hydrOXYzine HCl (ATARAX) 25 MG Tab Take 1 Tab by mouth 3 times a day as needed for Itching. 20 Tab 0    fexofenadine (ALLEGRA) 180 MG tablet Take 180 mg by mouth 1 time daily as needed.      predniSONE (DELTASONE) 20 MG Tab Take 2 tabs (40mg) daily x 3 days, then take 1 tab (20mg) daily x 3 days, then take 1/2 tab (10mg) daily x 4 days 11 Tablet 0    celecoxib (CELEBREX) 200 MG Cap Take 1 Capsule by mouth 2 times a day. 180 Capsule 1    triamcinolone acetonide (KENALOG) 0.5 % Cream Apply to affected  area up to 4 times daily as needed for rash/itching. Max use is 14 days in a row. 454 g 1     No current facility-administered medications on file prior to visit.       Patient has no known allergies.      ROS:   Review of Systems   Constitutional:  Negative for chills, diaphoresis, fever, malaise/fatigue and weight loss.   HENT:  Negative for congestion, ear discharge, ear pain, hearing loss, nosebleeds, sinus pain, sore throat and tinnitus.    Eyes:  Negative for blurred vision, double vision, photophobia, pain, discharge and redness.   Respiratory:  Negative for cough, hemoptysis, sputum production, shortness of breath, wheezing and stridor.    Cardiovascular:  Negative for chest pain, palpitations, orthopnea, claudication, leg swelling and PND.   Gastrointestinal:  Negative for  "abdominal pain, constipation, diarrhea, heartburn, nausea and vomiting.   Genitourinary:  Negative for dysuria and urgency.   Musculoskeletal:  Negative for back pain, falls, joint pain, myalgias and neck pain.   Skin:  Negative for itching and rash.   Neurological:  Negative for dizziness, tremors, speech change, focal weakness, weakness and headaches.   Endo/Heme/Allergies:  Negative for environmental allergies.   Psychiatric/Behavioral:  Negative for depression.        /62 (BP Location: Right arm, Patient Position: Sitting, BP Cuff Size: Adult)   Pulse 92   Ht 1.727 m (5' 8\")   Wt (!) 143 kg (316 lb)   SpO2 93%   Physical Exam  Vitals reviewed.   Constitutional:       General: He is not in acute distress.     Appearance: Normal appearance. He is obese.   HENT:      Head: Normocephalic and atraumatic.      Right Ear: External ear normal.      Left Ear: External ear normal.      Nose: Nose normal. No congestion.      Mouth/Throat:      Mouth: Mucous membranes are moist.      Pharynx: Oropharynx is clear. No oropharyngeal exudate.   Eyes:      General: No scleral icterus.     Extraocular Movements: Extraocular movements intact.      Conjunctiva/sclera: Conjunctivae normal.      Pupils: Pupils are equal, round, and reactive to light.   Cardiovascular:      Rate and Rhythm: Normal rate and regular rhythm.      Heart sounds: Normal heart sounds. No murmur heard.     No gallop.   Pulmonary:      Effort: Pulmonary effort is normal. No respiratory distress.      Breath sounds: Normal breath sounds. No wheezing or rales.   Abdominal:      Palpations: Abdomen is soft.   Musculoskeletal:         General: Normal range of motion.      Cervical back: Normal range of motion and neck supple.      Right lower leg: No edema.      Left lower leg: No edema.   Skin:     General: Skin is warm and dry.      Findings: No rash.   Neurological:      General: No focal deficit present.      Mental Status: He is alert and oriented " to person, place, and time.      Cranial Nerves: No cranial nerve deficit.   Psychiatric:         Mood and Affect: Mood normal.         Behavior: Behavior normal.       PFTs as reviewed by me personally: as per HPI    Imaging as reviewed by me personally:  as per hPI    Assessment:  1. Mild intermittent asthma without complication  fluticasone-salmeterol (ADVAIR DISKUS) 100-50 MCG/ACT AEROSOL POWDER, BREATH ACTIVATED    albuterol (VENTOLIN HFA) 108 (90 Base) MCG/ACT Aero Soln inhalation aerosol    Spirometry      2. JOSSY (obstructive sleep apnea)            Plan:  Chronic, mild, stable with good sxs control on low dose advair. No exacerbations. Continue this regimen with prn TRACEY and update spirometry in one year.  Chronic. Followed by sleep medicine. Compliant with therapy.  Return in about 1 year (around 2/10/2026) for spirometry at time of f/u.

## 2025-02-12 NOTE — Clinical Note
REFERRAL APPROVAL NOTICE         Sent on February 12, 2025                   French Hurd  [address] [address]                   Dear Mr. Hurd,    After a careful review of the medical information and benefit coverage, [facility] has processed your referral. See below for additional details.    If applicable, you must be actively enrolled with your insurance for coverage of the authorized service. If you have any questions regarding your coverage, please contact your insurance directly.    REFERRAL INFORMATION   Referral #:  [id number]  Referred-To Department    Referred-By Provider:  Pulmonary and Sleep Medicine    Nadiya Marques M.D.   Pulmonary/sleep [facility]      [address]  [place] [address]  [phone] [address] [place] [address] [address]  [place] [address] [phone]    Referral Start Date:  02/10/2025  Referral End Date:   02/10/2026           SCHEDULING  If you do not already have an appointment, please call [phone] to make an appointment.   MORE INFORMATION  As a reminder, [facility] ownership has changed, meaning this location is now owned and operated by [facility]. As such, we want to clarify that our patients should expect to receive two separate bills for the services received at [facility] - one representing the [facility] facility fees as the owner of the establishment, and the other to represent the physician's services and subsequent fees. You can speak with your insurance carrier for a pricing estimate by calling the customer service number on the back of your card and ask about charges for a hospital outpatient visit.  If you do not already have a [facility] account, sign up at: [email or web address]  You can access your medical information, make appointments, see lab results, billing  information, and more.  If you have questions regarding this referral, please contact  the [facility] department at:             [phone]. Monday - Friday 7:30AM - 5:00PM.      Sincerely,  [facility]

## 2025-02-15 ENCOUNTER — PATIENT MESSAGE (OUTPATIENT)
Dept: SLEEP MEDICINE | Facility: MEDICAL CENTER | Age: 60
End: 2025-02-15
Payer: COMMERCIAL

## 2025-02-15 DIAGNOSIS — J45.20 MILD INTERMITTENT ASTHMA WITHOUT COMPLICATION: ICD-10-CM

## 2025-02-28 RX ORDER — FLUTICASONE PROPIONATE AND SALMETEROL 100; 50 UG/1; UG/1
1 POWDER RESPIRATORY (INHALATION) EVERY 12 HOURS
Qty: 3 EACH | Refills: 3 | Status: SHIPPED | OUTPATIENT
Start: 2025-02-28

## 2025-03-11 ENCOUNTER — OFFICE VISIT (OUTPATIENT)
Dept: MEDICAL GROUP | Facility: PHYSICIAN GROUP | Age: 60
End: 2025-03-11
Payer: COMMERCIAL

## 2025-03-11 VITALS
BODY MASS INDEX: 47.74 KG/M2 | TEMPERATURE: 98.2 F | DIASTOLIC BLOOD PRESSURE: 76 MMHG | HEART RATE: 78 BPM | WEIGHT: 315 LBS | OXYGEN SATURATION: 96 % | SYSTOLIC BLOOD PRESSURE: 132 MMHG | RESPIRATION RATE: 22 BRPM | HEIGHT: 68 IN

## 2025-03-11 DIAGNOSIS — Z23 NEED FOR VACCINATION: ICD-10-CM

## 2025-03-11 DIAGNOSIS — E11.59 HYPERTENSION ASSOCIATED WITH TYPE 2 DIABETES MELLITUS (HCC): ICD-10-CM

## 2025-03-11 DIAGNOSIS — E11.69 DYSLIPIDEMIA DUE TO TYPE 2 DIABETES MELLITUS (HCC): ICD-10-CM

## 2025-03-11 DIAGNOSIS — M79.641 PAIN IN BOTH HANDS: ICD-10-CM

## 2025-03-11 DIAGNOSIS — M79.89 LEFT LEG SWELLING: ICD-10-CM

## 2025-03-11 DIAGNOSIS — J45.20 MILD INTERMITTENT ASTHMA WITHOUT COMPLICATION: ICD-10-CM

## 2025-03-11 DIAGNOSIS — Z12.5 ENCOUNTER FOR SCREENING FOR MALIGNANT NEOPLASM OF PROSTATE: ICD-10-CM

## 2025-03-11 DIAGNOSIS — L03.119 RECURRENT CELLULITIS OF LOWER EXTREMITY: ICD-10-CM

## 2025-03-11 DIAGNOSIS — R07.89 OTHER CHEST PAIN: ICD-10-CM

## 2025-03-11 DIAGNOSIS — I15.2 HYPERTENSION ASSOCIATED WITH TYPE 2 DIABETES MELLITUS (HCC): ICD-10-CM

## 2025-03-11 DIAGNOSIS — M79.642 PAIN IN BOTH HANDS: ICD-10-CM

## 2025-03-11 DIAGNOSIS — E78.5 DYSLIPIDEMIA DUE TO TYPE 2 DIABETES MELLITUS (HCC): ICD-10-CM

## 2025-03-11 DIAGNOSIS — E55.9 VITAMIN D DEFICIENCY: ICD-10-CM

## 2025-03-11 DIAGNOSIS — F51.01 PRIMARY INSOMNIA: ICD-10-CM

## 2025-03-11 DIAGNOSIS — R19.5 LOOSE STOOLS: ICD-10-CM

## 2025-03-11 DIAGNOSIS — L30.9 DERMATITIS: ICD-10-CM

## 2025-03-11 DIAGNOSIS — E11.8 CONTROLLED TYPE 2 DIABETES MELLITUS WITH COMPLICATION, WITHOUT LONG-TERM CURRENT USE OF INSULIN (HCC): ICD-10-CM

## 2025-03-11 LAB
HBA1C MFR BLD: 5.7 % (ref ?–5.8)
POCT INT CON NEG: NEGATIVE
POCT INT CON POS: POSITIVE

## 2025-03-11 PROCEDURE — 99214 OFFICE O/P EST MOD 30 MIN: CPT | Performed by: PHYSICIAN ASSISTANT

## 2025-03-11 PROCEDURE — 3078F DIAST BP <80 MM HG: CPT | Performed by: PHYSICIAN ASSISTANT

## 2025-03-11 PROCEDURE — 83036 HEMOGLOBIN GLYCOSYLATED A1C: CPT | Performed by: PHYSICIAN ASSISTANT

## 2025-03-11 PROCEDURE — 3075F SYST BP GE 130 - 139MM HG: CPT | Performed by: PHYSICIAN ASSISTANT

## 2025-03-11 RX ORDER — TIRZEPATIDE 12.5 MG/.5ML
12.5 INJECTION, SOLUTION SUBCUTANEOUS
Qty: 6 ML | Refills: 3 | Status: SHIPPED | OUTPATIENT
Start: 2025-03-11

## 2025-03-11 RX ORDER — PREDNISONE 20 MG/1
60 TABLET ORAL DAILY
Qty: 60 TABLET | Refills: 2 | Status: SHIPPED | OUTPATIENT
Start: 2025-03-11 | End: 2025-03-16

## 2025-03-11 SDOH — HEALTH STABILITY: PHYSICAL HEALTH: ON AVERAGE, HOW MANY MINUTES DO YOU ENGAGE IN EXERCISE AT THIS LEVEL?: 20 MIN

## 2025-03-11 SDOH — ECONOMIC STABILITY: FOOD INSECURITY: WITHIN THE PAST 12 MONTHS, THE FOOD YOU BOUGHT JUST DIDN'T LAST AND YOU DIDN'T HAVE MONEY TO GET MORE.: NEVER TRUE

## 2025-03-11 SDOH — ECONOMIC STABILITY: HOUSING INSECURITY
IN THE LAST 12 MONTHS, WAS THERE A TIME WHEN YOU DID NOT HAVE A STEADY PLACE TO SLEEP OR SLEPT IN A SHELTER (INCLUDING NOW)?: NO

## 2025-03-11 SDOH — ECONOMIC STABILITY: INCOME INSECURITY: IN THE LAST 12 MONTHS, WAS THERE A TIME WHEN YOU WERE NOT ABLE TO PAY THE MORTGAGE OR RENT ON TIME?: NO

## 2025-03-11 SDOH — ECONOMIC STABILITY: INCOME INSECURITY: HOW HARD IS IT FOR YOU TO PAY FOR THE VERY BASICS LIKE FOOD, HOUSING, MEDICAL CARE, AND HEATING?: NOT VERY HARD

## 2025-03-11 SDOH — HEALTH STABILITY: PHYSICAL HEALTH: ON AVERAGE, HOW MANY DAYS PER WEEK DO YOU ENGAGE IN MODERATE TO STRENUOUS EXERCISE (LIKE A BRISK WALK)?: 3 DAYS

## 2025-03-11 SDOH — ECONOMIC STABILITY: TRANSPORTATION INSECURITY
IN THE PAST 12 MONTHS, HAS LACK OF RELIABLE TRANSPORTATION KEPT YOU FROM MEDICAL APPOINTMENTS, MEETINGS, WORK OR FROM GETTING THINGS NEEDED FOR DAILY LIVING?: NO

## 2025-03-11 SDOH — ECONOMIC STABILITY: FOOD INSECURITY: WITHIN THE PAST 12 MONTHS, YOU WORRIED THAT YOUR FOOD WOULD RUN OUT BEFORE YOU GOT MONEY TO BUY MORE.: NEVER TRUE

## 2025-03-11 SDOH — ECONOMIC STABILITY: TRANSPORTATION INSECURITY
IN THE PAST 12 MONTHS, HAS LACK OF TRANSPORTATION KEPT YOU FROM MEETINGS, WORK, OR FROM GETTING THINGS NEEDED FOR DAILY LIVING?: NO

## 2025-03-11 SDOH — HEALTH STABILITY: MENTAL HEALTH
STRESS IS WHEN SOMEONE FEELS TENSE, NERVOUS, ANXIOUS, OR CAN'T SLEEP AT NIGHT BECAUSE THEIR MIND IS TROUBLED. HOW STRESSED ARE YOU?: RATHER MUCH

## 2025-03-11 SDOH — ECONOMIC STABILITY: TRANSPORTATION INSECURITY
IN THE PAST 12 MONTHS, HAS THE LACK OF TRANSPORTATION KEPT YOU FROM MEDICAL APPOINTMENTS OR FROM GETTING MEDICATIONS?: NO

## 2025-03-11 ASSESSMENT — SOCIAL DETERMINANTS OF HEALTH (SDOH)
HOW OFTEN DO YOU HAVE A DRINK CONTAINING ALCOHOL: 2-3 TIMES A WEEK
HOW OFTEN DO YOU GET TOGETHER WITH FRIENDS OR RELATIVES?: TWICE A WEEK
HOW OFTEN DO YOU HAVE SIX OR MORE DRINKS ON ONE OCCASION: NEVER
HOW MANY DRINKS CONTAINING ALCOHOL DO YOU HAVE ON A TYPICAL DAY WHEN YOU ARE DRINKING: 1 OR 2
HOW OFTEN DO YOU ATTENT MEETINGS OF THE CLUB OR ORGANIZATION YOU BELONG TO?: 1 TO 4 TIMES PER YEAR
HOW HARD IS IT FOR YOU TO PAY FOR THE VERY BASICS LIKE FOOD, HOUSING, MEDICAL CARE, AND HEATING?: NOT VERY HARD
HOW OFTEN DO YOU GET TOGETHER WITH FRIENDS OR RELATIVES?: TWICE A WEEK
WITHIN THE PAST 12 MONTHS, YOU WORRIED THAT YOUR FOOD WOULD RUN OUT BEFORE YOU GOT THE MONEY TO BUY MORE: NEVER TRUE
HOW OFTEN DO YOU ATTENT MEETINGS OF THE CLUB OR ORGANIZATION YOU BELONG TO?: 1 TO 4 TIMES PER YEAR
DO YOU BELONG TO ANY CLUBS OR ORGANIZATIONS SUCH AS CHURCH GROUPS UNIONS, FRATERNAL OR ATHLETIC GROUPS, OR SCHOOL GROUPS?: YES
IN THE PAST 12 MONTHS, HAS THE ELECTRIC, GAS, OIL, OR WATER COMPANY THREATENED TO SHUT OFF SERVICE IN YOUR HOME?: NO
IN A TYPICAL WEEK, HOW MANY TIMES DO YOU TALK ON THE PHONE WITH FAMILY, FRIENDS, OR NEIGHBORS?: TWICE A WEEK
HOW OFTEN DO YOU ATTEND CHURCH OR RELIGIOUS SERVICES?: NEVER
HOW OFTEN DO YOU ATTEND CHURCH OR RELIGIOUS SERVICES?: NEVER
IN A TYPICAL WEEK, HOW MANY TIMES DO YOU TALK ON THE PHONE WITH FAMILY, FRIENDS, OR NEIGHBORS?: TWICE A WEEK
DO YOU BELONG TO ANY CLUBS OR ORGANIZATIONS SUCH AS CHURCH GROUPS UNIONS, FRATERNAL OR ATHLETIC GROUPS, OR SCHOOL GROUPS?: YES

## 2025-03-11 ASSESSMENT — ENCOUNTER SYMPTOMS
FEVER: 0
CHILLS: 0
SHORTNESS OF BREATH: 0

## 2025-03-11 ASSESSMENT — LIFESTYLE VARIABLES
HOW MANY STANDARD DRINKS CONTAINING ALCOHOL DO YOU HAVE ON A TYPICAL DAY: 1 OR 2
SKIP TO QUESTIONS 9-10: 1
AUDIT-C TOTAL SCORE: 3
HOW OFTEN DO YOU HAVE A DRINK CONTAINING ALCOHOL: 2-3 TIMES A WEEK
HOW OFTEN DO YOU HAVE SIX OR MORE DRINKS ON ONE OCCASION: NEVER

## 2025-03-11 ASSESSMENT — FIBROSIS 4 INDEX: FIB4 SCORE: 1.98

## 2025-03-11 NOTE — ASSESSMENT & PLAN NOTE
Chronic, stable.  Tolerating Advair 100/50 when he remembers to take it.  Has been having some breathing issues over the last few weeks.  Encouraged him to take Advair daily as this will likely improve his breathing.  Followed by pulmonology.

## 2025-03-11 NOTE — ASSESSMENT & PLAN NOTE
Chronic, intermittent.  Mostly controlled.  Too hot to wear compression stockings.  Declines diuretics.   Denies orthopnea and PND.  Does have intermittently chronic shortness of breath but has significant asthma.  Patient has issues with chronically intermittent cellulitis of left lower extremity that frequently presents in the same manner.  Patient has doxycycline on hand in case this happens, as it happens frequently.

## 2025-03-11 NOTE — ASSESSMENT & PLAN NOTE
Chronic, controlled.   Tizanidine does help with the chest pain.    Interval history 4/2024:  Chronic, uncontrolled.  Started about a year ago, around February 2023.    Continues to have intermittent issues.  At rest when he noticed it.  NO PAIN WITH EXERTION.  Feels better when he stretches.  Denies orthopnea, PND.  Intermittent SOB - history of known asthma.  Tizanidine DOES help the chest pain.    CT cardiac score 2019: Total Calcium Score: 13.4     Nuc med stress test 2019:

## 2025-03-11 NOTE — ASSESSMENT & PLAN NOTE
Chronic, stable.  Ambien 10 mg with tizanidine seems to help him sleep the best.    Intermittent history:  Lorazepam 1 mg was working better but no longer.  Going back to Ambien 10mg.  Employer regulations won't allow Ambien CR.  Still waking in the middle of the night.  Trial (on a day off) adding alprazolam 0.25mg for middle of the night awakenings.    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

## 2025-03-11 NOTE — ASSESSMENT & PLAN NOTE
Chronic, stable.     Current Diabetes Medication Regimen  Discontinue metformin  Increase Mounjaro to 12.5mg every 7 days    Previous Diabetes Medications and Reason for Discontinuation  GLP1-RA: Trulicity caused loose stool; Ozempic seemed to worsen stools.    Last A1c: 5.7% (3/2025); 6.5 (4/2024); 5.8% (6/2023) ; 6.1% (11/30/2022); 6.3% (2/2022)  Last Microalb/Cr ratio: 88 (4/2024); 3.7 (11/2022)  Last diabetic foot exam: 3/2025  Last retinal eye exam: 8/2024 (appointment April 2025)  ACEi/ARB:  Losartan 100 mg.  Statin:  Atorvastatin 80 mg  Aspirin:  Advised 81 mg daily  Concomitant HTN:  Yes  Nightly foot checks:  No

## 2025-03-11 NOTE — ASSESSMENT & PLAN NOTE
Chronic, intermittent.  Most recent flare was February 2025.  LLE.  Mostly controlled.    Too hot to wear compression stockings.  Declines diuretics.  Denies orthopnea and PND.  Does have intermittently chronic shortness of breath but has significant asthma.  Patient has issues with chronically intermittent cellulitis of left lower extremity that frequently presents in the same manner.  Patient has doxycycline and prednisone (60mg daily for 5 days) on hand in case this happens, as it happens frequently.

## 2025-03-11 NOTE — ASSESSMENT & PLAN NOTE
Chronic, intermittent.  Patient notices pain primarily on the right index finger, DIP, since around early 2025.  Denies injury.

## 2025-03-11 NOTE — PROGRESS NOTES
SUBJECTIVE:     CC: follow up chronic issues    HPI:   French presents today with the following:    ASSESSMENT & PLAN by Problem:     Problem   Pain in Both Hands    Chronic, intermittent.  Hand x-ray/survey to rule out erosive arthritis.  Discussed using topical anti-inflammatories given his line of work.     Recurrent Cellulitis of Lower Extremity    Chronic, intermittent.  LLE.  Mostly controlled.    Currently asymptomatic.     Other Chest Pain    Chronic, uncontrolled.  Started about a year ago, around February 2023.    Continues to have intermittent issues.  At rest when he noticed it.  NO PAIN WITH EXERTION.  Feels better when he stretches.  Denies orthopnea, PND.  Intermittent SOB - history of known asthma.  Tizanidine DOES help the chest pain.    CT cardiac score 2019: Total Calcium Score: 13.4     Nuc med stress test 2019:          Left Leg Swelling    Chronic, intermittent.  Currently controlled.     Dyslipidemia Due to Type 2 Diabetes Mellitus (Hcc)    Chronic, uncontrolled.   LDL, mildly elevated.  Triglycerides improved since restarting fenofibrate.  Tolerating/continue fenofibrate 145 mg and atorvastatin 20mg.     Controlled Type 2 Diabetes Mellitus With Complication, Without Long-Term Current Use of Insulin (Hcc)    Chronic, stable.     Current Diabetes Medication Regimen  Discontinue metformin  Increase Mounjaro to 12.5mg every 7 days     Mild Intermittent Asthma Without Complication    Chronic, stable.  Tolerating Advair 100/50 when he remembers to take it.  Followed by pulmonology.          Bmi 45.0-49.9, Adult (Hcc)    Chronic, uncontrolled but improving.  Has been working on weight loss.  Tolerating Mounjaro 10 mg every 7 days.     Insomnia    Chronic, stable.  Ambien 10 mg with tizanidine seems to help him sleep the best.     Hypertension Associated With Type 2 Diabetes Mellitus (Hcc)    Chronic, controlled.   Tolerating/continue losartan 100 mg and amlodipine to 10 mgdaily.     Loose Stools  (Resolved)    Resolved        POC A1c: 5.7%    Discontinue metformin.  Increase Mounjaro to 12.5mg every 7 days.  Hoping this will help with additional weight loss.    Monofilament: 3/2025    Influenza: declines    Repeat labs July 2025.         Healthcare Maintenance:      HPI:     Problem List Items Addressed This Visit       BMI 45.0-49.9, adult (HCC)    Chronic, uncontrolled but improving.  Has been working on weight loss.  Tolerating Mounjaro 10 mg every 7 days.    8/2023: 324#  1/2024: 326#  4/2024: 323#  8/2024: 321#  3/2025:          Relevant Medications    Tirzepatide (MOUNJARO) 12.5 MG/0.5ML Solution Auto-injector    Other Relevant Orders    CBC WITH DIFFERENTIAL    Comp Metabolic Panel    TSH WITH REFLEX TO FT4    Controlled type 2 diabetes mellitus with complication, without long-term current use of insulin (HCC)    Chronic, stable.     Current Diabetes Medication Regimen  Discontinue metformin  Increase Mounjaro to 12.5mg every 7 days    Previous Diabetes Medications and Reason for Discontinuation  GLP1-RA: Trulicity caused loose stool; Ozempic seemed to worsen stools.    Last A1c: 5.7% (3/2025); 6.5 (4/2024); 5.8% (6/2023) ; 6.1% (11/30/2022); 6.3% (2/2022)  Last Microalb/Cr ratio: 88 (4/2024); 3.7 (11/2022)  Last diabetic foot exam: 3/2025  Last retinal eye exam: 8/2024 (appointment April 2025)  ACEi/ARB:  Losartan 100 mg.  Statin:  Atorvastatin 80 mg  Aspirin:  Advised 81 mg daily  Concomitant HTN:  Yes  Nightly foot checks:  No         Relevant Medications    Tirzepatide (MOUNJARO) 12.5 MG/0.5ML Solution Auto-injector    Other Relevant Orders    Diabetic Monofilament LE Exam (Completed)    POCT  A1C (Completed)    MICROALBUMIN CREAT RATIO URINE    HEMOGLOBIN A1C    Dyslipidemia due to type 2 diabetes mellitus (HCC)    Chronic, uncontrolled.   LDL, mildly elevated.  Triglycerides improved since restarting fenofibrate.    Latest Labs:   Lab Results   Component Value Date/Time    CHOLSTRLTOT 169  08/21/2024 10:29 AM     (H) 08/21/2024 10:29 AM    HDL 37 (A) 08/21/2024 10:29 AM    TRIGLYCERIDE 126 08/21/2024 10:29 AM      Medications: Tolerating/continue fenofibrate 145 mg and atorvastatin 20mg.    Medication side effects: None  Diet/Exercise: No exercise currently due to a recently fractured hand; tries to make good food choices.  Risk calculator: The 10-year ASCVD risk score (Jojo ROJAS, et al., 2019) is: 15.9%          Relevant Medications    Tirzepatide (MOUNJARO) 12.5 MG/0.5ML Solution Auto-injector    Other Relevant Orders    Lipid Profile    Hypertension associated with type 2 diabetes mellitus (HCC)    Chronic, controlled.   Tolerating/continue losartan 100 mg and amlodipine to 10 mgdaily.         Relevant Medications    Tirzepatide (MOUNJARO) 12.5 MG/0.5ML Solution Auto-injector    Insomnia    Chronic, stable.  Ambien 10 mg with tizanidine seems to help him sleep the best.    Intermittent history:  Lorazepam 1 mg was working better but no longer.  Going back to Ambien 10mg.  Employer regulations won't allow Ambien CR.  Still waking in the middle of the night.  Trial (on a day off) adding alprazolam 0.25mg for middle of the night awakenings.    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         Left leg swelling    Chronic, intermittent.  Mostly controlled.  Too hot to wear compression stockings.  Declines diuretics.   Denies orthopnea and PND.  Does have intermittently chronic shortness of breath but has significant asthma.  Patient has issues with chronically intermittent cellulitis of left lower extremity that frequently presents in the same manner.  Patient has doxycycline on hand in case this happens, as it happens frequently.           RESOLVED: Loose stools    Mild intermittent asthma without complication    Chronic, stable.  Tolerating Advair 100/50 when he remembers to take it.  Has been having some breathing  issues over the last few weeks.  Encouraged him to take Advair daily as this will likely improve his breathing.  Followed by pulmonology.         Other chest pain    Chronic, controlled.   Tizanidine does help with the chest pain.    Interval history 4/2024:  Chronic, uncontrolled.  Started about a year ago, around February 2023.    Continues to have intermittent issues.  At rest when he noticed it.  NO PAIN WITH EXERTION.  Feels better when he stretches.  Denies orthopnea, PND.  Intermittent SOB - history of known asthma.  Tizanidine DOES help the chest pain.    CT cardiac score 2019: Total Calcium Score: 13.4     Nuc med stress test 2019:                Pain in both hands    Chronic, intermittent.  Patient notices pain primarily on the right index finger, DIP, since around early 2025.  Denies injury.         Relevant Orders    DX-JOINT SURVEY-HANDS SINGLE VIEW    Recurrent cellulitis of lower extremity    Chronic, intermittent.  Most recent flare was February 2025.  LLE.  Mostly controlled.    Too hot to wear compression stockings.  Declines diuretics.  Denies orthopnea and PND.  Does have intermittently chronic shortness of breath but has significant asthma.  Patient has issues with chronically intermittent cellulitis of left lower extremity that frequently presents in the same manner.  Patient has doxycycline and prednisone (60mg daily for 5 days) on hand in case this happens, as it happens frequently.         Relevant Medications    predniSONE (DELTASONE) 20 MG Tab    Vitamin D deficiency    Relevant Orders    VITAMIN D,25 HYDROXY (DEFICIENCY)     Other Visit Diagnoses         Need for vaccination          Encounter for screening for malignant neoplasm of prostate        Relevant Orders    PROSTATE SPECIFIC AG SCREENING      Dermatitis                       ROS:  Review of Systems   Constitutional:  Negative for chills and fever.   Respiratory:  Negative for shortness of breath.    Cardiovascular:  Negative for  "chest pain.       OBJECTIVE:     Exam:  /76   Pulse 78   Temp 36.8 °C (98.2 °F) (Temporal)   Resp (!) 22   Ht 1.727 m (5' 8\")   Wt (!) 144 kg (317 lb 12.8 oz)   SpO2 96%   BMI 48.32 kg/m²  Body mass index is 48.32 kg/m².    Physical Exam  Vitals reviewed.   Constitutional:       General: He is not in acute distress.     Appearance: Normal appearance.   Pulmonary:      Effort: Pulmonary effort is normal.   Musculoskeletal:      Comments: Nodules noted medial and lateral aspect of the DIP, both hands, on digits 2, 3, and 5. The only digit that is tender is the right second DIP   Neurological:      General: No focal deficit present.      Mental Status: He is alert.   Psychiatric:         Mood and Affect: Mood normal.         Behavior: Behavior normal.         Judgment: Judgment normal.     Monofilament testing with a 10 gram force: sensation intact: intact bilaterally  Visual Inspection: Feet without maceration, ulcers, fissures.  Pedal pulses: intact bilaterally        CHART REVIEW:     Labs:                       Please note that this dictation was created using voice recognition software. I have made every reasonable attempt to correct obvious errors, but I expect that there are errors of grammar and possibly content that I did not discover before finalizing the note.    "

## 2025-03-11 NOTE — ASSESSMENT & PLAN NOTE
Chronic, uncontrolled but improving.  Has been working on weight loss.  Tolerating Mounjaro 10 mg every 7 days.    8/2023: 324#  1/2024: 326#  4/2024: 323#  8/2024: 321#  3/2025:

## 2025-03-30 ENCOUNTER — HOSPITAL ENCOUNTER (OUTPATIENT)
Dept: RADIOLOGY | Facility: MEDICAL CENTER | Age: 60
End: 2025-03-30
Attending: PHYSICIAN ASSISTANT
Payer: COMMERCIAL

## 2025-03-30 DIAGNOSIS — M79.642 PAIN IN BOTH HANDS: ICD-10-CM

## 2025-03-30 DIAGNOSIS — M79.641 PAIN IN BOTH HANDS: ICD-10-CM

## 2025-03-30 PROCEDURE — 77077 JOINT SURVEY SINGLE VIEW: CPT

## 2025-04-01 ENCOUNTER — RESULTS FOLLOW-UP (OUTPATIENT)
Dept: MEDICAL GROUP | Facility: PHYSICIAN GROUP | Age: 60
End: 2025-04-01

## 2025-05-02 DIAGNOSIS — M47.812 OSTEOARTHRITIS OF CERVICAL SPINE, UNSPECIFIED SPINAL OSTEOARTHRITIS COMPLICATION STATUS: ICD-10-CM

## 2025-05-02 NOTE — TELEPHONE ENCOUNTER
Received request via: Pharmacy    Was the patient seen in the last year in this department? Yes    Does the patient have an active prescription (recently filled or refills available) for medication(s) requested? No    Pharmacy Name: [email or web address]    Does the patient have [facility] Plus and need 100-day supply? (This applies to ALL medications) Patient does not have SCP

## 2025-07-19 DIAGNOSIS — F51.01 PRIMARY INSOMNIA: ICD-10-CM

## 2025-07-21 RX ORDER — ZOLPIDEM TARTRATE 10 MG/1
10 TABLET ORAL
Qty: 90 TABLET | Refills: 1 | Status: SHIPPED | OUTPATIENT
Start: 2025-07-21 | End: 2026-01-17

## 2025-08-19 ENCOUNTER — HOSPITAL ENCOUNTER (OUTPATIENT)
Dept: LAB | Facility: MEDICAL CENTER | Age: 60
End: 2025-08-19
Attending: PHYSICIAN ASSISTANT
Payer: COMMERCIAL

## 2025-08-19 DIAGNOSIS — E55.9 VITAMIN D DEFICIENCY: ICD-10-CM

## 2025-08-19 DIAGNOSIS — E11.8 CONTROLLED TYPE 2 DIABETES MELLITUS WITH COMPLICATION, WITHOUT LONG-TERM CURRENT USE OF INSULIN (HCC): ICD-10-CM

## 2025-08-19 DIAGNOSIS — E11.69 DYSLIPIDEMIA DUE TO TYPE 2 DIABETES MELLITUS (HCC): ICD-10-CM

## 2025-08-19 DIAGNOSIS — Z12.5 ENCOUNTER FOR SCREENING FOR MALIGNANT NEOPLASM OF PROSTATE: ICD-10-CM

## 2025-08-19 DIAGNOSIS — E78.5 DYSLIPIDEMIA DUE TO TYPE 2 DIABETES MELLITUS (HCC): ICD-10-CM

## 2025-08-19 LAB
25(OH)D3 SERPL-MCNC: 34 NG/ML (ref 30–100)
ALBUMIN SERPL BCP-MCNC: 4.1 G/DL (ref 3.2–4.9)
ALBUMIN/GLOB SERPL: 1.4 G/DL
ALP SERPL-CCNC: 66 U/L (ref 30–99)
ALT SERPL-CCNC: 38 U/L (ref 2–50)
ANION GAP SERPL CALC-SCNC: 13 MMOL/L (ref 7–16)
AST SERPL-CCNC: 33 U/L (ref 12–45)
BASOPHILS # BLD AUTO: 1.1 % (ref 0–1.8)
BASOPHILS # BLD: 0.06 K/UL (ref 0–0.12)
BILIRUB SERPL-MCNC: 0.4 MG/DL (ref 0.1–1.5)
BUN SERPL-MCNC: 18 MG/DL (ref 8–22)
CALCIUM ALBUM COR SERPL-MCNC: 9.2 MG/DL (ref 8.5–10.5)
CALCIUM SERPL-MCNC: 9.3 MG/DL (ref 8.5–10.5)
CHLORIDE SERPL-SCNC: 106 MMOL/L (ref 96–112)
CHOLEST SERPL-MCNC: 147 MG/DL (ref 100–199)
CO2 SERPL-SCNC: 21 MMOL/L (ref 20–33)
CREAT SERPL-MCNC: 1.31 MG/DL (ref 0.5–1.4)
EOSINOPHIL # BLD AUTO: 0.11 K/UL (ref 0–0.51)
EOSINOPHIL NFR BLD: 2 % (ref 0–6.9)
ERYTHROCYTE [DISTWIDTH] IN BLOOD BY AUTOMATED COUNT: 43.2 FL (ref 35.9–50)
EST. AVERAGE GLUCOSE BLD GHB EST-MCNC: 111 MG/DL
FASTING STATUS PATIENT QL REPORTED: NORMAL
GFR SERPLBLD CREATININE-BSD FMLA CKD-EPI: 63 ML/MIN/1.73 M 2
GLOBULIN SER CALC-MCNC: 3 G/DL (ref 1.9–3.5)
GLUCOSE SERPL-MCNC: 109 MG/DL (ref 65–99)
HBA1C MFR BLD: 5.5 % (ref 4–5.6)
HCT VFR BLD AUTO: 45.2 % (ref 42–52)
HDLC SERPL-MCNC: 32 MG/DL
HGB BLD-MCNC: 15.8 G/DL (ref 14–18)
IMM GRANULOCYTES # BLD AUTO: 0.03 K/UL (ref 0–0.11)
IMM GRANULOCYTES NFR BLD AUTO: 0.5 % (ref 0–0.9)
LDLC SERPL CALC-MCNC: 73 MG/DL
LYMPHOCYTES # BLD AUTO: 1.7 K/UL (ref 1–4.8)
LYMPHOCYTES NFR BLD: 30.5 % (ref 22–41)
MCH RBC QN AUTO: 31.4 PG (ref 27–33)
MCHC RBC AUTO-ENTMCNC: 35 G/DL (ref 32.3–36.5)
MCV RBC AUTO: 89.9 FL (ref 81.4–97.8)
MONOCYTES # BLD AUTO: 0.7 K/UL (ref 0–0.85)
MONOCYTES NFR BLD AUTO: 12.5 % (ref 0–13.4)
NEUTROPHILS # BLD AUTO: 2.98 K/UL (ref 1.82–7.42)
NEUTROPHILS NFR BLD: 53.4 % (ref 44–72)
NRBC # BLD AUTO: 0 K/UL
NRBC BLD-RTO: 0 /100 WBC (ref 0–0.2)
PLATELET # BLD AUTO: 231 K/UL (ref 164–446)
PMV BLD AUTO: 11.7 FL (ref 9–12.9)
POTASSIUM SERPL-SCNC: 4.2 MMOL/L (ref 3.6–5.5)
PROT SERPL-MCNC: 7.1 G/DL (ref 6–8.2)
PSA SERPL DL<=0.01 NG/ML-MCNC: 0.83 NG/ML (ref 0–4)
RBC # BLD AUTO: 5.03 M/UL (ref 4.7–6.1)
SODIUM SERPL-SCNC: 140 MMOL/L (ref 135–145)
TRIGL SERPL-MCNC: 212 MG/DL (ref 0–149)
TSH SERPL DL<=0.005 MIU/L-ACNC: 1.4 UIU/ML (ref 0.38–5.33)
WBC # BLD AUTO: 5.6 K/UL (ref 4.8–10.8)

## 2025-08-19 PROCEDURE — 36415 COLL VENOUS BLD VENIPUNCTURE: CPT

## 2025-08-19 PROCEDURE — 80053 COMPREHEN METABOLIC PANEL: CPT

## 2025-08-19 PROCEDURE — 82306 VITAMIN D 25 HYDROXY: CPT

## 2025-08-19 PROCEDURE — 84153 ASSAY OF PSA TOTAL: CPT

## 2025-08-19 PROCEDURE — 85025 COMPLETE CBC W/AUTO DIFF WBC: CPT

## 2025-08-19 PROCEDURE — 80061 LIPID PANEL: CPT

## 2025-08-19 PROCEDURE — 84443 ASSAY THYROID STIM HORMONE: CPT

## 2025-08-19 PROCEDURE — 83036 HEMOGLOBIN GLYCOSYLATED A1C: CPT

## 2025-08-21 ENCOUNTER — OFFICE VISIT (OUTPATIENT)
Dept: MEDICAL GROUP | Facility: PHYSICIAN GROUP | Age: 60
End: 2025-08-21
Payer: COMMERCIAL

## 2025-08-21 ENCOUNTER — HOSPITAL ENCOUNTER (OUTPATIENT)
Facility: MEDICAL CENTER | Age: 60
End: 2025-08-21
Attending: PHYSICIAN ASSISTANT
Payer: COMMERCIAL

## 2025-08-21 VITALS
SYSTOLIC BLOOD PRESSURE: 146 MMHG | DIASTOLIC BLOOD PRESSURE: 76 MMHG | HEART RATE: 71 BPM | WEIGHT: 306.4 LBS | RESPIRATION RATE: 21 BRPM | BODY MASS INDEX: 46.44 KG/M2 | HEIGHT: 68 IN | OXYGEN SATURATION: 96 % | TEMPERATURE: 98.5 F

## 2025-08-21 DIAGNOSIS — E11.69 DYSLIPIDEMIA DUE TO TYPE 2 DIABETES MELLITUS (HCC): ICD-10-CM

## 2025-08-21 DIAGNOSIS — E66.3 OVERWEIGHT: Primary | ICD-10-CM

## 2025-08-21 DIAGNOSIS — E11.59 HYPERTENSION ASSOCIATED WITH TYPE 2 DIABETES MELLITUS (HCC): ICD-10-CM

## 2025-08-21 DIAGNOSIS — E78.5 DYSLIPIDEMIA DUE TO TYPE 2 DIABETES MELLITUS (HCC): ICD-10-CM

## 2025-08-21 DIAGNOSIS — E11.8 CONTROLLED TYPE 2 DIABETES MELLITUS WITH COMPLICATION, WITHOUT LONG-TERM CURRENT USE OF INSULIN (HCC): ICD-10-CM

## 2025-08-21 DIAGNOSIS — I15.2 HYPERTENSION ASSOCIATED WITH TYPE 2 DIABETES MELLITUS (HCC): ICD-10-CM

## 2025-08-21 PROCEDURE — 3077F SYST BP >= 140 MM HG: CPT | Performed by: PHYSICIAN ASSISTANT

## 2025-08-21 PROCEDURE — 99214 OFFICE O/P EST MOD 30 MIN: CPT | Performed by: PHYSICIAN ASSISTANT

## 2025-08-21 PROCEDURE — 82570 ASSAY OF URINE CREATININE: CPT

## 2025-08-21 PROCEDURE — 82043 UR ALBUMIN QUANTITATIVE: CPT

## 2025-08-21 PROCEDURE — 3078F DIAST BP <80 MM HG: CPT | Performed by: PHYSICIAN ASSISTANT

## 2025-08-21 ASSESSMENT — ENCOUNTER SYMPTOMS
CHILLS: 0
SHORTNESS OF BREATH: 0
FEVER: 0

## 2025-08-21 ASSESSMENT — FIBROSIS 4 INDEX: FIB4 SCORE: 1.32

## 2025-08-22 LAB
CREAT UR-MCNC: 180 MG/DL
MICROALBUMIN UR-MCNC: 2.2 MG/DL
MICROALBUMIN/CREAT UR: 12 MG/G (ref 0–30)

## 2025-08-25 ENCOUNTER — TELEPHONE (OUTPATIENT)
Dept: VASCULAR LAB | Facility: MEDICAL CENTER | Age: 60
End: 2025-08-25
Payer: COMMERCIAL

## (undated) DEVICE — NEPTUNE 4 PORT MANIFOLD - (20/PK)

## (undated) DEVICE — GLOVE, LITE (PAIR)

## (undated) DEVICE — TRAP POLYP E-TRAP (25EA/BX)